# Patient Record
Sex: FEMALE | Race: WHITE | NOT HISPANIC OR LATINO | Employment: OTHER | URBAN - METROPOLITAN AREA
[De-identification: names, ages, dates, MRNs, and addresses within clinical notes are randomized per-mention and may not be internally consistent; named-entity substitution may affect disease eponyms.]

---

## 2017-01-10 PROBLEM — M21.611 BUNION OF GREAT TOE OF RIGHT FOOT: Status: ACTIVE | Noted: 2017-01-10

## 2017-02-02 ENCOUNTER — APPOINTMENT (OUTPATIENT)
Dept: RADIOLOGY | Facility: MEDICAL CENTER | Age: 72
DRG: 176 | End: 2017-02-02
Attending: EMERGENCY MEDICINE
Payer: MEDICARE

## 2017-02-02 ENCOUNTER — HOSPITAL ENCOUNTER (INPATIENT)
Facility: MEDICAL CENTER | Age: 72
LOS: 2 days | DRG: 167 | End: 2017-02-04
Attending: INTERNAL MEDICINE | Admitting: INTERNAL MEDICINE
Payer: MEDICARE

## 2017-02-02 ENCOUNTER — RESOLUTE PROFESSIONAL BILLING HOSPITAL PROF FEE (OUTPATIENT)
Dept: HOSPITALIST | Facility: MEDICAL CENTER | Age: 72
End: 2017-02-02
Payer: MEDICARE

## 2017-02-02 ENCOUNTER — APPOINTMENT (OUTPATIENT)
Dept: RADIOLOGY | Facility: MEDICAL CENTER | Age: 72
DRG: 167 | End: 2017-02-02
Attending: INTERNAL MEDICINE
Payer: MEDICARE

## 2017-02-02 ENCOUNTER — HOSPITAL ENCOUNTER (INPATIENT)
Facility: MEDICAL CENTER | Age: 72
LOS: 1 days | DRG: 176 | End: 2017-02-02
Attending: EMERGENCY MEDICINE | Admitting: INTERNAL MEDICINE
Payer: MEDICARE

## 2017-02-02 VITALS
WEIGHT: 182 LBS | HEART RATE: 77 BPM | BODY MASS INDEX: 28.56 KG/M2 | TEMPERATURE: 98.3 F | RESPIRATION RATE: 16 BRPM | HEIGHT: 67 IN | OXYGEN SATURATION: 95 % | SYSTOLIC BLOOD PRESSURE: 135 MMHG | DIASTOLIC BLOOD PRESSURE: 63 MMHG

## 2017-02-02 DIAGNOSIS — I26.99 OTHER ACUTE PULMONARY EMBOLISM WITHOUT ACUTE COR PULMONALE (HCC): ICD-10-CM

## 2017-02-02 LAB
ALBUMIN SERPL BCP-MCNC: 4 G/DL (ref 3.2–4.9)
ALBUMIN/GLOB SERPL: 1.4 G/DL
ALP SERPL-CCNC: 131 U/L (ref 30–99)
ALT SERPL-CCNC: 17 U/L (ref 2–50)
ANION GAP SERPL CALC-SCNC: 8 MMOL/L (ref 0–11.9)
APPEARANCE UR: CLEAR
AST SERPL-CCNC: 14 U/L (ref 12–45)
BACTERIA #/AREA URNS HPF: ABNORMAL /HPF
BASOPHILS # BLD AUTO: 0.5 % (ref 0–1.8)
BASOPHILS # BLD: 0.08 K/UL (ref 0–0.12)
BILIRUB SERPL-MCNC: 0.5 MG/DL (ref 0.1–1.5)
BILIRUB UR QL STRIP.AUTO: NEGATIVE
BNP SERPL-MCNC: 18 PG/ML (ref 0–100)
BUN SERPL-MCNC: 22 MG/DL (ref 8–22)
CALCIUM SERPL-MCNC: 10.1 MG/DL (ref 8.4–10.2)
CHLORIDE SERPL-SCNC: 103 MMOL/L (ref 96–112)
CO2 SERPL-SCNC: 26 MMOL/L (ref 20–33)
COLOR UR: YELLOW
CREAT SERPL-MCNC: 1.01 MG/DL (ref 0.5–1.4)
CULTURE IF INDICATED INDCX: NO UA CULTURE
EKG IMPRESSION: NORMAL
EOSINOPHIL # BLD AUTO: 0.05 K/UL (ref 0–0.51)
EOSINOPHIL NFR BLD: 0.3 % (ref 0–6.9)
EPI CELLS #/AREA URNS HPF: ABNORMAL /HPF
ERYTHROCYTE [DISTWIDTH] IN BLOOD BY AUTOMATED COUNT: 41.8 FL (ref 35.9–50)
GFR SERPL CREATININE-BSD FRML MDRD: 54 ML/MIN/1.73 M 2
GLOBULIN SER CALC-MCNC: 2.9 G/DL (ref 1.9–3.5)
GLUCOSE SERPL-MCNC: 137 MG/DL (ref 65–99)
GLUCOSE UR STRIP.AUTO-MCNC: NEGATIVE MG/DL
HCT VFR BLD AUTO: 42.8 % (ref 37–47)
HGB BLD-MCNC: 14.3 G/DL (ref 12–16)
IMM GRANULOCYTES # BLD AUTO: 0.1 K/UL (ref 0–0.11)
IMM GRANULOCYTES NFR BLD AUTO: 0.6 % (ref 0–0.9)
INR BLD: 1.3
KETONES UR STRIP.AUTO-MCNC: NEGATIVE MG/DL
LEUKOCYTE ESTERASE UR QL STRIP.AUTO: NEGATIVE
LIPASE SERPL-CCNC: 27 U/L (ref 7–58)
LV EJECT FRACT  99904: 70
LV EJECT FRACT MOD 2C 99903: 75.57
LV EJECT FRACT MOD 4C 99902: 68.34
LV EJECT FRACT MOD BP 99901: 69.78
LYMPHOCYTES # BLD AUTO: 1.13 K/UL (ref 1–4.8)
LYMPHOCYTES NFR BLD: 6.4 % (ref 22–41)
MCH RBC QN AUTO: 30.4 PG (ref 27–33)
MCHC RBC AUTO-ENTMCNC: 33.4 G/DL (ref 33.6–35)
MCV RBC AUTO: 91.1 FL (ref 81.4–97.8)
MICRO URNS: ABNORMAL
MONOCYTES # BLD AUTO: 1.08 K/UL (ref 0–0.85)
MONOCYTES NFR BLD AUTO: 6.2 % (ref 0–13.4)
MUCOUS THREADS #/AREA URNS HPF: ABNORMAL /HPF
NEUTROPHILS # BLD AUTO: 15.08 K/UL (ref 2–7.15)
NEUTROPHILS NFR BLD: 86 % (ref 44–72)
NITRITE UR QL STRIP.AUTO: NEGATIVE
NRBC # BLD AUTO: 0 K/UL
NRBC BLD AUTO-RTO: 0 /100 WBC
PH UR STRIP.AUTO: 5.5 [PH]
PLATELET # BLD AUTO: 228 K/UL (ref 164–446)
PMV BLD AUTO: 10.4 FL (ref 9–12.9)
POTASSIUM SERPL-SCNC: 4.4 MMOL/L (ref 3.6–5.5)
PROT SERPL-MCNC: 6.9 G/DL (ref 6–8.2)
PROT UR QL STRIP: NEGATIVE MG/DL
RBC # BLD AUTO: 4.7 M/UL (ref 4.2–5.4)
RBC # URNS HPF: ABNORMAL /HPF
RBC UR QL AUTO: ABNORMAL
SODIUM SERPL-SCNC: 137 MMOL/L (ref 135–145)
SP GR UR STRIP.AUTO: 1.02
TROPONIN I SERPL-MCNC: <0.02 NG/ML (ref 0–0.04)
WBC # BLD AUTO: 17.5 K/UL (ref 4.8–10.8)
WBC #/AREA URNS HPF: ABNORMAL /HPF

## 2017-02-02 PROCEDURE — 770022 HCHG ROOM/CARE - ICU (200)

## 2017-02-02 PROCEDURE — 700105 HCHG RX REV CODE 258: Performed by: EMERGENCY MEDICINE

## 2017-02-02 PROCEDURE — 304562 HCHG STAT O2 MASK/CANNULA

## 2017-02-02 PROCEDURE — 700111 HCHG RX REV CODE 636 W/ 250 OVERRIDE (IP): Performed by: EMERGENCY MEDICINE

## 2017-02-02 PROCEDURE — 94760 N-INVAS EAR/PLS OXIMETRY 1: CPT

## 2017-02-02 PROCEDURE — 700111 HCHG RX REV CODE 636 W/ 250 OVERRIDE (IP)

## 2017-02-02 PROCEDURE — 93970 EXTREMITY STUDY: CPT

## 2017-02-02 PROCEDURE — 93306 TTE W/DOPPLER COMPLETE: CPT | Mod: 26 | Performed by: INTERNAL MEDICINE

## 2017-02-02 PROCEDURE — 96372 THER/PROPH/DIAG INJ SC/IM: CPT

## 2017-02-02 PROCEDURE — 85025 COMPLETE CBC W/AUTO DIFF WBC: CPT

## 2017-02-02 PROCEDURE — 99285 EMERGENCY DEPT VISIT HI MDM: CPT

## 2017-02-02 PROCEDURE — 770020 HCHG ROOM/CARE - TELE (206)

## 2017-02-02 PROCEDURE — 84484 ASSAY OF TROPONIN QUANT: CPT

## 2017-02-02 PROCEDURE — 83880 ASSAY OF NATRIURETIC PEPTIDE: CPT

## 2017-02-02 PROCEDURE — 700102 HCHG RX REV CODE 250 W/ 637 OVERRIDE(OP): Performed by: INTERNAL MEDICINE

## 2017-02-02 PROCEDURE — 96374 THER/PROPH/DIAG INJ IV PUSH: CPT

## 2017-02-02 PROCEDURE — 700117 HCHG RX CONTRAST REV CODE 255: Performed by: RADIOLOGY

## 2017-02-02 PROCEDURE — 85610 PROTHROMBIN TIME: CPT

## 2017-02-02 PROCEDURE — 99223 1ST HOSP IP/OBS HIGH 75: CPT | Performed by: INTERNAL MEDICINE

## 2017-02-02 PROCEDURE — A9270 NON-COVERED ITEM OR SERVICE: HCPCS | Performed by: INTERNAL MEDICINE

## 2017-02-02 PROCEDURE — 06H03DZ INSERTION OF INTRALUMINAL DEVICE INTO INFERIOR VENA CAVA, PERCUTANEOUS APPROACH: ICD-10-PCS | Performed by: RADIOLOGY

## 2017-02-02 PROCEDURE — 96375 TX/PRO/DX INJ NEW DRUG ADDON: CPT

## 2017-02-02 PROCEDURE — 74177 CT ABD & PELVIS W/CONTRAST: CPT

## 2017-02-02 PROCEDURE — 81001 URINALYSIS AUTO W/SCOPE: CPT

## 2017-02-02 PROCEDURE — 700117 HCHG RX CONTRAST REV CODE 255: Performed by: EMERGENCY MEDICINE

## 2017-02-02 PROCEDURE — 83690 ASSAY OF LIPASE: CPT

## 2017-02-02 PROCEDURE — 700111 HCHG RX REV CODE 636 W/ 250 OVERRIDE (IP): Performed by: INTERNAL MEDICINE

## 2017-02-02 PROCEDURE — 71275 CT ANGIOGRAPHY CHEST: CPT

## 2017-02-02 PROCEDURE — 93005 ELECTROCARDIOGRAM TRACING: CPT | Performed by: EMERGENCY MEDICINE

## 2017-02-02 PROCEDURE — 37191 INS ENDOVAS VENA CAVA FILTR: CPT

## 2017-02-02 PROCEDURE — 99152 MOD SED SAME PHYS/QHP 5/>YRS: CPT

## 2017-02-02 PROCEDURE — 93306 TTE W/DOPPLER COMPLETE: CPT

## 2017-02-02 PROCEDURE — 93005 ELECTROCARDIOGRAM TRACING: CPT

## 2017-02-02 PROCEDURE — 80053 COMPREHEN METABOLIC PANEL: CPT

## 2017-02-02 RX ORDER — DOCUSATE SODIUM 100 MG/1
100 CAPSULE, LIQUID FILLED ORAL EVERY MORNING
Status: DISCONTINUED | OUTPATIENT
Start: 2017-02-03 | End: 2017-02-04 | Stop reason: HOSPADM

## 2017-02-02 RX ORDER — ACETAMINOPHEN 500 MG
1000 TABLET ORAL EVERY 6 HOURS PRN
COMMUNITY
End: 2017-04-26

## 2017-02-02 RX ORDER — LACTULOSE 20 G/30ML
30 SOLUTION ORAL
Status: DISCONTINUED | OUTPATIENT
Start: 2017-02-02 | End: 2017-02-02 | Stop reason: HOSPADM

## 2017-02-02 RX ORDER — ENEMA 19; 7 G/133ML; G/133ML
1 ENEMA RECTAL
Status: DISCONTINUED | OUTPATIENT
Start: 2017-02-02 | End: 2017-02-02 | Stop reason: HOSPADM

## 2017-02-02 RX ORDER — DOCUSATE SODIUM 100 MG/1
100 CAPSULE, LIQUID FILLED ORAL EVERY MORNING
Status: DISCONTINUED | OUTPATIENT
Start: 2017-02-02 | End: 2017-02-02 | Stop reason: HOSPADM

## 2017-02-02 RX ORDER — LISINOPRIL 5 MG/1
10 TABLET ORAL
Status: DISCONTINUED | OUTPATIENT
Start: 2017-02-02 | End: 2017-02-02 | Stop reason: HOSPADM

## 2017-02-02 RX ORDER — AMOXICILLIN 250 MG
1 CAPSULE ORAL
Status: CANCELLED | OUTPATIENT
Start: 2017-02-02

## 2017-02-02 RX ORDER — ACETAMINOPHEN 500 MG
1000 TABLET ORAL EVERY 6 HOURS PRN
Status: DISCONTINUED | OUTPATIENT
Start: 2017-02-02 | End: 2017-02-04 | Stop reason: HOSPADM

## 2017-02-02 RX ORDER — ONDANSETRON 2 MG/ML
4 INJECTION INTRAMUSCULAR; INTRAVENOUS ONCE
Status: COMPLETED | OUTPATIENT
Start: 2017-02-02 | End: 2017-02-02

## 2017-02-02 RX ORDER — ONDANSETRON 4 MG/1
4 TABLET, ORALLY DISINTEGRATING ORAL EVERY 4 HOURS PRN
Status: DISCONTINUED | OUTPATIENT
Start: 2017-02-02 | End: 2017-02-04 | Stop reason: HOSPADM

## 2017-02-02 RX ORDER — ENEMA 19; 7 G/133ML; G/133ML
1 ENEMA RECTAL
Status: DISCONTINUED | OUTPATIENT
Start: 2017-02-02 | End: 2017-02-04 | Stop reason: HOSPADM

## 2017-02-02 RX ORDER — KETOROLAC TROMETHAMINE 30 MG/ML
30 INJECTION, SOLUTION INTRAMUSCULAR; INTRAVENOUS EVERY 6 HOURS PRN
Status: DISCONTINUED | OUTPATIENT
Start: 2017-02-02 | End: 2017-02-04 | Stop reason: HOSPADM

## 2017-02-02 RX ORDER — ONDANSETRON 2 MG/ML
4 INJECTION INTRAMUSCULAR; INTRAVENOUS EVERY 4 HOURS PRN
Status: CANCELLED | OUTPATIENT
Start: 2017-02-02

## 2017-02-02 RX ORDER — AMOXICILLIN 250 MG
1 CAPSULE ORAL NIGHTLY
Status: DISCONTINUED | OUTPATIENT
Start: 2017-02-02 | End: 2017-02-02 | Stop reason: HOSPADM

## 2017-02-02 RX ORDER — LISINOPRIL 10 MG/1
10 TABLET ORAL
Status: DISCONTINUED | OUTPATIENT
Start: 2017-02-02 | End: 2017-02-04 | Stop reason: HOSPADM

## 2017-02-02 RX ORDER — ONDANSETRON 2 MG/ML
4 INJECTION INTRAMUSCULAR; INTRAVENOUS PRN
Status: ACTIVE | OUTPATIENT
Start: 2017-02-02 | End: 2017-02-02

## 2017-02-02 RX ORDER — AMOXICILLIN 250 MG
1 CAPSULE ORAL
Status: DISCONTINUED | OUTPATIENT
Start: 2017-02-02 | End: 2017-02-04 | Stop reason: HOSPADM

## 2017-02-02 RX ORDER — KETOROLAC TROMETHAMINE 30 MG/ML
30 INJECTION, SOLUTION INTRAMUSCULAR; INTRAVENOUS EVERY 6 HOURS PRN
Status: CANCELLED | OUTPATIENT
Start: 2017-02-02 | End: 2017-02-07

## 2017-02-02 RX ORDER — ENEMA 19; 7 G/133ML; G/133ML
1 ENEMA RECTAL
Status: CANCELLED | OUTPATIENT
Start: 2017-02-02

## 2017-02-02 RX ORDER — BISACODYL 10 MG
10 SUPPOSITORY, RECTAL RECTAL
Status: DISCONTINUED | OUTPATIENT
Start: 2017-02-02 | End: 2017-02-02 | Stop reason: HOSPADM

## 2017-02-02 RX ORDER — SODIUM CHLORIDE 9 MG/ML
500 INJECTION, SOLUTION INTRAVENOUS PRN
Status: DISCONTINUED | OUTPATIENT
Start: 2017-02-02 | End: 2017-02-04 | Stop reason: HOSPADM

## 2017-02-02 RX ORDER — LISINOPRIL 5 MG/1
10 TABLET ORAL
Status: CANCELLED | OUTPATIENT
Start: 2017-02-02

## 2017-02-02 RX ORDER — BISACODYL 10 MG
10 SUPPOSITORY, RECTAL RECTAL
Status: CANCELLED | OUTPATIENT
Start: 2017-02-02

## 2017-02-02 RX ORDER — ONDANSETRON 2 MG/ML
4 INJECTION INTRAMUSCULAR; INTRAVENOUS EVERY 4 HOURS PRN
Status: DISCONTINUED | OUTPATIENT
Start: 2017-02-02 | End: 2017-02-02 | Stop reason: HOSPADM

## 2017-02-02 RX ORDER — SODIUM CHLORIDE 9 MG/ML
1000 INJECTION, SOLUTION INTRAVENOUS ONCE
Status: COMPLETED | OUTPATIENT
Start: 2017-02-02 | End: 2017-02-02

## 2017-02-02 RX ORDER — CEPHALEXIN 500 MG/1
500 CAPSULE ORAL 4 TIMES DAILY
Status: ON HOLD | COMMUNITY
Start: 2017-01-20 | End: 2017-02-04

## 2017-02-02 RX ORDER — MIDAZOLAM HYDROCHLORIDE 1 MG/ML
INJECTION INTRAMUSCULAR; INTRAVENOUS
Status: COMPLETED
Start: 2017-02-02 | End: 2017-02-02

## 2017-02-02 RX ORDER — MIDAZOLAM HYDROCHLORIDE 1 MG/ML
.5-2 INJECTION INTRAMUSCULAR; INTRAVENOUS PRN
Status: ACTIVE | OUTPATIENT
Start: 2017-02-02 | End: 2017-02-02

## 2017-02-02 RX ORDER — AMOXICILLIN 250 MG
1 CAPSULE ORAL NIGHTLY
Status: CANCELLED | OUTPATIENT
Start: 2017-02-02

## 2017-02-02 RX ORDER — AMOXICILLIN 250 MG
1 CAPSULE ORAL
Status: DISCONTINUED | OUTPATIENT
Start: 2017-02-02 | End: 2017-02-02 | Stop reason: HOSPADM

## 2017-02-02 RX ORDER — DOCUSATE SODIUM 100 MG/1
100 CAPSULE, LIQUID FILLED ORAL EVERY MORNING
Status: CANCELLED | OUTPATIENT
Start: 2017-02-03

## 2017-02-02 RX ORDER — LACTULOSE 20 G/30ML
30 SOLUTION ORAL
Status: CANCELLED | OUTPATIENT
Start: 2017-02-02

## 2017-02-02 RX ORDER — ONDANSETRON 4 MG/1
4 TABLET, ORALLY DISINTEGRATING ORAL EVERY 4 HOURS PRN
Status: DISCONTINUED | OUTPATIENT
Start: 2017-02-02 | End: 2017-02-02 | Stop reason: HOSPADM

## 2017-02-02 RX ORDER — KETOROLAC TROMETHAMINE 30 MG/ML
30 INJECTION, SOLUTION INTRAMUSCULAR; INTRAVENOUS EVERY 6 HOURS PRN
Status: DISCONTINUED | OUTPATIENT
Start: 2017-02-02 | End: 2017-02-02 | Stop reason: HOSPADM

## 2017-02-02 RX ORDER — LISINOPRIL 5 MG/1
10 TABLET ORAL DAILY
Status: DISCONTINUED | OUTPATIENT
Start: 2017-02-02 | End: 2017-02-02

## 2017-02-02 RX ORDER — LACTULOSE 20 G/30ML
30 SOLUTION ORAL
Status: DISCONTINUED | OUTPATIENT
Start: 2017-02-02 | End: 2017-02-04 | Stop reason: HOSPADM

## 2017-02-02 RX ORDER — ONDANSETRON 2 MG/ML
4 INJECTION INTRAMUSCULAR; INTRAVENOUS EVERY 4 HOURS PRN
Status: DISCONTINUED | OUTPATIENT
Start: 2017-02-02 | End: 2017-02-04 | Stop reason: HOSPADM

## 2017-02-02 RX ORDER — BISACODYL 10 MG
10 SUPPOSITORY, RECTAL RECTAL
Status: DISCONTINUED | OUTPATIENT
Start: 2017-02-02 | End: 2017-02-04 | Stop reason: HOSPADM

## 2017-02-02 RX ORDER — ACETAMINOPHEN 500 MG
1000 TABLET ORAL EVERY 6 HOURS PRN
Status: CANCELLED | OUTPATIENT
Start: 2017-02-02

## 2017-02-02 RX ORDER — ACETAMINOPHEN 500 MG
1000 TABLET ORAL EVERY 6 HOURS PRN
Status: DISCONTINUED | OUTPATIENT
Start: 2017-02-02 | End: 2017-02-02 | Stop reason: HOSPADM

## 2017-02-02 RX ORDER — AMOXICILLIN 250 MG
1 CAPSULE ORAL NIGHTLY
Status: DISCONTINUED | OUTPATIENT
Start: 2017-02-02 | End: 2017-02-04 | Stop reason: HOSPADM

## 2017-02-02 RX ORDER — ONDANSETRON 4 MG/1
4 TABLET, ORALLY DISINTEGRATING ORAL EVERY 4 HOURS PRN
Status: CANCELLED | OUTPATIENT
Start: 2017-02-02

## 2017-02-02 RX ADMIN — KETOROLAC TROMETHAMINE 30 MG: 30 INJECTION, SOLUTION INTRAMUSCULAR; INTRAVENOUS at 20:02

## 2017-02-02 RX ADMIN — SODIUM CHLORIDE 1000 ML: 9 INJECTION, SOLUTION INTRAVENOUS at 06:28

## 2017-02-02 RX ADMIN — ENOXAPARIN SODIUM 80 MG: 100 INJECTION SUBCUTANEOUS at 08:41

## 2017-02-02 RX ADMIN — IOHEXOL 100 ML: 350 INJECTION, SOLUTION INTRAVENOUS at 07:58

## 2017-02-02 RX ADMIN — ONDANSETRON 4 MG: 2 INJECTION, SOLUTION INTRAMUSCULAR; INTRAVENOUS at 06:28

## 2017-02-02 RX ADMIN — HYDROMORPHONE HYDROCHLORIDE 1 MG: 1 INJECTION, SOLUTION INTRAMUSCULAR; INTRAVENOUS; SUBCUTANEOUS at 06:28

## 2017-02-02 RX ADMIN — IOHEXOL 38 ML: 300 INJECTION, SOLUTION INTRAVENOUS at 16:10

## 2017-02-02 RX ADMIN — MIDAZOLAM HYDROCHLORIDE 2 MG: 1 INJECTION INTRAMUSCULAR; INTRAVENOUS at 15:54

## 2017-02-02 RX ADMIN — ENOXAPARIN SODIUM 80 MG: 100 INJECTION SUBCUTANEOUS at 20:02

## 2017-02-02 RX ADMIN — KETOROLAC TROMETHAMINE 30 MG: 30 INJECTION, SOLUTION INTRAMUSCULAR; INTRAVENOUS at 10:16

## 2017-02-02 RX ADMIN — CHOLECALCIFEROL TAB 25 MCG (1000 UNIT) 1000 UNITS: 25 TAB at 20:02

## 2017-02-02 RX ADMIN — MIDAZOLAM 2 MG: 1 INJECTION INTRAMUSCULAR; INTRAVENOUS at 15:54

## 2017-02-02 RX ADMIN — LISINOPRIL 10 MG: 10 TABLET ORAL at 20:02

## 2017-02-02 ASSESSMENT — PAIN SCALES - GENERAL
PAINLEVEL_OUTOF10: 2
PAINLEVEL_OUTOF10: 4
PAINLEVEL_OUTOF10: 2
PAINLEVEL_OUTOF10: 1
PAINLEVEL_OUTOF10: 0
PAINLEVEL_OUTOF10: 2
PAINLEVEL_OUTOF10: 0
PAINLEVEL_OUTOF10: 2
PAINLEVEL_OUTOF10: 0

## 2017-02-02 ASSESSMENT — LIFESTYLE VARIABLES
TOTAL SCORE: 0
EVER HAD A DRINK FIRST THING IN THE MORNING TO STEADY YOUR NERVES TO GET RID OF A HANGOVER: NO
EVER_SMOKED: NEVER
AVERAGE NUMBER OF DAYS PER WEEK YOU HAVE A DRINK CONTAINING ALCOHOL: 7
TOTAL SCORE: 0
EVER HAD A DRINK FIRST THING IN THE MORNING TO STEADY YOUR NERVES TO GET RID OF A HANGOVER: NO
CONSUMPTION TOTAL: POSITIVE
ON A TYPICAL DAY WHEN YOU DRINK ALCOHOL HOW MANY DRINKS DO YOU HAVE: 2
TOTAL SCORE: 0
EVER FELT BAD OR GUILTY ABOUT YOUR DRINKING: NO
ALCOHOL_USE: YES
EVER_SMOKED: NEVER
TOTAL SCORE: 0
HAVE PEOPLE ANNOYED YOU BY CRITICIZING YOUR DRINKING: NO
HAVE PEOPLE ANNOYED YOU BY CRITICIZING YOUR DRINKING: NO
EVER_SMOKED: NEVER
DO YOU DRINK ALCOHOL: YES
HOW MANY TIMES IN THE PAST YEAR HAVE YOU HAD 5 OR MORE DRINKS IN A DAY: 0
HAVE YOU EVER FELT YOU SHOULD CUT DOWN ON YOUR DRINKING: NO
AVERAGE NUMBER OF DAYS PER WEEK YOU HAVE A DRINK CONTAINING ALCOHOL: 7
EVER FELT BAD OR GUILTY ABOUT YOUR DRINKING: NO
HOW MANY TIMES IN THE PAST YEAR HAVE YOU HAD 5 OR MORE DRINKS IN A DAY: 0
ON A TYPICAL DAY WHEN YOU DRINK ALCOHOL HOW MANY DRINKS DO YOU HAVE: 2
TOTAL SCORE: 0
TOTAL SCORE: 0
HAVE YOU EVER FELT YOU SHOULD CUT DOWN ON YOUR DRINKING: NO
EVER_SMOKED: NEVER
CONSUMPTION TOTAL: POSITIVE

## 2017-02-02 ASSESSMENT — COPD QUESTIONNAIRES
HAVE YOU SMOKED AT LEAST 100 CIGARETTES IN YOUR ENTIRE LIFE: NO/DON'T KNOW
DURING THE PAST 4 WEEKS HOW MUCH DID YOU FEEL SHORT OF BREATH: NONE/LITTLE OF THE TIME
COPD SCREENING SCORE: 2
DO YOU EVER COUGH UP ANY MUCUS OR PHLEGM?: NO/ONLY WITH OCCASIONAL COLDS OR INFECTIONS

## 2017-02-02 ASSESSMENT — PATIENT HEALTH QUESTIONNAIRE - PHQ9
2. FEELING DOWN, DEPRESSED, IRRITABLE, OR HOPELESS: NOT AT ALL
1. LITTLE INTEREST OR PLEASURE IN DOING THINGS: NOT AT ALL
SUM OF ALL RESPONSES TO PHQ QUESTIONS 1-9: 0
SUM OF ALL RESPONSES TO PHQ9 QUESTIONS 1 AND 2: 0

## 2017-02-02 NOTE — PROGRESS NOTES
Pt underwent an IVC filter placement performed by Dr Chamberlain. Pt remained hemodynamically stable throughout the procedure. No adverse reaction noted. Bandage to right groin access site cdi. Report to ZAHIDA Gomez. Pt taken back to room in stable condition.     Cook Celect Platinum Vena Cava Filter - ref # [id number], lot # [id number]

## 2017-02-02 NOTE — ED PROVIDER NOTES
"ED Provider Note    CHIEF COMPLAINT  Chief Complaint   Patient presents with   • Shortness of Breath     Started at 2200 last night, \"couldnt open rib cage because of the pain\"   • Muscle Spasms     Right side mid to low back, 9/10 pain at home, bunion surgery Oscar 10       HPI  Katy Perez is a 71 y.o. female who presents to the ED with back spasms. The patient states that yesterday afternoon she started noticing some pain around her right CVA area right lower back area, and progressively got worse, 10:00 last night she is having spasms where she can't take a breath. Pain is worse when she takes a big deep breath, does not change with movement, worse with lying down. The patient had some nausea but no vomiting, does radiate around some to the right upper side. She still has her gallbladder and her appendix. No aung abdominal pain, no hematuria, dysuria. No chest pains, cough, fevers. No history of DVT, PE, is not on hormones, did have surgery or proximal 0.3 weeks ago on her foot. No leg pain or swelling.    REVIEW OF SYSTEMS  See HPI for further details. All other systems are negative.     PAST MEDICAL HISTORY  Past Medical History   Diagnosis Date   • Hypertension    • MEDICAL HOME 11/8/12   • Arthritis 5/16/2012     bilateral knees   • Pain      chronic foot pain       FAMILY HISTORY  Family History   Problem Relation Age of Onset   • Hypertension Father    • Heart Disease Father 89   • Cancer Maternal Grandmother    • Heart Disease Mother 84     CHF       SOCIAL HISTORY  Social History     Social History   • Marital Status:      Spouse Name: N/A   • Number of Children: N/A   • Years of Education: N/A     Social History Main Topics   • Smoking status: Never Smoker    • Smokeless tobacco: Never Used   • Alcohol Use: 4.2 oz/week     7 Glasses of wine per week      Comment: \"a couple glasses of wine a day\"   • Drug Use: No   • Sexual Activity: No     Other Topics Concern   • None     Social " "History Narrative       SURGICAL HISTORY  Past Surgical History   Procedure Laterality Date   • Lumpectomy     • Pr enlarge breast with implant     • Gyn surgery  1977     tubal   • Knee arthroplasty total  11/5/2013     Performed by Luis Faulkner M.D. at SURGERY [facility]   • Bunionectomy drew Right 1/10/2017     Procedure: BUNIONECTOMY DREW 2ND;  Surgeon: Ten Burton D.P.M.;  Location: SURGERY SURGICAL [facility] ORS;  Service:    • Hammertoe correction Right 1/10/2017     Procedure: HAMMERTOE CORRECTION;  Surgeon: Ten Burton D.P.M.;  Location: SURGERY SURGICAL [facility] ORS;  Service:        CURRENT MEDICATIONS  Home Medications     Reviewed by Henny Morillo R.N. (Registered Nurse) on 02/02/17 at [facility]  Med List Status: Complete    Medication Last Dose Status    acetaminophen (TYLENOL) 500 MG Tab 2/1/2017 Active    cephALEXin (KEFLEX) 500 MG Cap completed Active    Cholecalciferol (VITAMIN D3) 5000 UNITS CAPS 2/1/2017 Active    lisinopril (PRINIVIL) 10 MG Tab 2/1/2017 Active                ALLERGIES  Allergies   Allergen Reactions   • Pcn [Penicillins] Rash       PHYSICAL EXAM  VITAL SIGNS: /63 mmHg  Pulse 77  Temp(Src) 36.8 °C (98.3 °F)  Resp 16  Ht 1.702 m (5' 7\")  Wt 82.555 kg (182 lb)  BMI 28.50 kg/m2  SpO2 95%  Breastfeeding? No  Constitutional: Well developed, Well nourished, moderate distress, Non-toxic appearance.   HENT:  Atraumatic, Normocephalic, Oral pharynx with moist mucous membranes.   Eyes:  EOMI, PERRL, no scleral icterus.  Neck: Normal range of motion, No tenderness, Supple, No stridor.   Cardiovascular: Normal heart rate, Normal rhythm,   Thorax & Lungs: Normal breath sounds, No respiratory distress, No wheezing, No chest tenderness.   Abdomen: Bowel sounds normal, Soft, No tenderness, No masses, No pulsatile masses. No right upper quadrant tenderness to palpation   Skin: Warm, Dry, No erythema, No rash.   Back: No rash, nontender, questionable slight right " CVA tenderness, no midline T or L-spine tenderness  Extremities: Intact distal pulses, No edema, No tenderness.   Neurologic: Alert & oriented x 3, Normal motor function, Normal sensory function, No focal deficits noted. Gait wide based but no ataxia    RADIOLOGY/PROCEDURES  ECHOCARDIOGRAM COMP W/O CONT   Final Result      LE VENOUS DUPLEX - DVT ([facility] Only)   Final Result      CT-ABDOMEN-PELVIS WITH   Final Result      No CT evidence of acute inflammatory abdominopelvic abnormality      Mild colonic diverticulosis without evidence of diverticulitis      Multiple indeterminate hepatic masses most likely are hemangiomas although less benign processes are not excluded. Multiphase enhanced MRI or CT may help better characterize      Left renal parapelvic cysts      CT-CTA CHEST PULMONARY ARTERY W/ RECONS   Final Result      Bilateral lobar and segmental pulmonary thromboemboli with right lower lobe segmental infarction      No ancillary findings to support right heart strain      Indeterminate right hepatic mass differential includes hemangioma, less benign solid mass or dense cyst. Multiphase enhanced CT or MRI could help clarify      Small right effusion      Findings were discussed with SHOLA VILLAGOMEZ on 2/2/2017 8:10 AM.          EKG Interpretation #1    Interpreted by emergency department physician    Rhythm: normal sinus   Rate: normal  Axis: normal  Ectopy: none  Conduction: normal  ST Segments: normal  T Waves: normal  Q Waves: none    Clinical Impression: no acute changes        EKG Interpretation #2    Interpreted by emergency department physician    Rhythm: normal sinus   Rate: normal  Axis: normal  Ectopy: none  Conduction: normal  ST Segments: normal  T Waves: flattening in  inferior leads  Q Waves: none    Clinical Impression: non-specific EKG        COURSE & MEDICAL DECISION MAKING  Pertinent Labs & Imaging studies reviewed. (See chart for details)  Patient is coming in with  right-sided flank pain differential diagnosis is large colon PE, constipation, gallbladder, kidney stones, muscle spasm. Due to this I will get a CT scan of the chest with IV contrast to rule out PE and followed through the abdomen to determine if there is any intra-abdominal pathology. We'll give the patient pain medicines, IV fluids    Patient got hypoxic after Dilaudid, CT scans confirmed bilateral pulmonary emboli, given the patient a Lovenox shot, CT scan of abdomen and pelvis was negative, discussed the case with Dr. Reid for admission to hospital.    FINAL IMPRESSION  1. Other acute pulmonary embolism without acute cor pulmonale (CMS-HCC)    . 2. Critical care time 35 minutes        This dictation was created using voice recognition software. The accuracy of the dictation is limited to the abilities of the software. I expect there may be some errors of grammar and possibly content. The nursing notes were reviewed and certain aspects of this information were incorporated into this note.    Electronically signed by: Juan F Donis, 2/2/2017 6:32 AM

## 2017-02-02 NOTE — IP AVS SNAPSHOT
" <p align=\"LEFT\"><IMG SRC=\"//[email or web address]/blob$/[email or web address]\" alt=\"Image\" WIDTH=\"50%\" HEIGHT=\"200\" BORDER=\"\"></p>                   Name:Katy Perez  Medical Record Number:[id number]  CSN: [id number]    YOB: 1945   Age: 71 y.o.  Sex: female  HT:1.702 m (5' 7.01\") WT: 83.2 kg (183 lb 6.8 oz)          Admit Date: 2/2/2017     Discharge Date:   Today's Date: 2/4/2017  Attending Doctor:  Harmony Reid M.D.                  Allergies:  Pcn          Follow-up Information     1. Call [facility].    Why:  [phone] for scheduling IVC filter removal in next few months.        2. Follow up with Nicholas Darnell M.D.. Schedule an appointment as soon as possible for a visit in 1 month.    Specialty:  Internal Medicine    Contact information    [address] [id number] [phone]           Medication List      Take these Medications        Instructions    acetaminophen 500 MG Tabs   Commonly known as:  TYLENOL    Take 1,000 mg by mouth every 6 hours as needed for Moderate Pain.   Dose:  1000 mg       lisinopril 10 MG Tabs   Commonly known as:  PRINIVIL    Take 1 Tab by mouth every day. TAKE ONE TABLET BY MOUTH DAILY   Dose:  10 mg       * rivaroxaban 15 MG Tabs tablet   Commonly known as:  XARELTO    Take 1 Tab by mouth 2 times a day.   Dose:  15 mg       * rivaroxaban 20 MG Tabs tablet   Commonly known as:  XARELTO    Doctor's comments:  To be taken after the 21st day of using Xarelto 15mg BID.   Take 1 Tab by mouth with dinner.   Dose:  20 mg       vitamin D3 5000 UNITS Caps    Take 1 Cap by mouth every day.   Dose:  1 Cap       * Notice:  This list has 2 medication(s) that are the same as other medications prescribed for you. Read the directions carefully, and ask your doctor or other care provider to review them with you.      "

## 2017-02-02 NOTE — ED NOTES
Assumed care of pt using AIDET.  Pt found to be 77% on RA, repositioned on gurney, placed on oxygen.  Pt states it is difficult to take a deep breath.    Pt states it feels better with the oxygen on.

## 2017-02-02 NOTE — DISCHARGE PLANNING
Medical Social Work    Referral: Pt discussed at IDT rounds this AM.    Intervention: New admit, pt will need an Echo.  No SS needs identified nor any requests for MARLY Goodwin during rounds.      Plan: SW available for any assistance with d.c planning.

## 2017-02-02 NOTE — DISCHARGE PLANNING
MARLY Goodwin received a call from transfer center with bed assignment - [facility] [address] for report [id number].  MARLY gave CO[facility]RA to [facility] who will be bringing it to bs RN Clara.  MARLY called [facility] requesting a [facility] transport as soon as possible

## 2017-02-02 NOTE — IP AVS SNAPSHOT
[employer] Access Code: Activation code not generated  Current [employer] Status: Active    [email or web address]  A secure, online tool to manage your health information     [facility]’s [employer]® is a secure, online tool that connects you to your personalized health information from the privacy of your home -- day or night - making it very easy for you to manage your healthcare. Once the activation process is completed, you can even access your medical information using the [employer] jose cruz, which is available for free in the [employer] store or Google Play store.     [employer] provides the following levels of access (as shown below):   My Chart Features   [facility] Primary Care Doctor [facility]  Specialists [facility]  Urgent  Care Non-[facility]  Primary Care  Doctor   Email your healthcare team securely and privately 24/7 X X X X   Manage appointments: schedule your next appointment; view details of past/upcoming appointments X      Request prescription refills. X      View recent personal medical records, including lab and immunizations X X X X   View health record, including health history, allergies, medications X X X X   Read reports about your outpatient visits, procedures, consult and ER notes X X X X   See your discharge summary, which is a recap of your hospital and/or ER visit that includes your diagnosis, lab results, and care plan. X X       How to register for [employer]:  1. Go to  [email or web address].  2. Click on the Sign Up Now box, which takes you to the New Member Sign Up page. You will need to provide the following information:  a. Enter your [employer] Access Code exactly as it appears at the top of this page. (You will not need to use this code after you’ve completed the sign-up process. If you do not sign up before the expiration date, you must request a new code.)   b. Enter your date of birth.   c. Enter your home email address.   d. Click Submit, and follow the next screen’s instructions.  3. Create a [employer] ID. This will  be your [facility] login ID and cannot be changed, so think of one that is secure and easy to remember.  4. Create a [facility] password. You can change your password at any time.  5. Enter your Password Reset Question and Answer. This can be used at a later time if you forget your password.   6. Enter your e-mail address. This allows you to receive e-mail notifications when new information is available in [facility].  7. Click Sign Up. You can now view your health information.    For assistance activating your [facility] account, call [phone]

## 2017-02-02 NOTE — PROGRESS NOTES
Direct admit from [facility]. Dr. Reid accepting patient for Pulmonary embolism. ADT signed and held by Dr. Reid, need to be released upon patient arrival to unit. Patient arriving via ground transport.

## 2017-02-02 NOTE — IP AVS SNAPSHOT
" After Visit Summary                                                                                                                  Name:Katy Perez  Medical Record Number:[id number]  CSN: [id number]    YOB: 1945   Age: 71 y.o.  Sex: female  HT:1.702 m (5' 7.01\") WT: 83.2 kg (183 lb 6.8 oz)          Admit Date: 2/2/2017     Discharge Date:   Today's Date: 2/4/2017  Attending Doctor:  Harmony Reid M.D.                  Allergies:  Pcn            Discharge Instructions       Foot Fracture  Your caregiver has diagnosed you as having a foot fracture (broken bone). Your foot has many bones. You have a fracture, or break, in one of these bones. In some cases, your doctor may put on a splint or removable fracture boot until the swelling in your foot has lessened. A cast may or may not be required.  HOME CARE INSTRUCTIONS   If you do not have a cast or splint:  You may bear weight on your injured foot as tolerated or advised.   Do not put any weight on your injured foot for as long as directed by your caregiver. Slowly increase the amount of time you walk on the foot as the pain and swelling allows or as advised.   Use crutches until you can bear weight without pain. A gradual increase in weight bearing may help.   Apply ice to the injury for 15-20 minutes each hour while awake for the first 2 days. Put the ice in a plastic bag and place a towel between the bag of ice and your skin.   If an ace bandage (stretchy, elastic wrapping bandage) was applied, you may re-wrap it if ankle is more painful or your toes become cold and swollen.   If you have a cast or splint:  Use your crutches for as long as directed by your caregiver.   To lessen the swelling, keep the injured foot elevated on pillows while lying down or sitting. Elevate your foot above your heart.   Apply ice to the injury for 15-20 minutes each hour while awake for the first 2 days. Put the ice in a plastic bag and place a thin towel between " the bag of ice and your cast.   Plaster or fiberglass cast:   Do not try to scratch the skin under the cast using a sharp or pointed object down the cast.   Check the skin around the cast every day. You may put lotion on any red or sore areas.   Keep your cast clean and dry.   Plaster splint:   Wear the splint until you are seen for a follow-up examination.   You may loosen the elastic around the splint if your toes become numb, tingle, or turn blue or cold. Do not rest it on anything harder than a pillow in the first 24 hours.   Do not put pressure on any part of your splint. Use your crutches as directed.   Keep your splint dry. It can be protected during bathing with a plastic bag. Do not lower the splint into water.   If you have a fracture boot you may remove it to shower. Bear weight only as instructed by your caregiver.   Only take over-the-counter or prescription medicines for pain, discomfort, or fever as directed by your caregiver.   SEEK IMMEDIATE MEDICAL CARE IF:   Your cast gets damaged or breaks.   You have continued severe pain or more swelling than you did before the cast was put on.   Your skin or nails of your casted foot turn blue, gray, feel cold or numb.   There is a bad smell from your cast.   There is severe pain with movement of your toes.   There are new stains and/or drainage coming from under the cast.   MAKE SURE YOU:   Understand these instructions.   Will watch your condition.   Will get help right away if you are not doing well or get worse.   Document Released: 12/15/2001 Document Revised: 03/11/2013 Document Reviewed: 01/21/2010  [employer]® Patient Information ©2013 [employer].If this Referral to the anticoagulation clinic is being ordered with a Referral to home health, then schedule the anticoagulation visit after the home health treatments are completed.  Inferior Vena Cava Filter Insertion  Insertion of an inferior vena cava (IVC) filter is a procedure in which a filter is  placed into the large vein in your abdomen that carries blood from the lower part of your body to your heart (inferior vena cava). Placement of the filter here helps prevent blood clots in the legs or pelvis from traveling to your lungs. A large blood clot in the lungs can cause death.   The filter is a small, metal device about an inch long. It is shaped like the spokes of an umbrella and is inserted through a pathway created in your neck or groin. The risks of this procedure are usually small and easily managed. Inferior vena cava filters are only used when blood thinners (anticoagulants) cannot be used to prevent blood clots from forming. This may occur because of:  1. You have severe platelet problems or shortage.  2. You have had recent or current major bleeding that cannot be treated.  3. You have bleeding associated with anticoagulants.  4. You have recurrence of blood clots while on anticoagulants.  5. You have a need for surgery in the near future.  6. You have bleeding in your head.  EXPECTATIONS OF A FILTER  1. An IVC filter will reduce the risk of a large blood clot making its way to your lungs (pulmonary embolus, or PE). It cannot eliminate the risk completely, or prevent small PEs from occurring.  2. It does not stop blood clots from growing, recurring, or developing into postphlebitic syndrome. This is a condition that can occur after there is inflammation in a vein (phlebitis).  LET YOUR HEALTH CARE PROVIDER KNOW ABOUT:  1. Any allergies you have. This includes an allergy to iodine or contrast dye.  2. All medicines you are taking, including vitamins, herbs, eye drops, creams and over-the-counter medicines.  3. Previous problems you or members of your family have had with the use of anesthetics.  4. Any blood disorders you have.  5. Previous surgeries you had had.  6. Medical conditions you have.  7. Possibility of pregnancy, if this applies.  RISKS AND COMPLICATIONS  Generally, this is a safe  procedure. However, as with any procedure, problems can occur. Possible problems include:  1. A small bruise around the needle insertion site. A larger pooling of blood called a hematoma may form. This is usually of no concern.  2. The filter can block the vena cava. This can cause some swelling of the legs.  3. The filter may eventually fail and not work properly.  4. Continued bleeding or infections (uncommon).  5. Damage to the vein by the catheter (rare).  BEFORE THE PROCEDURE  1. Your health care provider may want you to have blood tests. These tests can help tell how well your kidneys and liver are working. They can also show how well your blood clots.  2. If you take blood thinners, ask your health care provider if and when you should stop taking them.  3. Do not eat or drink for 4 hours before the procedure or as directed by your health care provider.  4. Make arrangements for someone to drive you home. Depending on the procedure, you may be  able to go home the same day.   PROCEDURE   1. Insertion of a Vena Cava filter is performed by a vascular surgeon or cardiologist in a cardiac catheterization lab.  2. The procedure usually takes about 30 minutes to 1 hour. This can vary.  3. An IV needle will be inserted into one of your veins. Medicine will be able to flow directly into your body through this needle.  4. Medicines may given to help you relax and relieve anxiety (sedative).  5. The procedure is done through a large vein either in your neck or groin. The skin around this area is cleaned and shaved, as necessary.  6. The skin and deeper tissues over the vein will be made numb with a local anesthetic. You are awake during the procedure and can let your health care providers know if you have discomfort.  7. A needle is then put into the vein. A guide wire is placed through the needle and into the vein. This is used to help insert a catheter and the IVC filter into your vein.  8. Contrast dye may be  injected into the inferior vena cava to help guide the catheter and verify precise placement of the IVC filter. X-ray equipment may also be used to verify that the catheter and the wire are in the correct position.  9. The wire is then withdrawn.  10. The IVC filter is then passed over the catheter into the vein, and inserted into the correct location in the vena cava.  11. The catheter is then removed. Pressure will be kept on the needle insertion point for several minutes or until it is unlikely to bleed.  AFTER THE PROCEDURE  · You will stay in a recovery area until any sedation medicine you were given has worn off. Your blood pressure and  pulse will be checked.  · If there are no problems, you should be able to go home after the procedure.  · You may feel sore at the area of the needle insertion for a few days.     This information is not intended to replace advice given to you by your health care provider. Make sure you discuss any questions you have with your health care provider.     Document Released: 02/07/2007 Document Revised: 01/08/2016 Document Reviewed: 08/25/2014  [employer] Patient Education ©2016 [employer]  Discharge Instructions    Discharged to home by car with relative. Discharged via wheelchair, hospital escort: Yes.  Special equipment needed: Not Applicable    Be sure to schedule a follow-up appointment with your primary care doctor or any specialists as instructed.     Discharge Plan:   Diet Plan: Discussed  Activity Level: Discussed  Confirmed Follow up Appointment: Patient to Call and Schedule Appointment  Confirmed Symptoms Management: Discussed  Medication Reconciliation Updated: Yes  Pneumococcal Vaccine Given - only chart on this line when given: Given (See MAR)  Influenza Vaccine Indication: Not indicated: Previously immunized this influenza season and > 8 years of age    I understand that a diet low in cholesterol, fat, and sodium is recommended for good health. Unless I  have been given specific instructions below for another diet, I accept this instruction as my diet prescription.   Other diet: Regular    Special Instructions:   Deep Vein Thrombosis Discharge Instructions    A deep vein thrombosis (DVT) is a blood clot (thrombus) that develops in a deep vein. A DVT is a clot in the deep, larger veins of the leg, arm, or pelvis. These are more dangerous than clots that might form in veins on the surface of the body. Deep vein thrombosis can lead to complications if the clot breaks off and travels in the bloodstream to the lungs.     CAUSES  Blood clots form in a vein for different reasons. Usually several things cause blood clots. They include:   · The flow of blood slows down.   · The inside of the vein is damaged in some way.   · The person has a condition that makes blood clot more easily. These conditions may include:  · Older age (especially over 75 years old).  · Having a history of blood clots.  · Having major or lengthy surgery. Hip surgery is particularly high-risk.   · Breaking a hip or leg.  · Sitting or lying still for a long time.  · Cancer or cancer treatment.  · Having a long, thin tube (catheter) placed inside a vein during a medical procedure.   · Being overweight (obese).  · Pregnancy and childbirth.  · Medicines with estrogen.  · Smoking.  · Other circulation or heart problems.     SYMPTOMS  When a clot forms, it can either partially or totally block the blood flow in that vein. Symptoms of a DVT can include:  · Swelling of the leg or arm, especially if one side is much worse.  · Warmth and redness of the leg or arm, especially if one side is much worse.   · Pain in an arm or leg. If the clot is in the leg, symptoms may be more noticeable or worse when standing or walking.  If the blood clot travels to the lung, it may cause:  · Shortness of breath.  · Chest pain. The pain may be worsened by deep breaths.   · Coughing up thick mucus (phlegm), possibly flecked with  blood.   Anyone with these symptoms should get emergency medical treatment right away. Call your local emergency  Services (911 in [place] if you have these symptoms.     DIAGNOSIS  If a DVT is suspected, your caregiver will take a full medical history. He or she will also perform a physical exam. Tests that also may be required include:   · Studies of the clotting properties of the blood.   · An ultrasound scan.   · X-rays to show the flow of blood when special dye is injected into the veins (venography).   · Studies of your lungs if you have any chest symptoms.     PREVENTION  · Exercise the legs regularly. Take a brisk 30 minute walk every day.   · Maintain a weight that is appropriate for your height.  · Avoid sitting or lying in bed for long periods of time without moving your legs.   · Women, particularly those over the age of 35, should consider the risks and benefits of taking estrogen medicine, including birth control pills.   · Do not smoke, especially if you take estrogen medicines.   · Long-distance travel can increase your risk. You should exercise your legs by walking or pumping the muscles every hour.   · In hospital prevention: Prevention may include medical and non medical measures.     TREATMENT  · The most common treatment for DVT is blood thinning (anticoagulant) medicine, which reduces the blood's tendency to clot. Anticoagulants can stop new blood clots from forming and old ones from growing. They cannot dissolve existing clots. Your body does this by itself over time. Anticoagulants can be given by mouth, by intravenous (IV) access, or by injection. Your caregiver will determine the best program for you.   · Less commonly, clot-dissolving drugs (thrombolytics) are used to dissolve a DVT. They carry a high risk of bleeding, so they are used mainly in severe cases.   · Very rarely, a blood clot in the leg needs to be removed surgically.   · If you are unable to take anticoagulants, your caregiver  may arrange for you to have a filter placed in a main vein in your belly (abdomen). This filter prevents clots from traveling to your lungs.     HOME CARE INSTRUCTIONS  Take all medicines prescribed by your caregiver. Follow the directions carefully.   · You will most likely continue taking anticoagulants after you leave the hospital. Your caregiver will advise you on the length of treatment (usually 3 to 5 months, sometimes for life).   · Taking too much or too little of an anticoagulant is dangerous. While taking this type of medicine, you will need to have regular blood tests to be sure the dose is correct. The dose can change for many reasons. It is critically important that you take this medicine exactly as prescribed, and that you have blood tests exactly as directed.   · Many foods can interfere with anticoagulants. These include foods high in vitamin K, such as spinach, kale, broccoli, cabbage, junaid and turnip greens, [place] sprouts, peas, cauliflower, seaweed, parsley, beef and pork liver, green tea, and soybean oil. Your caregiver should discuss limits on these foods with you or you should arrange a visit with a dietician to answer your questions.   · Many medicines can interfere with anticoagulants. You must tell your caregiver about any and all medicines you take. This includes all vitamins and supplements. Be especially cautious with aspirin and anti-inflammatory medicines. Ask your caregiver before taking these.   · Anticoagulants can have side effects, mostly excessive bruising or bleeding. You will need to hold pressure over cuts for longer than usual. Avoid alcoholic drinks or consume only very small amounts while taking this medicine.    If you are taking an anticoagulant:  · Wear a medical alert bracelet.  · Notify your dentist or other caregivers before procedures.  · Avoid contact sports.    · Ask your caregiver how soon you can go back to normal activities. Not being active can lead to new  clots. Ask for a list of what you should and should not do.   · Exercise your lower leg muscles. This is important while traveling.   · You may need to wear compression stockings. These are tight elastic stockings that apply pressure to the lower legs. This can help keep the blood in the legs from clotting.   · If you are a smoker, you should quit.   · Learn as much as you can about DVT.     SEEK MEDICAL CARE IF:  · You have unusual bruising or any bleeding problems.  · The swelling or pain in your affected arm or leg is not gradually improving.   · You anticipate surgery or long-distance travel. You should get specific advice on DVT prevention.   · You discover other family members with blood clots. This may require further testing for inherited diseases or conditions.     SEEK IMMEDIATE MEDICAL CARE IF:  · You develop chest pain.  · You develop severe shortness of breath.  · You begin to cough up bloody mucus or phlegm (sputum).  · You feel dizzy or faint.   · You develop swelling or pain in the leg.  · You have breathing problems after traveling.    MAKE SURE YOU:  · Understand these instructions.  · Will watch your condition.  Will get help right away if you are not doing well or get worse.     · Is patient discharged on Warfarin / Coumadin?   No     · Is patient Post Blood Transfusion?  No    Depression / Suicide Risk    As you are discharged from this [facility] facility, it is important to learn how to keep safe from harming yourself.    Recognize the warning signs:  · Abrupt changes in personality, positive or negative- including increase in energy   · Giving away possessions  · Change in eating patterns- significant weight changes-  positive or negative  · Change in sleeping patterns- unable to sleep or sleeping all the time   · Unwillingness or inability to communicate  · Depression  · Unusual sadness, discouragement and loneliness  · Talk of wanting to die  · Neglect of personal  appearance   · Rebelliousness- reckless behavior  · Withdrawal from people/activities they love  · Confusion- inability to concentrate     If you or a loved one observes any of these behaviors or has concerns about self-harm, here's what you can do:  · Talk about it- your feelings and reasons for harming yourself  · Remove any means that you might use to hurt yourself (examples: pills, rope, extension cords, firearm)  · Get professional help from the community (Mental Health, Substance Abuse, psychological counseling)  · Do not be alone:Call your Safe Contact- someone whom you trust who will be there for you.  · Call your local CRISIS HOTLINE 058-8684 or 988-756-1524  · Call your local Children's Mobile Crisis Response Team [place] (412) 969-0572 or www.[employer]  · Call the toll free National Suicide Prevention Hotlines   · National Suicide Prevention Lifeline 496-948-KELO (2728)  · National [employer] Line Network 800-SUICIDE (076-3361)      Deep Vein Thrombosis Discharge Instructions    A deep vein thrombosis (DVT) is a blood clot (thrombus) that develops in a deep vein. A DVT is a clot in the deep, larger veins of the leg, arm, or pelvis. These are more dangerous than clots that might form in veins on the surface of the body. Deep vein thrombosis can lead to complications if the clot breaks off and travels in the bloodstream to the lungs.     CAUSES  Blood clots form in a vein for different reasons. Usually several things cause blood clots. They include:   · The flow of blood slows down.   · The inside of the vein is damaged in some way.   · The person has a condition that makes blood clot more easily. These conditions may include:  · Older age (especially over 75 years old).  · Having a history of blood clots.  · Having major or lengthy surgery. Hip surgery is particularly high-risk.   · Breaking a hip or leg.  · Sitting or lying still for a long time.  · Cancer or cancer treatment.  · Having a long,  thin tube (catheter) placed inside a vein during a medical procedure.   · Being overweight (obese).  · Pregnancy and childbirth.  · Medicines with estrogen.  · Smoking.  · Other circulation or heart problems.     SYMPTOMS  When a clot forms, it can either partially or totally block the blood flow in that vein. Symptoms of a DVT can include:  · Swelling of the leg or arm, especially if one side is much worse.  · Warmth and redness of the leg or arm, especially if one side is much worse.   · Pain in an arm or leg. If the clot is in the leg, symptoms may be more noticeable or worse when standing or walking.  If the blood clot travels to the lung, it may cause:  · Shortness of breath.  · Chest pain. The pain may be worsened by deep breaths.   · Coughing up thick mucus (phlegm), possibly flecked with blood.   Anyone with these symptoms should get emergency medical treatment right away. Call your local emergency  Services (911 in [place] if you have these symptoms.     DIAGNOSIS  If a DVT is suspected, your caregiver will take a full medical history. He or she will also perform a physical exam. Tests that also may be required include:   · Studies of the clotting properties of the blood.   · An ultrasound scan.   · X-rays to show the flow of blood when special dye is injected into the veins (venography).   · Studies of your lungs if you have any chest symptoms.     PREVENTION  · Exercise the legs regularly. Take a brisk 30 minute walk every day.   · Maintain a weight that is appropriate for your height.  · Avoid sitting or lying in bed for long periods of time without moving your legs.   · Women, particularly those over the age of 35, should consider the risks and benefits of taking estrogen medicine, including birth control pills.   · Do not smoke, especially if you take estrogen medicines.   · Long-distance travel can increase your risk. You should exercise your legs by walking or pumping the muscles every hour.   · In  hospital prevention: Prevention may include medical and non medical measures.     TREATMENT  · The most common treatment for DVT is blood thinning (anticoagulant) medicine, which reduces the blood's tendency to clot. Anticoagulants can stop new blood clots from forming and old ones from growing. They cannot dissolve existing clots. Your body does this by itself over time. Anticoagulants can be given by mouth, by intravenous (IV) access, or by injection. Your caregiver will determine the best program for you.   · Less commonly, clot-dissolving drugs (thrombolytics) are used to dissolve a DVT. They carry a high risk of bleeding, so they are used mainly in severe cases.   · Very rarely, a blood clot in the leg needs to be removed surgically.   · If you are unable to take anticoagulants, your caregiver may arrange for you to have a filter placed in a main vein in your belly (abdomen). This filter prevents clots from traveling to your lungs.     HOME CARE INSTRUCTIONS  Take all medicines prescribed by your caregiver. Follow the directions carefully.   · You will most likely continue taking anticoagulants after you leave the hospital. Your caregiver will advise you on the length of treatment (usually 3 to 5 months, sometimes for life).   · Taking too much or too little of an anticoagulant is dangerous. While taking this type of medicine, you will need to have regular blood tests to be sure the dose is correct. The dose can change for many reasons. It is critically important that you take this medicine exactly as prescribed, and that you have blood tests exactly as directed.   · Many foods can interfere with anticoagulants. These include foods high in vitamin K, such as spinach, kale, broccoli, cabbage, junaid and turnip greens, [place] sprouts, peas, cauliflower, seaweed, parsley, beef and pork liver, green tea, and soybean oil. Your caregiver should discuss limits on these foods with you or you should arrange a visit  with a dietician to answer your questions.   · Many medicines can interfere with anticoagulants. You must tell your caregiver about any and all medicines you take. This includes all vitamins and supplements. Be especially cautious with aspirin and anti-inflammatory medicines. Ask your caregiver before taking these.   · Anticoagulants can have side effects, mostly excessive bruising or bleeding. You will need to hold pressure over cuts for longer than usual. Avoid alcoholic drinks or consume only very small amounts while taking this medicine.    If you are taking an anticoagulant:  · Wear a medical alert bracelet.  · Notify your dentist or other caregivers before procedures.  · Avoid contact sports.    · Ask your caregiver how soon you can go back to normal activities. Not being active can lead to new clots. Ask for a list of what you should and should not do.   · Exercise your lower leg muscles. This is important while traveling.   · You may need to wear compression stockings. These are tight elastic stockings that apply pressure to the lower legs. This can help keep the blood in the legs from clotting.   · If you are a smoker, you should quit.   · Learn as much as you can about DVT.     SEEK MEDICAL CARE IF:  · You have unusual bruising or any bleeding problems.  · The swelling or pain in your affected arm or leg is not gradually improving.   · You anticipate surgery or long-distance travel. You should get specific advice on DVT prevention.   · You discover other family members with blood clots. This may require further testing for inherited diseases or conditions.     SEEK IMMEDIATE MEDICAL CARE IF:  · You develop chest pain.  · You develop severe shortness of breath.  · You begin to cough up bloody mucus or phlegm (sputum).  · You feel dizzy or faint.   · You develop swelling or pain in the leg.  · You have breathing problems after traveling.    MAKE SURE YOU:  · Understand these instructions.  · Will watch your  condition.  · Will get help right away if you are not doing well or get worse.     Follow-up Information     1. Call [facility].    Why:  [phone] for scheduling IVC filter removal in next few months.        2. Follow up with Nicholas Darnell M.D.. Schedule an appointment as soon as possible for a visit in 1 month.    Specialty:  Internal Medicine    Contact information    [address] [id number] [phone]           Discharge Medication Instructions:    Below are the medications your physician expects you to take upon discharge:    Review all your home medications and newly ordered medications with your doctor and/or pharmacist. Follow medication instructions as directed by your doctor and/or pharmacist.    Please keep your medication list with you and share with your physician.               Medication List      START taking these medications        Instructions    * rivaroxaban 15 MG Tabs tablet   Last time this was given:  15 mg on 2/4/2017  8:15 AM   Commonly known as:  XARELTO    Take 1 Tab by mouth 2 times a day.   Dose:  15 mg       * rivaroxaban 20 MG Tabs tablet   Last time this was given:  15 mg on 2/4/2017  8:15 AM   Commonly known as:  XARELTO    Doctor's comments:  To be taken after the 21st day of using Xarelto 15mg BID.   Take 1 Tab by mouth with dinner.   Dose:  20 mg       * Notice:  This list has 2 medication(s) that are the same as other medications prescribed for you. Read the directions carefully, and ask your doctor or other care provider to review them with you.      CONTINUE taking these medications        Instructions    acetaminophen 500 MG Tabs   Commonly known as:  TYLENOL    Take 1,000 mg by mouth every 6 hours as needed for Moderate Pain.   Dose:  1000 mg       lisinopril 10 MG Tabs   Last time this was given:  10 mg on 2/3/2017  8:48 PM   Commonly known as:  PRINIVIL    Take 1 Tab by mouth every day. TAKE ONE TABLET BY MOUTH DAILY   Dose:  10  mg       vitamin D3 5000 UNITS Caps    Take 1 Cap by mouth every day.   Dose:  1 Cap         STOP taking these medications     cephALEXin 500 MG Caps   Commonly known as:  KEFLEX               Instructions           Diet / Nutrition:    Follow any diet instructions given to you by your doctor or the dietician, including how much salt (sodium) you are allowed each day.    If you are overweight, talk to your doctor about a weight reduction plan.    Activity:    Remain physically active following your doctor's instructions about exercise and activity.    Rest often.     Any time you become even a little tired or short of breath, SIT DOWN and rest.    Worsening Symptoms:    Report any of the following signs and symptoms to the doctor's office immediately:    *Pain of jaw, arm, or neck  *Chest pain not relieved by medication                               *Dizziness or loss of consciousness  *Difficulty breathing even when at rest   *More tired than usual                                       *Bleeding drainage or swelling of surgical site  *Swelling of feet, ankles, legs or stomach                 *Fever (>100ºF)  *Pink or blood tinged sputum  *Weight gain (3lbs/day or 5lbs /week)           *Shock from internal defibrillator (if applicable)  *Palpitations or irregular heartbeats                *Cool and/or numb extremities    Stroke Awareness    Common Risk Factors for Stroke include:    Age  Atrial Fibrillation  Carotid Artery Stenosis  Diabetes Mellitus  Excessive alcohol consumption  High blood pressure  Overweight   Physical inactivity  Smoking    Warning signs and symptoms of a stroke include:    *Sudden numbness or weakness of the face, arm or leg (especially on one side of the body).  *Sudden confusion, trouble speaking or understanding.  *Sudden trouble seeing in one or both eyes.  *Sudden trouble walking, dizziness, loss of balance or coordination.Sudden severe headache with no known cause.    It is very  important to get treatment quickly when a stroke occurs. If you experience any of the above warning signs, call 911 immediately.                   Disclaimer         Quit Smoking / Tobacco Use:    I understand the use of any tobacco products increases my chance of suffering from future heart disease or stroke and could cause other illnesses which may shorten my life. Quitting the use of tobacco products is the single most important thing I can do to improve my health. For further information on smoking / tobacco cessation call a Toll Free Quit Line at 1-206.118.3050 (*[employer] [place]) or 1-687.847.3401 (American [employer] Association) or you can access the web based program at www.lungusa.org.    [place] Tobacco Users Help Line:  (374) 219-8432       Toll Free: 1-820.273.4583  Quit Tobacco Program [facility] (907)751-0918    Crisis Hotline:    [place] Crisis Hotline:  3-208-CPUTLVR or 1-156.227.6618    [place] Crisis Hotline:    1-100.895.7353 or 205-045-6692    Discharge Survey:   Thank you for choosing [facility]. We hope we did everything we could to make your hospital stay a pleasant one. You may be receiving a phone survey and we would appreciate your time and participation in answering the questions. Your input is very valuable to us in our efforts to improve our service to our patients and their families.        My signature on this form indicates that:    1. I have reviewed and understand the above information.  2. My questions regarding this information have been answered to my satisfaction.  3. I have formulated a plan with my discharge nurse to obtain my prescribed medications for home.                  Disclaimer         __________________________________                     __________       ________                       Patient Signature                                                 Date                    Time

## 2017-02-02 NOTE — PROGRESS NOTES
1000 Report received, plan of care reviewed and discussed, assessment complete, oriented to room, bed alarm on, nonskid socks applied, advised to call for assistance.   1200 Discussed plan of care, encouraged and answered all questions.  1330 Report given to Patricia TEAGUE at [facility] for transfer, transfer to [facility].  Cardiac Summary.  Sinus rhythm with first degree heart block (0.22/0.08/0.40)

## 2017-02-02 NOTE — ED NOTES
"Chief Complaint   Patient presents with   • Shortness of Breath     Started at 2200 last night, \"couldnt open rib cage because of the pain\"   • Muscle Spasms     Right side mid to low back, 9/10 pain at home, bunion surgery Oscar 10       "

## 2017-02-02 NOTE — OR SURGEON
Immediate Post- Operative Note        PostOp Diagnosis: DVT and PE with hanging thrombus.      Procedure(s): IVC filter placement.       Estimated Blood Loss: Less than 5 ml        Complications: None            2/2/2017     1611 PM     Yonatan Chamberlain

## 2017-02-02 NOTE — ED NOTES
Med rec complete per patient.  Patient completed a 10 day course of cephalexin on 01/30.  Allergies reviewed.

## 2017-02-02 NOTE — H&P
IDENTIFICATION:  The patient is a 71-year-old female patient of podiatry, Dr. Burton as well as primary care provider, Dr. Nicholas Darnell.    CHIEF COMPLAINT:  Right-sided back pain for 1-day.    HISTORY OF PRESENT ILLNESS:  The patient is a 71-year-old female who had   surgery on her right foot with Dr. Burton on 1/10/2017.  She was actually   scheduled to have her stitches taken out today.  She states the foot seems to   be healing as expected; however, she has been mostly nonweightbearing on this   foot since 1/10/2017, which has been in the past 3 weeks.  She states that   yesterday she was not feeling well.  She had a mild headache throughout the   day and decreased appetite.  She did have some intermittent nausea yesterday   and today as well and then last night around 10 p.m., she had severe onset of   right-sided back pain, which was very sharp when she would inhale.  It became   very shooting and sharp in nature, so she was breathing very shallowly.  She   became concerned about this and came to the hospital to be evaluated.  Here CT   angiogram of chest does show evidence of pulmonary embolus.  Upon my   interpretation, the burden appears heavier on the right side.  She denies any   fevers or chills.  She has a minimal amount of swelling in the right leg where   she had surgery.  She denies any lightheadedness, dizziness, or chest   palpitations.  She denies any recent trauma other than the recent surgery.    REVIEW OF SYSTEMS:  A 10-point review of systems reviewed and otherwise   negative.    PAST MEDICAL HISTORY:  Essential hypertension and bunions.    OUTPATIENT MEDICATIONS:  Tylenol 1000 mg every 6 hours as needed for pain,   lisinopril 10 mg daily, and vitamin D 5000 units daily.    DRUG ALLERGIES:  PENICILLIN.    SOCIAL HISTORY:  Patient denies any tobacco use now or in the past.  She   drinks 2 glasses of wine per night and denies any drug use.  She lives with   her  in the [place]  area.    FAMILY HISTORY:  Mother had heart failure.  Father had coronary artery disease   and a myocardial infarction.    PHYSICAL EXAMINATION:  VITAL SIGNS:  Temperature 98.4, blood pressure 159/82, heart rate 83,   respiratory rate 20, and oxygen saturation 97% on 2 liters nasal cannula and   initially 77% on room air.  GENERAL:  Patient is a pleasant female.  She is sitting comfortably in no   apparent distress.  She is breathing easily with no accessory muscle use.  HEENT:  Head atraumatic.  Extraocular movements intact.  Pupils equal, round,   reactive to light.  Sclerae clear.  Conjunctivae pink.  Mucous membranes   moist.  NECK:  Supple.  No jugular venous distention.  Trachea midline.  No anterior,   posterior, cervical, or supraclavicular lymphadenopathy.  HEART:  Regular rate and rhythm with no murmur.  Point of maximal impulse   nondisplaced.  LUNGS:  Clear to auscultation bilaterally with diminished breath sounds in the   bases.  CHEST:  Symmetric with respiration.  ABDOMEN:  Soft, nontender, and nondistended with normoactive bowel sounds.  No   palpable hepatosplenomegaly.  EXTREMITIES:  No clubbing or cyanosis.  She does have 1+ edema of the right   lower extremity and postsurgical changes.  No evidence of rash or bruising.    Pedal pulses 2+ bilaterally.  NEUROLOGIC:  Patient is alert and oriented x3.  She is moving all 4   extremities equally.    LABORATORY DATA:  Urinalysis shows trace occult blood.  Beta natriuretic   peptide 18.  Sodium 137, potassium 4.4, and creatinine 1.01.  Troponin less   than 0.02.  Lipase 27.  WBC 17.5, hemoglobin 14.3, and platelets 228.    IMAGING:  CT angiogram of chest shows bilateral lobar and segmental pulmonary   thromboemboli with right lower lobe segmental infarction.  No evidence of   right heart strain.  Indeterminate right hepatic mass, possibly hemangioma and   small right pleural effusion.  Electrocardiogram reviewed by myself shows   some inverted T waves  concerning for right heart strain.    ASSESSMENT AND PLAN:  1.  Acute bilateral pulmonary emboli with right lower lobe infarction.    Patient will be admitted to the hospital.  I will monitor her on telemetry.  I   will start her on Lovenox weight-based therapy, and I did discuss different   options of anticoagulants with her, and she will discuss these with her    to determine which long-term anticoagulant she will be on.  I will   check an echocardiogram to look for evidence of right heart strain and a lower   extremity duplex scan to look for evidence of more clot burden.  2.  Hypertension.  I will monitor blood pressure and treat her with lisinopril   as needed.  3.  Pain, pleuritic in nature, due to pulmonary infarction and pulmonary   emboli.  I will treat her with Dilaudid and Toradol as needed.  4.  Recent foot surgery with sutures in place.  I had placed a call to Dr. Burton to call me back regarding care postoperatively.  5.  Patient is a full code.  She will need greater than 2 midnights stay for   treatment and workup of her acute pulmonary embolus.       ____________________________________     MD KATHERYN ALBERTO / [place]    DD:  02/02/2017 08:51:19  DT:  02/02/2017 10:21:44    D#:  [id number]  Job#:  [id number]

## 2017-02-02 NOTE — ED NOTES
Rounded on patient. Patient updated on plan of care. ACLS ER Tech to transport patient to intpatient. Per patient request today's appointment with Micheal cancelled and office notified of admission.

## 2017-02-03 PROBLEM — I82.402 LEFT LEG DVT (HCC): Status: ACTIVE | Noted: 2017-02-03

## 2017-02-03 LAB
ANION GAP SERPL CALC-SCNC: 6 MMOL/L (ref 0–11.9)
BASOPHILS # BLD AUTO: 0.3 % (ref 0–1.8)
BASOPHILS # BLD: 0.05 K/UL (ref 0–0.12)
BUN SERPL-MCNC: 16 MG/DL (ref 8–22)
CALCIUM SERPL-MCNC: 9.7 MG/DL (ref 8.5–10.5)
CHLORIDE SERPL-SCNC: 106 MMOL/L (ref 96–112)
CO2 SERPL-SCNC: 26 MMOL/L (ref 20–33)
CREAT SERPL-MCNC: 0.81 MG/DL (ref 0.5–1.4)
EOSINOPHIL # BLD AUTO: 0.06 K/UL (ref 0–0.51)
EOSINOPHIL NFR BLD: 0.4 % (ref 0–6.9)
ERYTHROCYTE [DISTWIDTH] IN BLOOD BY AUTOMATED COUNT: 43.1 FL (ref 35.9–50)
GFR SERPL CREATININE-BSD FRML MDRD: >60 ML/MIN/1.73 M 2
GLUCOSE SERPL-MCNC: 132 MG/DL (ref 65–99)
HCT VFR BLD AUTO: 38.3 % (ref 37–47)
HGB BLD-MCNC: 12.3 G/DL (ref 12–16)
IMM GRANULOCYTES # BLD AUTO: 0.07 K/UL (ref 0–0.11)
IMM GRANULOCYTES NFR BLD AUTO: 0.4 % (ref 0–0.9)
LYMPHOCYTES # BLD AUTO: 0.79 K/UL (ref 1–4.8)
LYMPHOCYTES NFR BLD: 4.8 % (ref 22–41)
MCH RBC QN AUTO: 29.9 PG (ref 27–33)
MCHC RBC AUTO-ENTMCNC: 32.1 G/DL (ref 33.6–35)
MCV RBC AUTO: 93 FL (ref 81.4–97.8)
MONOCYTES # BLD AUTO: 1.22 K/UL (ref 0–0.85)
MONOCYTES NFR BLD AUTO: 7.5 % (ref 0–13.4)
NEUTROPHILS # BLD AUTO: 14.12 K/UL (ref 2–7.15)
NEUTROPHILS NFR BLD: 86.6 % (ref 44–72)
NRBC # BLD AUTO: 0 K/UL
NRBC BLD AUTO-RTO: 0 /100 WBC
PLATELET # BLD AUTO: 202 K/UL (ref 164–446)
PMV BLD AUTO: 10.8 FL (ref 9–12.9)
POTASSIUM SERPL-SCNC: 4.4 MMOL/L (ref 3.6–5.5)
RBC # BLD AUTO: 4.12 M/UL (ref 4.2–5.4)
SODIUM SERPL-SCNC: 138 MMOL/L (ref 135–145)
WBC # BLD AUTO: 16.3 K/UL (ref 4.8–10.8)

## 2017-02-03 PROCEDURE — 770020 HCHG ROOM/CARE - TELE (206)

## 2017-02-03 PROCEDURE — 90670 PCV13 VACCINE IM: CPT | Performed by: HOSPITALIST

## 2017-02-03 PROCEDURE — 90471 IMMUNIZATION ADMIN: CPT

## 2017-02-03 PROCEDURE — 700111 HCHG RX REV CODE 636 W/ 250 OVERRIDE (IP): Performed by: HOSPITALIST

## 2017-02-03 PROCEDURE — 85025 COMPLETE CBC W/AUTO DIFF WBC: CPT

## 2017-02-03 PROCEDURE — A9270 NON-COVERED ITEM OR SERVICE: HCPCS | Performed by: INTERNAL MEDICINE

## 2017-02-03 PROCEDURE — 3E0234Z INTRODUCTION OF SERUM, TOXOID AND VACCINE INTO MUSCLE, PERCUTANEOUS APPROACH: ICD-10-PCS | Performed by: INTERNAL MEDICINE

## 2017-02-03 PROCEDURE — 99232 SBSQ HOSP IP/OBS MODERATE 35: CPT | Performed by: HOSPITALIST

## 2017-02-03 PROCEDURE — 700102 HCHG RX REV CODE 250 W/ 637 OVERRIDE(OP): Performed by: INTERNAL MEDICINE

## 2017-02-03 PROCEDURE — 700111 HCHG RX REV CODE 636 W/ 250 OVERRIDE (IP): Performed by: INTERNAL MEDICINE

## 2017-02-03 PROCEDURE — 700102 HCHG RX REV CODE 250 W/ 637 OVERRIDE(OP): Performed by: HOSPITALIST

## 2017-02-03 PROCEDURE — 80048 BASIC METABOLIC PNL TOTAL CA: CPT

## 2017-02-03 PROCEDURE — 700112 HCHG RX REV CODE 229: Performed by: INTERNAL MEDICINE

## 2017-02-03 PROCEDURE — A9270 NON-COVERED ITEM OR SERVICE: HCPCS | Performed by: HOSPITALIST

## 2017-02-03 RX ADMIN — RIVAROXABAN 15 MG: 15 TABLET, FILM COATED ORAL at 17:43

## 2017-02-03 RX ADMIN — STANDARDIZED SENNA CONCENTRATE AND DOCUSATE SODIUM 1 TABLET: 8.6; 5 TABLET, FILM COATED ORAL at 20:46

## 2017-02-03 RX ADMIN — ENOXAPARIN SODIUM 80 MG: 100 INJECTION SUBCUTANEOUS at 08:06

## 2017-02-03 RX ADMIN — KETOROLAC TROMETHAMINE 30 MG: 30 INJECTION, SOLUTION INTRAMUSCULAR; INTRAVENOUS at 17:47

## 2017-02-03 RX ADMIN — LISINOPRIL 10 MG: 10 TABLET ORAL at 20:48

## 2017-02-03 RX ADMIN — PNEUMOCOCCAL 13-VALENT CONJUGATE VACCINE 0.5 ML: 2.2; 2.2; 2.2; 2.2; 2.2; 4.4; 2.2; 2.2; 2.2; 2.2; 2.2; 2.2; 2.2 INJECTION, SUSPENSION INTRAMUSCULAR at 04:33

## 2017-02-03 RX ADMIN — CHOLECALCIFEROL TAB 25 MCG (1000 UNIT) 1000 UNITS: 25 TAB at 20:47

## 2017-02-03 RX ADMIN — DOCUSATE SODIUM 100 MG: 100 CAPSULE ORAL at 08:06

## 2017-02-03 RX ADMIN — KETOROLAC TROMETHAMINE 30 MG: 30 INJECTION, SOLUTION INTRAMUSCULAR; INTRAVENOUS at 04:30

## 2017-02-03 ASSESSMENT — PAIN SCALES - GENERAL
PAINLEVEL_OUTOF10: 0
PAINLEVEL_OUTOF10: 4
PAINLEVEL_OUTOF10: 0
PAINLEVEL_OUTOF10: 4
PAINLEVEL_OUTOF10: 0

## 2017-02-03 ASSESSMENT — ENCOUNTER SYMPTOMS
NERVOUS/ANXIOUS: 0
SENSORY CHANGE: 0
SHORTNESS OF BREATH: 0
EYE DISCHARGE: 0
CHILLS: 0
COUGH: 0
BACK PAIN: 0
DIZZINESS: 0
DEPRESSION: 0
FEVER: 0
VOMITING: 0
HEADACHES: 0
PALPITATIONS: 0
NAUSEA: 0
SPEECH CHANGE: 0
ABDOMINAL PAIN: 0
STRIDOR: 0

## 2017-02-03 NOTE — CARE PLAN
Problem: Communication  Goal: The ability to communicate needs accurately and effectively will improve  Intervention: Educate patient and significant other/support system about the plan of care, procedures, treatments, medications and allow for questions  POC discussed with patient and all questions answered. Patient's spouse called to give update on patient location and plan of care at [facility]       Problem: Safety  Goal: Will remain free from falls  Intervention: Implement fall precautions  Bed locked and in low position with brake set. Treaded slipper socks on patient and call light within reach. Patient in room near nurses station.

## 2017-02-03 NOTE — PROGRESS NOTES
RN called patient's spouse Will to give update on patient location and plan of care. All questions answered.

## 2017-02-03 NOTE — PROGRESS NOTES
Patient transported to IR at this time with ACLS RN and transport. Patient transported on the monitor with ambu bag present. Consent signed and on the chart. IR MD and RN made aware that patient last ate at 1200 today and received 80 mg of Lovenox this morning around 0830.

## 2017-02-03 NOTE — H&P
IDENTIFICATION:  The patient is a 71-year-old female patient of podiatry, Dr. Burton as well as primary care provider, Dr. Nicholas Darnell.     CHIEF COMPLAINT:  Right-sided back pain for 1-day.     HISTORY OF PRESENT ILLNESS:  The patient is a 71-year-old female who had    surgery on her right foot with Dr. Burton on 1/10/2017.  She was actually    scheduled to have her stitches taken out today.  She states the foot seems to    be healing as expected; however, she has been mostly nonweightbearing on this    foot since 1/10/2017, which has been in the past 3 weeks.  She states that    yesterday she was not feeling well.  She had a mild headache throughout the    day and decreased appetite.  She did have some intermittent nausea yesterday    and today as well and then last night around 10 p.m., she had severe onset of    right-sided back pain, which was very sharp when she would inhale.  It became    very shooting and sharp in nature, so she was breathing very shallowly.  She    became concerned about this and came to the hospital to be evaluated.  Here CT   angiogram of chest does show evidence of pulmonary embolus.  Upon my    interpretation, the burden appears heavier on the right side.  She denies any    fevers or chills.  She has a minimal amount of swelling in the right leg where   she had surgery.  She denies any lightheadedness, dizziness, or chest    palpitations.  She denies any recent trauma other than the recent surgery.     REVIEW OF SYSTEMS:  A 10-point review of systems reviewed and otherwise    negative.     PAST MEDICAL HISTORY:  Essential hypertension and bunions.     OUTPATIENT MEDICATIONS:  Tylenol 1000 mg every 6 hours as needed for pain,    lisinopril 10 mg daily, and vitamin D 5000 units daily.     DRUG ALLERGIES:  PENICILLIN.     SOCIAL HISTORY:  Patient denies any tobacco use now or in the past.  She    drinks 2 glasses of wine per night and denies any drug use.  She lives with    her   in the [place] area.     FAMILY HISTORY:  Mother had heart failure.  Father had coronary artery disease   and a myocardial infarction.     PHYSICAL EXAMINATION:  VITAL SIGNS:  Temperature 98.4, blood pressure 159/82, heart rate 83,    respiratory rate 20, and oxygen saturation 97% on 2 liters nasal cannula and    initially 77% on room air.  GENERAL:  Patient is a pleasant female.  She is sitting comfortably in no    apparent distress.  She is breathing easily with no accessory muscle use.  HEENT:  Head atraumatic.  Extraocular movements intact.  Pupils equal, round,    reactive to light.  Sclerae clear.  Conjunctivae pink.  Mucous membranes    moist.  NECK:  Supple.  No jugular venous distention.  Trachea midline.  No anterior,    posterior, cervical, or supraclavicular lymphadenopathy.  HEART:  Regular rate and rhythm with no murmur.  Point of maximal impulse    nondisplaced.  LUNGS:  Clear to auscultation bilaterally with diminished breath sounds in the   bases.  CHEST:  Symmetric with respiration.  ABDOMEN:  Soft, nontender, and nondistended with normoactive bowel sounds.  No   palpable hepatosplenomegaly.  EXTREMITIES:  No clubbing or cyanosis.  She does have 1+ edema of the right    lower extremity and postsurgical changes.  No evidence of rash or bruising.     Pedal pulses 2+ bilaterally.  NEUROLOGIC:  Patient is alert and oriented x3.  She is moving all 4    extremities equally.     LABORATORY DATA:  Urinalysis shows trace occult blood.  Beta natriuretic    peptide 18.  Sodium 137, potassium 4.4, and creatinine 1.01.  Troponin less    than 0.02.  Lipase 27.  WBC 17.5, hemoglobin 14.3, and platelets 228.     IMAGING:  CT angiogram of chest shows bilateral lobar and segmental pulmonary    thromboemboli with right lower lobe segmental infarction.  No evidence of    right heart strain.  Indeterminate right hepatic mass, possibly hemangioma and   small right pleural effusion.  Electrocardiogram reviewed by  myself shows    some inverted T waves concerning for right heart strain.     ASSESSMENT AND PLAN:  1.  Acute bilateral pulmonary emboli with right lower lobe infarction.     Patient will be admitted to the hospital.  I will monitor her on telemetry.  I   will start her on Lovenox weight-based therapy, and I did discuss different    options of anticoagulants with her, and she will discuss these with her     to determine which long-term anticoagulant she will be on.  I will    check an echocardiogram to look for evidence of right heart strain and a lower   extremity duplex scan to look for evidence of more clot burden.    I reviewed leg duplex scan results with the radiologist who states the clot   Within the right leg is free-floating and then I discussed IVC filter placement  With Dr. Chamberlain who will assist with this. I also discussed the option of alteplase  With Dr. Savage from pulmonary medicine and he felt it was not indicated  Acutely as the patient is able to maintain oxygen saturations and is eating and   Speaking with no shortness of breath on 2 liters nasal canula.    2.  Hypertension.  I will monitor blood pressure and treat her with lisinopril   as needed.  3.  Pain, pleuritic in nature, due to pulmonary infarction and pulmonary    emboli.  I will treat her with Dilaudid and Toradol as needed.  4.  Recent foot surgery with sutures in place.  I discussed the case with Dr. Burton her podiatrist who states she has one pin in place within the bone  But not the joint and this can be removed by himself at the hospital or  After discharge as it can remain in place for up to two weeks with no issues for  Removal.  5.  Patient is a full code.  She will need greater than 2 midnights stay for    treatment and workup of her acute pulmonary embolus.  I will transfer her to   [facility] for IVC filter placement.        ____________________________________     LC GUTHRIE MD     OPB /  [place]     DD:  02/02/2017 08:51:19  DT:  02/02/2017 10:21:44

## 2017-02-03 NOTE — DISCHARGE PLANNING
This 71 year old female lives in [place] with her , Kade ([phone]).  She admitted yesterday, 2/2, to the ICU with acute pulmonary emboli with a right lower lobe infarction.  She has been on SQ Lovenox since admit and is to start on Coumadin.  She may need to be on Xarelto after discharge.  Her health insurance is [employer] in case she needs to know what her copay would be.  Her discharge plan is likely to return home.  She is currently on O2 @ 2L/min by NC and could possibly need home oxygen after discharge.  [employer] contracts with [facility] and [facility].

## 2017-02-03 NOTE — PROGRESS NOTES
Report received from ZAHIDA Velarde. All questions answered and awaiting patient arrival via ambulance at this time.

## 2017-02-03 NOTE — DISCHARGE SUMMARY
DISCHARGE DIAGNOSES:  1.  Submassive pulmonary embolus.  2.  Free-floating deep venous thrombosis.  3.  Right heart strain.  4.  Acute hypoxia.  5.  Right lung lobe infarct.  6.  Recent right foot surgery for bunionectomy with pin in place and sutures.  7.  Essential hypertension.    HOSPITAL COURSE:  The patient is a 71-year-old female who presented to the   hospital with right side pain of acute onset.  She was evaluated in the   emergency department and had had recent bunionectomy on 01/10/2017 with Dr. Burton and was only partially weightbearing on this foot.  She, on CT scan,   was noted to have a large amount of clot burden, more on the right than the   left, and evidence of right pulmonary lobe infarct.  She, on lower extremity   duplex study, was noted to have deep venous thrombosis and after discussion   with the radiologist, this clot was found to be nonocclusive, but did appear   to be somewhat loose within the vein.  These results were discussed with   pulmonary critical care medicine, Dr. Ten Savage who agreed with no   thrombolytics with alteplase at this time given her recent surgery and that   she had been given a dose of weight-based Lovenox at 8:40 a.m., but rather   transferred to [facility] for inferior vena cava filter   placement.  Therefore, patient being transferred acutely for this filter   placement.       ____________________________________     MD KATHERYN ALBERTO / [place]    DD:  02/02/2017 13:04:50  DT:  02/03/2017 10:29:55    D#:  [id number]  Job#:  [id number]

## 2017-02-03 NOTE — PROGRESS NOTES
Sutures removed from right lower extremity by RN. Patient tolerated well. Skin is approximated with no drainage noted.

## 2017-02-03 NOTE — PROGRESS NOTES
Hospital Medicine Progress Note, Adult, Complex               Author: Kenneth Newman Date & Time created: 2/3/2017  7:48 AM     Interval History:  ID: [id number] with pulmonary embolism and DVT with a loosely adherent thrombus.  Sent here from [facility] and had IVC filter placed.  Speech clear.    Today: Awake.  Some chest discomfort right lower chest with deeper inspiration.  No leg pain.  Clear speech and oriented. Checking oxygen demands and discussed with RN.  Discussed risks of anticoagulation and activities to avoid as well as no ASA nor NSAIDS.    Review of Systems:  Review of Systems   Constitutional: Negative for fever and chills.   Eyes: Negative for discharge.   Respiratory: Negative for cough, shortness of breath and stridor.    Cardiovascular: Positive for chest pain (with deep inspiration). Negative for palpitations and leg swelling.   Gastrointestinal: Negative for nausea, vomiting and abdominal pain.   Genitourinary: Negative for dysuria and hematuria.   Musculoskeletal: Negative for back pain and joint pain.   Skin: Negative for rash.   Neurological: Negative for dizziness, sensory change, speech change and headaches.   Psychiatric/Behavioral: Negative for depression. The patient is not nervous/anxious.        Physical Exam:  Physical Exam   Constitutional: She is oriented to person, place, and time. No distress.   HENT:   Head: Normocephalic and atraumatic.   Nose: Nose normal.   Mouth/Throat: Oropharynx is clear and moist. No oropharyngeal exudate.   Eyes: Conjunctivae and EOM are normal. Right eye exhibits no discharge. Left eye exhibits no discharge. No scleral icterus.   Neck: Normal range of motion. No tracheal deviation present.   Cardiovascular: Normal rate, regular rhythm, normal heart sounds and intact distal pulses.    No murmur heard.  Pulmonary/Chest: Effort normal and breath sounds normal. No stridor. No respiratory distress. She has no wheezes.   Abdominal: Soft. Bowel sounds  are normal. She exhibits no distension. There is no tenderness. There is no rebound.   Musculoskeletal: She exhibits no edema.   Right foot with pin in 3rd toe.  Sutures removed.     Lymphadenopathy:     She has no cervical adenopathy.   Neurological: She is alert and oriented to person, place, and time. No cranial nerve deficit.   Skin: Skin is warm and dry. She is not diaphoretic.   Psychiatric: She has a normal mood and affect.   Vitals reviewed.      Labs:        Invalid input(s): [id number]  Recent Labs      17   TROPONINI  <0.02   BNPBTYPENAT  18     Recent Labs      17   SODIUM  137  138   POTASSIUM  4.4  4.4   CHLORIDE  103  106   CO2  26  26   BUN  22  16   CREATININE  1.01  0.81   CALCIUM  10.1  9.7     Recent Labs      17   ALTSGPT  17   --    ASTSGOT  14   --    ALKPHOSPHAT  131*   --    TBILIRUBIN  0.5   --    LIPASE  27   --    GLUCOSE  137*  132*     Recent Labs      17   RBC  4.70  4.12*   HEMOGLOBIN  14.3  12.3   HEMATOCRIT  42.8  38.3   PLATELETCT  228  202     Recent Labs      17   WBC  17.5*  16.3*   NEUTSPOLYS  86.00*  86.60*   LYMPHOCYTES  6.40*  4.80*   MONOCYTES  6.20  7.50   EOSINOPHILS  0.30  0.40   BASOPHILS  0.50  0.30   ASTSGOT  14   --    ALTSGPT  17   --    ALKPHOSPHAT  131*   --    TBILIRUBIN  0.5   --            Hemodynamics:  Temp (24hrs), Av.1 °C (98.7 °F), Min:36.8 °C (98.3 °F), Max:37.3 °C (99.2 °F)  Temperature: 36.9 °C (98.4 °F)  Pulse  Av.9  Min: 71  Max: 93Heart Rate (Monitored): 80  Blood Pressure : 118/60 mmHg, NIBP: 129/52 mmHg     Respiratory:    Respiration: (!) 26, Pulse Oximetry: 93 %, O2 Daily Delivery Respiratory : Silicone Nasal Cannula     Work Of Breathing / Effort: Mild  RUL Breath Sounds: Clear, RML Breath Sounds: Clear, RLL Breath Sounds: Diminished, CINDY Breath Sounds: Clear, LLL Breath Sounds:  Diminished  Fluids:    Intake/Output Summary (Last 24 hours) at 02/03/17 0748  Last data filed at 02/03/17 0600   Gross per 24 hour   Intake    350 ml   Output    500 ml   Net   -150 ml     Weight: 82.4 kg (181 lb 10.5 oz)  GI/Nutrition:  Orders Placed This Encounter   Procedures   • Diet Order     Standing Status: Standing      Number of Occurrences: 1      Standing Expiration Date:      Order Specific Question:  Diet:     Answer:  Regular [1]     Medical Decision Making, by Problem:  1.  Pulmonary Embolism:       - starting Xarelto.  Stop lovenox       - check home oxygen needs    2. Loosely adherent DVT      - Had IVC filter placed during admit.  Patient will need future removed.      - etiology likley due to recent right leg surgery and immobilization    3. Hypertension:      - lisinopril    4. Recent right bunionextomy and foot surgery      - orthopedist aware of patient and suture removed today    DISPO:  Possibly home in am.  Need to varify oxygen demands.      Medications reviewed, Labs reviewed and Radiology images reviewed  Shabazz catheter: No Shabazz      DVT: xarelto.

## 2017-02-03 NOTE — PROGRESS NOTES
Dr Elaine paged to clarify bed rest orders for IVC filter placement. Patient cleared from bed rest. Vascular still being monitored as ordered. Please see MAR.

## 2017-02-03 NOTE — PROGRESS NOTES
Patient transported back to [facility] at this time with transport and ACLS RN. Patient transported with monitor and ambu bag present. Femoral dressing assessed with RN and to be assessed per active orders. Dressing C/D/I with no pain present per patient.

## 2017-02-03 NOTE — CARE PLAN
Problem: Safety  Goal: Will remain free from falls  Intervention: Implement fall precautions  Educate patient to use call light for assistance to prevent falls.      Problem: Pain Management  Goal: Pain level will decrease to patient’s comfort goal  Intervention: Follow pain managment plan developed in collaboration with patient and Interdisciplinary Team  Patient complained of 6/10 pain. Medicated per MAR (see MAR). Pt reassessed and resting comfortably

## 2017-02-04 VITALS
SYSTOLIC BLOOD PRESSURE: 118 MMHG | BODY MASS INDEX: 28.79 KG/M2 | RESPIRATION RATE: 13 BRPM | HEART RATE: 90 BPM | DIASTOLIC BLOOD PRESSURE: 60 MMHG | HEIGHT: 67 IN | WEIGHT: 183.42 LBS | TEMPERATURE: 98.4 F | OXYGEN SATURATION: 94 %

## 2017-02-04 PROBLEM — Z95.828 S/P INSERTION OF IVC (INFERIOR VENA CAVAL) FILTER: Status: ACTIVE | Noted: 2017-02-04

## 2017-02-04 PROCEDURE — 700102 HCHG RX REV CODE 250 W/ 637 OVERRIDE(OP): Performed by: HOSPITALIST

## 2017-02-04 PROCEDURE — 99239 HOSP IP/OBS DSCHRG MGMT >30: CPT | Performed by: HOSPITALIST

## 2017-02-04 PROCEDURE — A9270 NON-COVERED ITEM OR SERVICE: HCPCS | Performed by: HOSPITALIST

## 2017-02-04 RX ADMIN — RIVAROXABAN 15 MG: 15 TABLET, FILM COATED ORAL at 08:15

## 2017-02-04 ASSESSMENT — PAIN SCALES - GENERAL
PAINLEVEL_OUTOF10: 0

## 2017-02-04 NOTE — DISCHARGE INSTRUCTIONS
Foot Fracture  Your caregiver has diagnosed you as having a foot fracture (broken bone). Your foot has many bones. You have a fracture, or break, in one of these bones. In some cases, your doctor may put on a splint or removable fracture boot until the swelling in your foot has lessened. A cast may or may not be required.  HOME CARE INSTRUCTIONS   If you do not have a cast or splint:  You may bear weight on your injured foot as tolerated or advised.   Do not put any weight on your injured foot for as long as directed by your caregiver. Slowly increase the amount of time you walk on the foot as the pain and swelling allows or as advised.   Use crutches until you can bear weight without pain. A gradual increase in weight bearing may help.   Apply ice to the injury for 15-20 minutes each hour while awake for the first 2 days. Put the ice in a plastic bag and place a towel between the bag of ice and your skin.   If an ace bandage (stretchy, elastic wrapping bandage) was applied, you may re-wrap it if ankle is more painful or your toes become cold and swollen.   If you have a cast or splint:  Use your crutches for as long as directed by your caregiver.   To lessen the swelling, keep the injured foot elevated on pillows while lying down or sitting. Elevate your foot above your heart.   Apply ice to the injury for 15-20 minutes each hour while awake for the first 2 days. Put the ice in a plastic bag and place a thin towel between the bag of ice and your cast.   Plaster or fiberglass cast:   Do not try to scratch the skin under the cast using a sharp or pointed object down the cast.   Check the skin around the cast every day. You may put lotion on any red or sore areas.   Keep your cast clean and dry.   Plaster splint:   Wear the splint until you are seen for a follow-up examination.   You may loosen the elastic around the splint if your toes become numb, tingle, or turn blue or cold. Do not rest it on anything harder than  a pillow in the first 24 hours.   Do not put pressure on any part of your splint. Use your crutches as directed.   Keep your splint dry. It can be protected during bathing with a plastic bag. Do not lower the splint into water.   If you have a fracture boot you may remove it to shower. Bear weight only as instructed by your caregiver.   Only take over-the-counter or prescription medicines for pain, discomfort, or fever as directed by your caregiver.   SEEK IMMEDIATE MEDICAL CARE IF:   Your cast gets damaged or breaks.   You have continued severe pain or more swelling than you did before the cast was put on.   Your skin or nails of your casted foot turn blue, gray, feel cold or numb.   There is a bad smell from your cast.   There is severe pain with movement of your toes.   There are new stains and/or drainage coming from under the cast.   MAKE SURE YOU:   Understand these instructions.   Will watch your condition.   Will get help right away if you are not doing well or get worse.   Document Released: 12/15/2001 Document Revised: 03/11/2013 Document Reviewed: 01/21/2010  ExitCare® Patient Information ©2013 [employer].If this Referral to the anticoagulation clinic is being ordered with a Referral to home health, then schedule the anticoagulation visit after the home health treatments are completed.  Inferior Vena Cava Filter Insertion  Insertion of an inferior vena cava (IVC) filter is a procedure in which a filter is placed into the large vein in your abdomen that carries blood from the lower part of your body to your heart (inferior vena cava). Placement of the filter here helps prevent blood clots in the legs or pelvis from traveling to your lungs. A large blood clot in the lungs can cause death.   The filter is a small, metal device about an inch long. It is shaped like the spokes of an umbrella and is inserted through a pathway created in your neck or groin. The risks of this procedure are usually small and  easily managed. Inferior vena cava filters are only used when blood thinners (anticoagulants) cannot be used to prevent blood clots from forming. This may occur because of:  1. You have severe platelet problems or shortage.  2. You have had recent or current major bleeding that cannot be treated.  3. You have bleeding associated with anticoagulants.  4. You have recurrence of blood clots while on anticoagulants.  5. You have a need for surgery in the near future.  6. You have bleeding in your head.  EXPECTATIONS OF A FILTER  1. An IVC filter will reduce the risk of a large blood clot making its way to your lungs (pulmonary embolus, or PE). It cannot eliminate the risk completely, or prevent small PEs from occurring.  2. It does not stop blood clots from growing, recurring, or developing into postphlebitic syndrome. This is a condition that can occur after there is inflammation in a vein (phlebitis).  LET YOUR HEALTH CARE PROVIDER KNOW ABOUT:  1. Any allergies you have. This includes an allergy to iodine or contrast dye.  2. All medicines you are taking, including vitamins, herbs, eye drops, creams and over-the-counter medicines.  3. Previous problems you or members of your family have had with the use of anesthetics.  4. Any blood disorders you have.  5. Previous surgeries you had had.  6. Medical conditions you have.  7. Possibility of pregnancy, if this applies.  RISKS AND COMPLICATIONS  Generally, this is a safe procedure. However, as with any procedure, problems can occur. Possible problems include:  1. A small bruise around the needle insertion site. A larger pooling of blood called a hematoma may form. This is usually of no concern.  2. The filter can block the vena cava. This can cause some swelling of the legs.  3. The filter may eventually fail and not work properly.  4. Continued bleeding or infections (uncommon).  5. Damage to the vein by the catheter (rare).  BEFORE THE PROCEDURE  1. Your health care  provider may want you to have blood tests. These tests can help tell how well your kidneys and liver are working. They can also show how well your blood clots.  2. If you take blood thinners, ask your health care provider if and when you should stop taking them.  3. Do not eat or drink for 4 hours before the procedure or as directed by your health care provider.  4. Make arrangements for someone to drive you home. Depending on the procedure, you may be  able to go home the same day.   PROCEDURE   1. Insertion of a Vena Cava filter is performed by a vascular surgeon or cardiologist in a cardiac catheterization lab.  2. The procedure usually takes about 30 minutes to 1 hour. This can vary.  3. An IV needle will be inserted into one of your veins. Medicine will be able to flow directly into your body through this needle.  4. Medicines may given to help you relax and relieve anxiety (sedative).  5. The procedure is done through a large vein either in your neck or groin. The skin around this area is cleaned and shaved, as necessary.  6. The skin and deeper tissues over the vein will be made numb with a local anesthetic. You are awake during the procedure and can let your health care providers know if you have discomfort.  7. A needle is then put into the vein. A guide wire is placed through the needle and into the vein. This is used to help insert a catheter and the IVC filter into your vein.  8. Contrast dye may be injected into the inferior vena cava to help guide the catheter and verify precise placement of the IVC filter. X-ray equipment may also be used to verify that the catheter and the wire are in the correct position.  9. The wire is then withdrawn.  10. The IVC filter is then passed over the catheter into the vein, and inserted into the correct location in the vena cava.  11. The catheter is then removed. Pressure will be kept on the needle insertion point for several minutes or until it is unlikely to  bleed.  AFTER THE PROCEDURE  · You will stay in a recovery area until any sedation medicine you were given has worn off. Your blood pressure and  pulse will be checked.  · If there are no problems, you should be able to go home after the procedure.  · You may feel sore at the area of the needle insertion for a few days.     This information is not intended to replace advice given to you by your health care provider. Make sure you discuss any questions you have with your health care provider.     Document Released: 02/07/2007 Document Revised: 01/08/2016 Document Reviewed: 08/25/2014  [employer] Patient Education ©2016 [employer]  Discharge Instructions    Discharged to home by car with relative. Discharged via wheelchair, hospital escort: Yes.  Special equipment needed: Not Applicable    Be sure to schedule a follow-up appointment with your primary care doctor or any specialists as instructed.     Discharge Plan:   Diet Plan: Discussed  Activity Level: Discussed  Confirmed Follow up Appointment: Patient to Call and Schedule Appointment  Confirmed Symptoms Management: Discussed  Medication Reconciliation Updated: Yes  Pneumococcal Vaccine Given - only chart on this line when given: Given (See MAR)  Influenza Vaccine Indication: Not indicated: Previously immunized this influenza season and > 8 years of age    I understand that a diet low in cholesterol, fat, and sodium is recommended for good health. Unless I have been given specific instructions below for another diet, I accept this instruction as my diet prescription.   Other diet: Regular    Special Instructions:   Deep Vein Thrombosis Discharge Instructions    A deep vein thrombosis (DVT) is a blood clot (thrombus) that develops in a deep vein. A DVT is a clot in the deep, larger veins of the leg, arm, or pelvis. These are more dangerous than clots that might form in veins on the surface of the body. Deep vein thrombosis can lead to complications if  the clot breaks off and travels in the bloodstream to the lungs.     CAUSES  Blood clots form in a vein for different reasons. Usually several things cause blood clots. They include:   · The flow of blood slows down.   · The inside of the vein is damaged in some way.   · The person has a condition that makes blood clot more easily. These conditions may include:  · Older age (especially over 75 years old).  · Having a history of blood clots.  · Having major or lengthy surgery. Hip surgery is particularly high-risk.   · Breaking a hip or leg.  · Sitting or lying still for a long time.  · Cancer or cancer treatment.  · Having a long, thin tube (catheter) placed inside a vein during a medical procedure.   · Being overweight (obese).  · Pregnancy and childbirth.  · Medicines with estrogen.  · Smoking.  · Other circulation or heart problems.     SYMPTOMS  When a clot forms, it can either partially or totally block the blood flow in that vein. Symptoms of a DVT can include:  · Swelling of the leg or arm, especially if one side is much worse.  · Warmth and redness of the leg or arm, especially if one side is much worse.   · Pain in an arm or leg. If the clot is in the leg, symptoms may be more noticeable or worse when standing or walking.  If the blood clot travels to the lung, it may cause:  · Shortness of breath.  · Chest pain. The pain may be worsened by deep breaths.   · Coughing up thick mucus (phlegm), possibly flecked with blood.   Anyone with these symptoms should get emergency medical treatment right away. Call your local emergency  Services (911 in [place] if you have these symptoms.     DIAGNOSIS  If a DVT is suspected, your caregiver will take a full medical history. He or she will also perform a physical exam. Tests that also may be required include:   · Studies of the clotting properties of the blood.   · An ultrasound scan.   · X-rays to show the flow of blood when special dye is injected into the veins  (venography).   · Studies of your lungs if you have any chest symptoms.     PREVENTION  · Exercise the legs regularly. Take a brisk 30 minute walk every day.   · Maintain a weight that is appropriate for your height.  · Avoid sitting or lying in bed for long periods of time without moving your legs.   · Women, particularly those over the age of 35, should consider the risks and benefits of taking estrogen medicine, including birth control pills.   · Do not smoke, especially if you take estrogen medicines.   · Long-distance travel can increase your risk. You should exercise your legs by walking or pumping the muscles every hour.   · In hospital prevention: Prevention may include medical and non medical measures.     TREATMENT  · The most common treatment for DVT is blood thinning (anticoagulant) medicine, which reduces the blood's tendency to clot. Anticoagulants can stop new blood clots from forming and old ones from growing. They cannot dissolve existing clots. Your body does this by itself over time. Anticoagulants can be given by mouth, by intravenous (IV) access, or by injection. Your caregiver will determine the best program for you.   · Less commonly, clot-dissolving drugs (thrombolytics) are used to dissolve a DVT. They carry a high risk of bleeding, so they are used mainly in severe cases.   · Very rarely, a blood clot in the leg needs to be removed surgically.   · If you are unable to take anticoagulants, your caregiver may arrange for you to have a filter placed in a main vein in your belly (abdomen). This filter prevents clots from traveling to your lungs.     HOME CARE INSTRUCTIONS  Take all medicines prescribed by your caregiver. Follow the directions carefully.   · You will most likely continue taking anticoagulants after you leave the hospital. Your caregiver will advise you on the length of treatment (usually 3 to 5 months, sometimes for life).   · Taking too much or too little of an anticoagulant is  dangerous. While taking this type of medicine, you will need to have regular blood tests to be sure the dose is correct. The dose can change for many reasons. It is critically important that you take this medicine exactly as prescribed, and that you have blood tests exactly as directed.   · Many foods can interfere with anticoagulants. These include foods high in vitamin K, such as spinach, kale, broccoli, cabbage, junaid and turnip greens, [place] sprouts, peas, cauliflower, seaweed, parsley, beef and pork liver, green tea, and soybean oil. Your caregiver should discuss limits on these foods with you or you should arrange a visit with a dietician to answer your questions.   · Many medicines can interfere with anticoagulants. You must tell your caregiver about any and all medicines you take. This includes all vitamins and supplements. Be especially cautious with aspirin and anti-inflammatory medicines. Ask your caregiver before taking these.   · Anticoagulants can have side effects, mostly excessive bruising or bleeding. You will need to hold pressure over cuts for longer than usual. Avoid alcoholic drinks or consume only very small amounts while taking this medicine.    If you are taking an anticoagulant:  · Wear a medical alert bracelet.  · Notify your dentist or other caregivers before procedures.  · Avoid contact sports.    · Ask your caregiver how soon you can go back to normal activities. Not being active can lead to new clots. Ask for a list of what you should and should not do.   · Exercise your lower leg muscles. This is important while traveling.   · You may need to wear compression stockings. These are tight elastic stockings that apply pressure to the lower legs. This can help keep the blood in the legs from clotting.   · If you are a smoker, you should quit.   · Learn as much as you can about DVT.     SEEK MEDICAL CARE IF:  · You have unusual bruising or any bleeding problems.  · The swelling or pain  in your affected arm or leg is not gradually improving.   · You anticipate surgery or long-distance travel. You should get specific advice on DVT prevention.   · You discover other family members with blood clots. This may require further testing for inherited diseases or conditions.     SEEK IMMEDIATE MEDICAL CARE IF:  · You develop chest pain.  · You develop severe shortness of breath.  · You begin to cough up bloody mucus or phlegm (sputum).  · You feel dizzy or faint.   · You develop swelling or pain in the leg.  · You have breathing problems after traveling.    MAKE SURE YOU:  · Understand these instructions.  · Will watch your condition.  Will get help right away if you are not doing well or get worse.     · Is patient discharged on Warfarin / Coumadin?   No     · Is patient Post Blood Transfusion?  No    Depression / Suicide Risk    As you are discharged from this [facility] facility, it is important to learn how to keep safe from harming yourself.    Recognize the warning signs:  · Abrupt changes in personality, positive or negative- including increase in energy   · Giving away possessions  · Change in eating patterns- significant weight changes-  positive or negative  · Change in sleeping patterns- unable to sleep or sleeping all the time   · Unwillingness or inability to communicate  · Depression  · Unusual sadness, discouragement and loneliness  · Talk of wanting to die  · Neglect of personal appearance   · Rebelliousness- reckless behavior  · Withdrawal from people/activities they love  · Confusion- inability to concentrate     If you or a loved one observes any of these behaviors or has concerns about self-harm, here's what you can do:  · Talk about it- your feelings and reasons for harming yourself  · Remove any means that you might use to hurt yourself (examples: pills, rope, extension cords, firearm)  · Get professional help from the community (Mental Health, Substance Abuse, psychological  counseling)  · Do not be alone:Call your Safe Contact- someone whom you trust who will be there for you.  · Call your local CRISIS HOTLINE 226-2269 or 330-539-3708  · Call your local Children's Mobile Crisis Response Team [place] (118) 044-0108 or www.[employer]  · Call the toll free National Suicide Prevention Hotlines   · National Suicide Prevention Lifeline 182-537-VIWG (3094)  · National [employer] Line Network 800-SUICIDE (042-6425)      Deep Vein Thrombosis Discharge Instructions    A deep vein thrombosis (DVT) is a blood clot (thrombus) that develops in a deep vein. A DVT is a clot in the deep, larger veins of the leg, arm, or pelvis. These are more dangerous than clots that might form in veins on the surface of the body. Deep vein thrombosis can lead to complications if the clot breaks off and travels in the bloodstream to the lungs.     CAUSES  Blood clots form in a vein for different reasons. Usually several things cause blood clots. They include:   · The flow of blood slows down.   · The inside of the vein is damaged in some way.   · The person has a condition that makes blood clot more easily. These conditions may include:  · Older age (especially over 75 years old).  · Having a history of blood clots.  · Having major or lengthy surgery. Hip surgery is particularly high-risk.   · Breaking a hip or leg.  · Sitting or lying still for a long time.  · Cancer or cancer treatment.  · Having a long, thin tube (catheter) placed inside a vein during a medical procedure.   · Being overweight (obese).  · Pregnancy and childbirth.  · Medicines with estrogen.  · Smoking.  · Other circulation or heart problems.     SYMPTOMS  When a clot forms, it can either partially or totally block the blood flow in that vein. Symptoms of a DVT can include:  · Swelling of the leg or arm, especially if one side is much worse.  · Warmth and redness of the leg or arm, especially if one side is much worse.   · Pain in an arm or  leg. If the clot is in the leg, symptoms may be more noticeable or worse when standing or walking.  If the blood clot travels to the lung, it may cause:  · Shortness of breath.  · Chest pain. The pain may be worsened by deep breaths.   · Coughing up thick mucus (phlegm), possibly flecked with blood.   Anyone with these symptoms should get emergency medical treatment right away. Call your local emergency  Services (911 in [place] if you have these symptoms.     DIAGNOSIS  If a DVT is suspected, your caregiver will take a full medical history. He or she will also perform a physical exam. Tests that also may be required include:   · Studies of the clotting properties of the blood.   · An ultrasound scan.   · X-rays to show the flow of blood when special dye is injected into the veins (venography).   · Studies of your lungs if you have any chest symptoms.     PREVENTION  · Exercise the legs regularly. Take a brisk 30 minute walk every day.   · Maintain a weight that is appropriate for your height.  · Avoid sitting or lying in bed for long periods of time without moving your legs.   · Women, particularly those over the age of 35, should consider the risks and benefits of taking estrogen medicine, including birth control pills.   · Do not smoke, especially if you take estrogen medicines.   · Long-distance travel can increase your risk. You should exercise your legs by walking or pumping the muscles every hour.   · In hospital prevention: Prevention may include medical and non medical measures.     TREATMENT  · The most common treatment for DVT is blood thinning (anticoagulant) medicine, which reduces the blood's tendency to clot. Anticoagulants can stop new blood clots from forming and old ones from growing. They cannot dissolve existing clots. Your body does this by itself over time. Anticoagulants can be given by mouth, by intravenous (IV) access, or by injection. Your caregiver will determine the best program for you.    · Less commonly, clot-dissolving drugs (thrombolytics) are used to dissolve a DVT. They carry a high risk of bleeding, so they are used mainly in severe cases.   · Very rarely, a blood clot in the leg needs to be removed surgically.   · If you are unable to take anticoagulants, your caregiver may arrange for you to have a filter placed in a main vein in your belly (abdomen). This filter prevents clots from traveling to your lungs.     HOME CARE INSTRUCTIONS  Take all medicines prescribed by your caregiver. Follow the directions carefully.   · You will most likely continue taking anticoagulants after you leave the hospital. Your caregiver will advise you on the length of treatment (usually 3 to 5 months, sometimes for life).   · Taking too much or too little of an anticoagulant is dangerous. While taking this type of medicine, you will need to have regular blood tests to be sure the dose is correct. The dose can change for many reasons. It is critically important that you take this medicine exactly as prescribed, and that you have blood tests exactly as directed.   · Many foods can interfere with anticoagulants. These include foods high in vitamin K, such as spinach, kale, broccoli, cabbage, junaid and turnip greens, [place] sprouts, peas, cauliflower, seaweed, parsley, beef and pork liver, green tea, and soybean oil. Your caregiver should discuss limits on these foods with you or you should arrange a visit with a dietician to answer your questions.   · Many medicines can interfere with anticoagulants. You must tell your caregiver about any and all medicines you take. This includes all vitamins and supplements. Be especially cautious with aspirin and anti-inflammatory medicines. Ask your caregiver before taking these.   · Anticoagulants can have side effects, mostly excessive bruising or bleeding. You will need to hold pressure over cuts for longer than usual. Avoid alcoholic drinks or consume only very small  amounts while taking this medicine.    If you are taking an anticoagulant:  · Wear a medical alert bracelet.  · Notify your dentist or other caregivers before procedures.  · Avoid contact sports.    · Ask your caregiver how soon you can go back to normal activities. Not being active can lead to new clots. Ask for a list of what you should and should not do.   · Exercise your lower leg muscles. This is important while traveling.   · You may need to wear compression stockings. These are tight elastic stockings that apply pressure to the lower legs. This can help keep the blood in the legs from clotting.   · If you are a smoker, you should quit.   · Learn as much as you can about DVT.     SEEK MEDICAL CARE IF:  · You have unusual bruising or any bleeding problems.  · The swelling or pain in your affected arm or leg is not gradually improving.   · You anticipate surgery or long-distance travel. You should get specific advice on DVT prevention.   · You discover other family members with blood clots. This may require further testing for inherited diseases or conditions.     SEEK IMMEDIATE MEDICAL CARE IF:  · You develop chest pain.  · You develop severe shortness of breath.  · You begin to cough up bloody mucus or phlegm (sputum).  · You feel dizzy or faint.   · You develop swelling or pain in the leg.  · You have breathing problems after traveling.    MAKE SURE YOU:  · Understand these instructions.  · Will watch your condition.  · Will get help right away if you are not doing well or get worse.

## 2017-02-04 NOTE — CARE PLAN
Problem: Safety  Goal: Will remain free from injury  Intervention: Provide assistance with mobility  Patient mobilized with assistance      Problem: Venous Thromboembolism (VTW)/Deep Vein Thrombosis (DVT) Prevention:  Goal: Patient will participate in Venous Thrombosis (VTE)/Deep Vein Thrombosis (DVT)Prevention Measures  Intervention: Assess and monitor for anticoagulation complications  q2 monitoring of pulses while in ICU

## 2017-02-04 NOTE — PROGRESS NOTES
Report called to nurse taking bed 814#2    Pt stable. No complications at this time. Patient on Room air.

## 2017-02-04 NOTE — DISCHARGE PLANNING
MARLY received pt's prescriptions for Xarelto - faxed to [facility] on [address] Copay is $31.50 for the 15 mg and $45 for the 20 mg. MARLY notified RN.

## 2017-02-04 NOTE — CARE PLAN
Problem: Safety  Goal: Will remain free from injury  Intervention: Provide assistance with mobility  Patient calls for assistance when required.       Problem: Infection  Goal: Will remain free from infection  Intervention: Assess signs and symptoms of infection  Daily s/s of infection assessed.

## 2017-02-04 NOTE — CARE PLAN
Problem: Infection  Goal: Will remain free from infection  Intervention: Assess signs and symptoms of infection  Monitor vital signs, use adequate hand hygiene to ensure      Problem: Respiratory:  Goal: Respiratory status will improve  Intervention: Collaborate with respiratory therapist and Interdisciplinary Team on treatment measures to improve respiratory function  Titrate O2 with silicone NC to maintain adequate oxygenation

## 2017-02-05 NOTE — DISCHARGE SUMMARY
PRIMARY CARE PHYSICIAN:  Nicholas Darnell MD.    OUTPATIENT PODIATRIST:  Ten Burton MD    DISCHARGE DIAGNOSES:  1.  Pulmonary embolism.  2.  Acute deep vein thrombosis of right leg with a free floating thrombus.  3.  Status post inferior vena cava retrievable filter.  4.  History of hypertension.  5.  Recent right bunionectomy/foot surgery.  6.  Hepatic masses, which need future outpatient followup.    COURSE OF HOSPITALIZATION:  This is a 71-year-old female.  She was transferred   from [facility] with concern of her free floating deep vein   thrombosis in the presence of pulmonary embolism.  Patient was admitted with   the need for getting an IVC filter prior to receiving further anticoagulation.    Patient had IVC filter placed without any issues.  She was given   instructions on need of having this removed in the future after repeat imaging   has shown no ongoing loosely adherent DVT.  The patient was given   instructions/education on medications and activities to avoid while on   anticoagulation.   on date of discharge was available to check if   insurance would be able to cover her Xarelto and help with her low copay,   which they were.  Patient was able to afford this.  Patient is a rancher, does   work with many animals, is instructed to watch her activities again   protecting herself while on anticoagulation.  The patient has absolute great   follow up with Dr. Nicholas Darnell.  I recommend she follow up with her primary   care.  Likely etiology of her deep vein thrombosis was recent foot surgery,   which is made her immobilized for the past week.  Recommend following up with   Dr. Burton for ongoing activities.  Recommend a normal healthy heart diet.    IMAGING AND PROCEDURES:  CT abdomen and pelvis February 2nd showed no CT   evidence of acute inflammatory abdominal pelvic abnormality, mild colonic   diverticulosis without evidence of diverticulitis.  Multiple indeterminant    hepatic masses most likely hemangiomas, although less likely benign process   are not excluded.  Multiphase MRI or CT could help characterize left renal   parapelvic cysts.  The patient had IVC filter placed 2/2/2017 with a Celect   retrievable inferior vena cava filter.  Please note patient was given number   of radiology scheduling for future followup removal of this.  CT of the chest   from 2/2/2017 showed segmental pulmonary thrombo-emboli of the right lower   lobe _____ findings to support right heart strain indeterminant of right   hepatic masses _____ less benign solid mass or dense cyst, small right pleural   effusion.  Echocardiogram was showing ejection fraction 70%, mild tricuspid   regurgitation, grade I diastolic dysfunction, normal left ventricular systolic   function.    DISCHARGE DIET:  Recommend healthy balanced diet.    DISCHARGE MEDICATIONS:  1.  Xarelto 15 mg 1 tablet twice a day for 21 days.  On day 22, she is to   start 20 mg daily.  2.  Tylenol 1000 mg every 6 hours as needed for moderate pain.  3.  Lisinopril 10 mg daily.  4.  Vitamin D 5000 units every day.    Please note that 38 minutes spent on discharge process throughout the day on   the patient.  I have recommended patient follow up with Dr. Nathaniel Hampton within   the next 2 weeks.  Recommend follow up with radiology for future removal of   IVC filter in the next 2 months.  After further evaluation of deep vein   thrombosis, follow up with her podiatrist for ongoing restrictions on her   right foot healing.       ____________________________________     DO NATALIA RAMON / [place]    DD:  02/05/2017 07:44:16  DT:  02/05/2017 08:08:38    D#:  [id number]  Job#:  [id number]    cc: NATHANIEL HAMPTON MD

## 2017-02-09 LAB — EKG IMPRESSION: NORMAL

## 2017-03-02 ENCOUNTER — OFFICE VISIT (OUTPATIENT)
Dept: MEDICAL GROUP | Facility: MEDICAL CENTER | Age: 72
End: 2017-03-02
Payer: MEDICARE

## 2017-03-02 VITALS
BODY MASS INDEX: 27.89 KG/M2 | OXYGEN SATURATION: 96 % | TEMPERATURE: 97.4 F | DIASTOLIC BLOOD PRESSURE: 76 MMHG | RESPIRATION RATE: 16 BRPM | HEART RATE: 83 BPM | SYSTOLIC BLOOD PRESSURE: 128 MMHG | HEIGHT: 68 IN | WEIGHT: 184 LBS

## 2017-03-02 DIAGNOSIS — I26.99 OTHER PULMONARY EMBOLISM WITHOUT ACUTE COR PULMONALE, UNSPECIFIED CHRONICITY (HCC): ICD-10-CM

## 2017-03-02 DIAGNOSIS — Z95.828 S/P INSERTION OF IVC (INFERIOR VENA CAVAL) FILTER: ICD-10-CM

## 2017-03-02 PROCEDURE — G8420 CALC BMI NORM PARAMETERS: HCPCS | Performed by: INTERNAL MEDICINE

## 2017-03-02 PROCEDURE — 3017F COLORECTAL CA SCREEN DOC REV: CPT | Performed by: INTERNAL MEDICINE

## 2017-03-02 PROCEDURE — 1036F TOBACCO NON-USER: CPT | Performed by: INTERNAL MEDICINE

## 2017-03-02 PROCEDURE — 99214 OFFICE O/P EST MOD 30 MIN: CPT | Performed by: INTERNAL MEDICINE

## 2017-03-02 PROCEDURE — 3288F FALL RISK ASSESSMENT DOCD: CPT | Performed by: INTERNAL MEDICINE

## 2017-03-02 PROCEDURE — 3014F SCREEN MAMMO DOC REV: CPT | Mod: 8P | Performed by: INTERNAL MEDICINE

## 2017-03-02 PROCEDURE — 4040F PNEUMOC VAC/ADMIN/RCVD: CPT | Performed by: INTERNAL MEDICINE

## 2017-03-02 PROCEDURE — G8432 DEP SCR NOT DOC, RNG: HCPCS | Performed by: INTERNAL MEDICINE

## 2017-03-02 PROCEDURE — 0518F FALL PLAN OF CARE DOCD: CPT | Mod: 8P | Performed by: INTERNAL MEDICINE

## 2017-03-02 PROCEDURE — 1100F PTFALLS ASSESS-DOCD GE2>/YR: CPT | Performed by: INTERNAL MEDICINE

## 2017-03-02 PROCEDURE — 1111F DSCHRG MED/CURRENT MED MERGE: CPT | Performed by: INTERNAL MEDICINE

## 2017-03-02 PROCEDURE — G8482 FLU IMMUNIZE ORDER/ADMIN: HCPCS | Performed by: INTERNAL MEDICINE

## 2017-03-02 NOTE — PROGRESS NOTES
CC: Follow-up hospitalization pulmonary embolism.    HPI:   Katy presents today with the following.    1. Other pulmonary embolism without acute cor pulmonale, unspecified chronicity (CMS-HCC)  Presents after foot surgery and subsequent shortness of breath leading her to the ER showing bilateral pulmonary embolism. Imaging showed unstable clot in the extremity and decision to put a filter in was made. Patient is now on full dose xarelto reporting no sources of bleeding. She denies any chest pain or increasing lower extremity edema. Her breathing is improving with time. Overall she feels fairly well and is now ambulatory on her foot. She has no previous clotting history and denies any family history of clotting disorders.    2. S/P insertion of IVC (inferior vena caval) filter  Again no firm discussion about plans moving forward about IVC removal.      Patient Active Problem List    Diagnosis Date Noted   • S/P insertion of IVC (inferior vena caval) filter 02/04/2017   • Left leg DVT (CMS-[facility]) 02/03/2017   • Pulmonary emboli (CMS-[facility]) 02/02/2017   • Bunion of great toe of right foot 01/10/2017   • Essential hypertension 05/02/2016   • Osteoarthrosis involving lower leg 11/05/2013   • Arthritis 05/16/2012       Current Outpatient Prescriptions   Medication Sig Dispense Refill   • rivaroxaban (XARELTO) 20 MG Tab tablet Take 1 Tab by mouth with dinner. 30 Tab 3   • acetaminophen (TYLENOL) 500 MG Tab Take 1,000 mg by mouth every 6 hours as needed for Moderate Pain.     • lisinopril (PRINIVIL) 10 MG Tab Take 1 Tab by mouth every day. TAKE ONE TABLET BY MOUTH DAILY 90 Tab 3   • Cholecalciferol (VITAMIN D3) 5000 UNITS CAPS Take 1 Cap by mouth every day.       No current facility-administered medications for this visit.         Allergies as of 03/02/2017 - Yonatan as Reviewed 03/02/2017   Allergen Reaction Noted   • Pcn [penicillins] Rash 05/16/2012        ROS: As per HPI.    /76 mmHg  Pulse 83  Temp(Src) 36.3 °C (97.4  "°F)  Resp 16  Ht 1.727 m (5' 8\")  Wt 83.462 kg (184 lb)  BMI 27.98 kg/m2  SpO2 96%    Physical Exam:  Gen:         Alert and oriented, No apparent distress.  Neck:        No Lymphadenopathy or Bruits.  Lungs:     Clear to auscultation bilaterally  CV:          Regular rate and rhythm. No murmurs, rubs or gallops.               Ext:          No clubbing, cyanosis, edema.      Assessment and Plan.   71 y.o. female with the following issues.    1. Other pulmonary embolism without acute cor pulmonale, unspecified chronicity (CMS-HCC)  This point continue Xarelto for at least the next 6 months with reevaluation likely at that time.  - REFERRAL TO ANTICOAGULATION MONITORING    2. S/P insertion of IVC (inferior vena caval) filter  Have placed a referral to the anticoagulation clinic who I still believe monitors IVC filters. Certainly will defer to their expertise regarding further imaging and possible vertebral of the filter.  - REFERRAL TO ANTICOAGULATION MONITORING        "

## 2017-03-02 NOTE — MR AVS SNAPSHOT
"        Katy Perez   3/2/2017 2:00 PM   Office Visit   MRN: [id number]    Department:  [address] [place] [address]   Dept Phone:  [phone]    Description:  Female : 1945   Provider:  Nicholas Darnell M.D.           Reason for Visit     Hospital Follow-up DVT Filter      Allergies as of 3/2/2017     Allergen Noted Reactions    Pcn [Penicillins] 2012   Rash      You were diagnosed with     Other pulmonary embolism without acute cor pulmonale, unspecified chronicity (CMS-HCC)   [2054126]       S/P insertion of IVC (inferior vena caval) filter   [[id number]]         Vital Signs     Blood Pressure Pulse Temperature Respirations Height Weight    128/76 mmHg 83 36.3 °C (97.4 °F) 16 1.727 m (5' 8\") 83.462 kg (184 lb)    Body Mass Index Oxygen Saturation Smoking Status             27.98 kg/m2 96% Never Smoker          Basic Information     Date Of Birth Sex Race Ethnicity Preferred Language    1945 Female White Non- English      Your appointments     May 18, 2017  1:40 PM   Established Patient with Nicholas Darnell M.D.   [facility] [address]    [address]  [facility] [address]  [facility] [id number] [phone]           You will be receiving a confirmation call a few days before your appointment from our automated call confirmation system.              Problem List              ICD-10-CM Priority Class Noted - Resolved    Arthritis (Chronic) M19.90   2012 - Present    Osteoarthrosis involving lower leg M17.9   2013 - Present    Essential hypertension I10   2016 - Present    Bunion of great toe of right foot M21.611   1/10/2017 - Present    Pulmonary emboli (CMS-HCC) I26.99   2017 - Present    Left leg DVT (CMS-HCC) I82.402   2/3/2017 - Present    S/P insertion of IVC (inferior vena caval) filter Z95.828   2017 - Present      Health Maintenance        Date Due Completion Dates    IMM ZOSTER VACCINE 2007 ---    MAMMOGRAM 2017 (Originally 2014) " 4/11/2013 (Done), 6/1/2012    Override on 4/11/2013: Done    BONE DENSITY 3/3/2020 3/3/2015, 6/1/2012    COLONOSCOPY 6/11/2022 6/11/2012, 6/5/2012 (Done)    Override on 6/5/2012: Done    IMM DTaP/Tdap/Td Vaccine (3 - Td) 8/19/2023 8/19/2013, 5/16/2007            Current Immunizations     13-VALENT PCV PREVNAR 2/3/2017  4:33 AM    Influenza TIV (IM) 10/16/2013  2:08 PM, 10/16/2011    Influenza Vaccine Adult HD 10/15/2016    Influenza Vaccine Quad Inj (Preserved) 10/30/2015, 10/17/2014    Pneumococcal polysaccharide vaccine (PPSV-23) 5/16/2012    Tdap Vaccine 8/19/2013  2:24 PM, 5/16/2007      Below and/or attached are the medications your provider expects you to take. Review all of your home medications and newly ordered medications with your provider and/or pharmacist. Follow medication instructions as directed by your provider and/or pharmacist. Please keep your medication list with you and share with your provider. Update the information when medications are discontinued, doses are changed, or new medications (including over-the-counter products) are added; and carry medication information at all times in the event of emergency situations     Allergies:  PCN - Rash               Medications  Valid as of: March 02, 2017 -  2:34 PM    Generic Name Brand Name Tablet Size Instructions for use    Acetaminophen (Tab) TYLENOL 500 MG Take 1,000 mg by mouth every 6 hours as needed for Moderate Pain.        Cholecalciferol (Cap) vitamin D3 5000 UNITS Take 1 Cap by mouth every day.        Lisinopril (Tab) PRINIVIL 10 MG Take 1 Tab by mouth every day. TAKE ONE TABLET BY MOUTH DAILY        Rivaroxaban (Tab) XARELTO 20 MG Take 1 Tab by mouth with dinner.        .                 Medicines prescribed today were sent to:     [facility] #[id number] - [address] - [place] [address]    Phone: [phone] Fax: [phone]    Open 24 Hours?: No      Medication refill instructions:       If your prescription  bottle indicates you have medication refills left, it is not necessary to call your provider’s office. Please contact your pharmacy and they will refill your medication.    If your prescription bottle indicates you do not have any refills left, you may request refills at any time through one of the following ways: The online [email or web address] system (except Urgent Care), by calling your provider’s office, or by asking your pharmacy to contact your provider’s office with a refill request. Medication refills are processed only during regular business hours and may not be available until the next business day. Your provider may request additional information or to have a follow-up visit with you prior to refilling your medication.   *Please Note: Medication refills are assigned a new Rx number when refilled electronically. Your pharmacy may indicate that no refills were authorized even though a new prescription for the same medication is available at the pharmacy. Please request the medicine by name with the pharmacy before contacting your provider for a refill.        Referral     A referral request has been sent to our patient care coordination department. Please allow 3-5 business days for us to process this request and contact you either by phone or mail. If you do not hear from us by the 5th business day, please call us at [phone].        Other Notes About Your Plan     Patient enrolled in [facility] with Dr. Nicholas Darnell.           [email or web address] Access Code: Activation code not generated  Current [email or web address] Status: Active

## 2017-03-15 ENCOUNTER — ANTICOAGULATION VISIT (OUTPATIENT)
Dept: VASCULAR LAB | Facility: MEDICAL CENTER | Age: 72
End: 2017-03-15
Attending: INTERNAL MEDICINE
Payer: MEDICARE

## 2017-03-15 DIAGNOSIS — I82.401 ACUTE DEEP VEIN THROMBOSIS (DVT) OF RIGHT LOWER EXTREMITY, UNSPECIFIED VEIN (HCC): ICD-10-CM

## 2017-03-15 DIAGNOSIS — Z95.828 S/P INSERTION OF IVC (INFERIOR VENA CAVAL) FILTER: ICD-10-CM

## 2017-03-15 DIAGNOSIS — I26.99 PULMONARY EMBOLISM, OTHER: ICD-10-CM

## 2017-03-15 PROBLEM — I82.409 DEEP VEIN THROMBOSIS (HCC): Status: ACTIVE | Noted: 2017-03-15

## 2017-03-15 LAB
INR BLD: 1 (ref 0.9–1.2)
INR PPP: 1 (ref 2–3.5)

## 2017-03-15 PROCEDURE — 99203 OFFICE O/P NEW LOW 30 MIN: CPT | Performed by: NURSE PRACTITIONER

## 2017-03-15 PROCEDURE — 85610 PROTHROMBIN TIME: CPT

## 2017-03-15 NOTE — PROGRESS NOTES
Health Status Since Last Assessment  Pt recently diagnosed with PE earlier this month. US showed acute deep venous thrombosis within the right popliteal vein extending distally into a posterior tibial vein, peroneal vein and  gastrocnemius vein. IVF placed due to concern of free floating clot. Recent bunion surgery which required about a month of bed rest due to extensive tendon injury. Denies ever having any blood clots in the past and no family hx.     She was started on Xarelto 15 mg by mouth twice a day and transitioned to 20 mg once daily after 3 weeks. She is taking this medication exactly as instructed.. Education given to patient regarding instructions for taking Xarelto, importance of strict compliance with this medication, drug/drug interactions and signs/symptoms of bleeding or thrombosis. She understands he is not to stop taking this medication without first talking with his doctor. Education given to patient regarding instructions for taking warfarin, food/drug interactions, drug/drug interactions and signs/symptoms of bleeding or thrombosis. Discussed smoking cessation, avoidance of OCPs. She has refills for Xarelto and is okay with her co-pay.      Adherence with DOAC Therapy  None  BLEEDING RISK ASSESSMENT NB:    Bleeding Risk Assessment  None    Latest hemoglobin:   Lab Results   Component Value Date/Time    WBC 16.3* 02/03/2017 04:40 AM    RBC 4.12* 02/03/2017 04:40 AM    HEMOGLOBIN 12.3 02/03/2017 04:40 AM    HEMATOCRIT 38.3 02/03/2017 04:40 AM    MCV 93.0 02/03/2017 04:40 AM    MCH 29.9 02/03/2017 04:40 AM    MCHC 32.1* 02/03/2017 04:40 AM    MPV 10.8 02/03/2017 04:40 AM    NEUTROPHILS-POLYS 86.60* 02/03/2017 04:40 AM    LYMPHOCYTES 4.80* 02/03/2017 04:40 AM    MONOCYTES 7.50 02/03/2017 04:40 AM    EOSINOPHILS 0.40 02/03/2017 04:40 AM    BASOPHILS 0.30 02/03/2017 04:40 AM      EtOH overuse - no  Falls, presyncope, syncope, or seizures - no  Uncontrolled hypertension - no  CREATININE CLEARANCE  /    Creatinine Clearance/Renal Function  Latest eGFR / creatinine:  Lab Results   Component Value Date/Time    SODIUM 138 02/03/2017 04:40 AM    POTASSIUM 4.4 02/03/2017 04:40 AM    CHLORIDE 106 02/03/2017 04:40 AM    CO2 26 02/03/2017 04:40 AM    GLUCOSE 132* 02/03/2017 04:40 AM    BUN 16 02/03/2017 04:40 AM    CREATININE 0.81 02/03/2017 04:40 AM      Cockcroft & Gault (Actual body weight): 85.9 (ml/min)  If YES, calculate CrCl (see back)  Any recent dehydrating illness or medications added/changed? i.e. diuretics    Drug Interactions  Avoiding ASA / other antiplatelets, NSAID    Other drug interactions (Review med list / OTCs;)  Current Outpatient Prescriptions on File Prior to Visit   Medication Sig Dispense Refill   • rivaroxaban (XARELTO) 20 MG Tab tablet Take 1 Tab by mouth with dinner. 30 Tab 3   • acetaminophen (TYLENOL) 500 MG Tab Take 1,000 mg by mouth every 6 hours as needed for Moderate Pain.     • lisinopril (PRINIVIL) 10 MG Tab Take 1 Tab by mouth every day. TAKE ONE TABLET BY MOUTH DAILY 90 Tab 3   • Cholecalciferol (VITAMIN D3) 5000 UNITS CAPS Take 1 Cap by mouth every day.       No current facility-administered medications on file prior to visit.       Examination  WNL    Final Assessment and Recommendations:  Patient appears stable from the anticoagulation standpoint  Benefits of continued DOAC therapy outweigh risks for this patient  Recommend continue current DOAC at same dose    Other Actions:   The rationale for continued DOAC therapy  The potential for minor, major or life-threatening bleeding  Dosing instructions, adherence, risks of non-adherence, handling missed doses  Avoiding OTC ASA & NSAIDs & minimizing EtOH to reduce bleeding risks  Dosing around upcoming procedure / surgery if applicable (see back)    Medication: CONTINUE TAKING XARELTO 20 MG ONCE DAILY WITH DINNER   - CONTINUE TO MONITOR FOR S/SX OF BLEEDING  -  REFILLS BEFORE YOU RUN OUT  - TRY NOT TO MISS DOSES  - DO NOT  STOP TAKING XARELTO UNTIL YOU GET PERMISSION FROM YOUR DOCTOR OR OUR CLINIC    Next Appointment: Wednesday, April 26, 2017 at 2:00 pm.     Serenity VILA

## 2017-03-15 NOTE — MR AVS SNAPSHOT
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   [157308]       Pulmonary embolism, other (CMS-[facility])   [3432205]         Vital Signs     Smoking Status                   Never Smoker            Basic Information     Date Of Birth Sex Race Ethnicity Preferred Language    1945 Female White Non- English      Your appointments     2017  2:00 PM   Established Patient with [facility] EXAM 4   [facility]  (--)    [address] [address]   [phone]            May 18, 2017  1:40 PM   Established Patient with Nicholas Darnell M.D.   [facility] [address]    [address]  [address]  [place] [address] [phone]           You will be receiving a confirmation call a few days before your appointment from our automated call confirmation system.              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 Z95.828   2017 - Present    Deep vein thrombosis (CMS-[facility]) [I82.409] I82.409   3/15/2017 - Present      Health Maintenance        Date Due  Completion Dates    IMM ZOSTER VACCINE 5/11/2007 ---    MAMMOGRAM 12/16/2017 (Originally 4/11/2014) 4/11/2013 (Done), 6/1/2012    Override on 4/11/2013: Done    BONE DENSITY 3/3/2020 3/3/2015, 6/1/2012    COLONOSCOPY 6/11/2022 6/11/2012, 6/5/2012 (Done)    Override on 6/5/2012: Done    IMM DTaP/Tdap/Td Vaccine (3 - Td) 8/19/2023 8/19/2013, 5/16/2007            Results     POCT Protime      Component    INR    1.0                        Current Immunizations     13-VALENT PCV PREVNAR 2/3/2017  4:33 AM    Influenza TIV (IM) 10/16/2013  2:08 PM, 10/16/2011    Influenza Vaccine Adult HD 10/15/2016    Influenza Vaccine Quad Inj (Preserved) 10/30/2015, 10/17/2014    Pneumococcal polysaccharide vaccine (PPSV-23) 5/16/2012    Tdap Vaccine 8/19/2013  2:24 PM, 5/16/2007      Below and/or attached are the medications your provider expects you to take. Review all of your home medications and newly ordered medications with your provider and/or pharmacist. Follow medication instructions as directed by your provider and/or pharmacist. Please keep your medication list with you and share with your provider. Update the information when medications are discontinued, doses are changed, or new medications (including over-the-counter products) are added; and carry medication information at all times in the event of emergency situations     Allergies:  PCN - Rash               Medications  Valid as of: March 15, 2017 -  2:28 PM    Generic Name Brand Name Tablet Size Instructions for use    Acetaminophen (Tab) TYLENOL 500 MG Take 1,000 mg by mouth every 6 hours as needed for Moderate Pain.        Cholecalciferol (Cap) vitamin D3 5000 UNITS Take 1 Cap by mouth every day.        Lisinopril (Tab) PRINIVIL 10 MG Take 1 Tab by mouth every day. TAKE ONE TABLET BY MOUTH DAILY        Rivaroxaban (Tab) XARELTO 20 MG Take 1 Tab by mouth with dinner.        .                 Medicines prescribed today were sent to:     [id number] - [place] [address] [place] [address]    Phone: [phone] Fax: [phone]    Open 24 Hours?: No      Medication refill instructions:       If your prescription bottle indicates you have medication refills left, it is not necessary to call your provider’s office. Please contact your pharmacy and they will refill your medication.    If your prescription bottle indicates you do not have any refills left, you may request refills at any time through one of the following ways: The online [employer] system (except Urgent Care), by calling your provider’s office, or by asking your pharmacy to contact your provider’s office with a refill request. Medication refills are processed only during regular business hours and may not be available until the next business day. Your provider may request additional information or to have a follow-up visit with you prior to refilling your medication.   *Please Note: Medication refills are assigned a new Rx number when refilled electronically. Your pharmacy may indicate that no refills were authorized even though a new prescription for the same medication is available at the pharmacy. Please request the medicine by name with the pharmacy before contacting your provider for a refill.        Other Notes About Your Plan     Patient enrolled in [facility] with Dr. Nicholas Darnell.        Warfarin Dosing Calendar   March 2017 Details    Sun Mon Tue Wed Thu Fri Sat        1               2               3               4                 5               6               7               8               9               10               11                 12               13               14               15   1.0      See details      16               17               18                 19               20               21               22               23               24               25                 26               27               28               29               30                31                 Date Details   03/15 This INR check   INR: 1.0      Date of next INR: No date specified              MyChart Access Code: Activation code not generated  Current MyChart Status: Active

## 2017-04-14 ENCOUNTER — HOSPITAL ENCOUNTER (OUTPATIENT)
Facility: MEDICAL CENTER | Age: 72
End: 2017-04-14
Attending: SURGERY | Admitting: SURGERY
Payer: MEDICARE

## 2017-04-18 ENCOUNTER — TELEPHONE (OUTPATIENT)
Dept: VASCULAR LAB | Facility: MEDICAL CENTER | Age: 72
End: 2017-04-18

## 2017-04-18 NOTE — TELEPHONE ENCOUNTER
Chart reviewed in accordance with IVC filter protocol. Pt is scheduled to have IVC filter removed by Dr. Pacheco on 5/3/17.    Manda VILA

## 2017-04-25 ENCOUNTER — APPOINTMENT (OUTPATIENT)
Dept: ADMISSIONS | Facility: MEDICAL CENTER | Age: 72
End: 2017-04-25
Payer: MEDICARE

## 2017-04-26 ENCOUNTER — TELEPHONE (OUTPATIENT)
Dept: VASCULAR LAB | Facility: MEDICAL CENTER | Age: 72
End: 2017-04-26

## 2017-04-26 ENCOUNTER — APPOINTMENT (OUTPATIENT)
Dept: ADMISSIONS | Facility: MEDICAL CENTER | Age: 72
End: 2017-04-26
Attending: SURGERY
Payer: MEDICARE

## 2017-04-26 ENCOUNTER — ANTICOAGULATION VISIT (OUTPATIENT)
Dept: VASCULAR LAB | Facility: MEDICAL CENTER | Age: 72
End: 2017-04-26
Attending: INTERNAL MEDICINE
Payer: MEDICARE

## 2017-04-26 VITALS — BODY MASS INDEX: 29.13 KG/M2 | HEIGHT: 67 IN | WEIGHT: 185.63 LBS

## 2017-04-26 DIAGNOSIS — Z95.828 S/P INSERTION OF IVC (INFERIOR VENA CAVAL) FILTER: ICD-10-CM

## 2017-04-26 LAB — INR PPP: 1.2 (ref 2–3.5)

## 2017-04-26 PROCEDURE — 85610 PROTHROMBIN TIME: CPT

## 2017-04-26 PROCEDURE — 99212 OFFICE O/P EST SF 10 MIN: CPT

## 2017-04-26 NOTE — Clinical Note
Dr Bloch, Patient is wondering about when her treatment with Xarelto can be discontinued. I am here on Wednesdays only so I might not be able to follow in adequate timing but I have Zeny also CC'd. Thank you.

## 2017-04-26 NOTE — PROGRESS NOTES
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 Referring Internal Medicine [phone]    [facility] Responsible  [phone]        Anticoagulation Patient Findings      Patient was recently diagnosed 02/03/2017 with an acute deep venous thrombosis within the right popliteal vein extending distally into a posterior tibial vein, peroneal vein and  gastrocnemius vein. And a Pulmonary embolism.    Patient had a bunion surgery which required about a month of bed rest due to extensive tendon injury. Per Dr Nicholas Darnell MD.    Patient had IVC filter with a Celect retrievable inferior vena cava filter placed due to concern of free floating clot.     Patient has a follow up appt with Dr Pacheco to have IVC filter removed.       Health Status Since Last Assessment  Patient denies any new relevant medical problems, ED visits or hospitalizations  Patient denies any embolic events    Adherence with DOAC Therapy  None- Patient has not missed any doses in the average week  BLEEDING RISK ASSESSMENT NB:    Bleeding Risk Assessment  None    EtOH overuse - no  Falls, presyncope, syncope, or seizures - no  Uncontrolled hypertension - no  CREATININE CLEARANCE /    Creatinine Clearance/Renal Function  Latest eGFR: 02/03/2017  Cockcroft & Gault (Actual body weight): 87.1 (ml/min)    Lab Results    Component  Value  Date/Time      SODIUM  138  02/03/2017 04:40 AM      POTASSIUM  4.4  02/03/2017 04:40 AM      CHLORIDE   106  02/03/2017 04:40 AM      CO2  26  02/03/2017 04:40 AM      GLUCOSE  132*  02/03/2017 04:40 AM      BUN  16  02/03/2017 04:40 AM      CREATININE  0.81  02/03/2017 04:40 AM          Drug Interactions   ASA / other antiplatelets, NSAID- Patient avoids    Current Outpatient Prescriptions on File Prior to Visit    Medication  Sig  Dispense  Refill    •  rivaroxaban (XARELTO) 20 MG Tab tablet  Take 1 Tab by mouth with dinner.  30 Tab  3    •  acetaminophen (TYLENOL) 500 MG Tab  Take 1,000 mg by mouth every 6 hours as needed for Moderate Pain.        •  lisinopril (PRINIVIL) 10 MG Tab  Take 1 Tab by mouth every day. TAKE ONE TABLET BY MOUTH DAILY  90 Tab  3    •  Cholecalciferol (VITAMIN D3) 5000 UNITS CAPS  Take 1 Cap by mouth every day.            No current facility-administered medications on file prior to visit.        Examination  WNL    Final Assessment and Recommendations:  Patient appears stable from the anticoagulation standpoint  Benefits of continued DOAC therapy outweigh risks for this patient  Recommend continue current DOAC at same dose  Patient states she has upcoming procedure to remove IVC filter in may.    Other Actions:   The rationale for continued DOAC therapy  The potential for minor, major or life-threatening bleeding  Dosing instructions, adherence, risks of non-adherence, handling missed doses    Medication:   CONTINUE TAKING XARELTO 20 MG ONCE DAILY WITH DINNER  CONTINUE TO MONITOR FOR S/SX OF BLEEDING   REFILLS BEFORE YOU RUN OUT  TRY NOT TO MISS DOSES  DO NOT STOP TAKING XARELTO UNTIL YOU GET PERMISSION FROM YOUR DOCTOR OR OUR CLINIC        Tereso BeverlyD

## 2017-04-27 LAB — INR BLD: 1.2 (ref 0.9–1.2)

## 2017-04-27 NOTE — TELEPHONE ENCOUNTER
Patient has completed nearly 3 months of anticoagulation for DVT and PE occuring after bunion surgery.    Patient due to have IVC filter removed.      Due to the relatively extensive nature of thrombus, unless PCP has other recommendations, I would suggest six months of oral anticoagulation followed by antiplatelet therapy and close surveillance.    Will ask patient to come into vascular medicine clinic to discuss.    Michael Bloch, MD  Anticoagulation Clinic    Cc:  Nicholas Darnell MD

## 2017-05-04 ENCOUNTER — HOSPITAL ENCOUNTER (OUTPATIENT)
Facility: MEDICAL CENTER | Age: 72
End: 2017-05-04
Attending: SURGERY | Admitting: SURGERY
Payer: MEDICARE

## 2017-05-04 ENCOUNTER — APPOINTMENT (OUTPATIENT)
Dept: RADIOLOGY | Facility: MEDICAL CENTER | Age: 72
End: 2017-05-04
Attending: SURGERY
Payer: MEDICARE

## 2017-05-04 VITALS
BODY MASS INDEX: 28.65 KG/M2 | DIASTOLIC BLOOD PRESSURE: 64 MMHG | OXYGEN SATURATION: 98 % | TEMPERATURE: 98 F | SYSTOLIC BLOOD PRESSURE: 133 MMHG | HEIGHT: 67 IN | HEART RATE: 58 BPM | RESPIRATION RATE: 16 BRPM | WEIGHT: 182.54 LBS

## 2017-05-04 DIAGNOSIS — I82.401 ACUTE DEEP VEIN THROMBOSIS (DVT) OF RIGHT LOWER EXTREMITY, UNSPECIFIED VEIN (HCC): ICD-10-CM

## 2017-05-04 LAB
ANION GAP SERPL CALC-SCNC: 8 MMOL/L (ref 0–11.9)
BASOPHILS # BLD AUTO: 1.1 % (ref 0–1.8)
BASOPHILS # BLD: 0.07 K/UL (ref 0–0.12)
BUN SERPL-MCNC: 20 MG/DL (ref 8–22)
CALCIUM SERPL-MCNC: 10.1 MG/DL (ref 8.5–10.5)
CHLORIDE SERPL-SCNC: 107 MMOL/L (ref 96–112)
CO2 SERPL-SCNC: 22 MMOL/L (ref 20–33)
CREAT SERPL-MCNC: 0.95 MG/DL (ref 0.5–1.4)
EOSINOPHIL # BLD AUTO: 0.13 K/UL (ref 0–0.51)
EOSINOPHIL NFR BLD: 2 % (ref 0–6.9)
ERYTHROCYTE [DISTWIDTH] IN BLOOD BY AUTOMATED COUNT: 45.1 FL (ref 35.9–50)
GFR SERPL CREATININE-BSD FRML MDRD: 58 ML/MIN/1.73 M 2
GLUCOSE SERPL-MCNC: 105 MG/DL (ref 65–99)
HCT VFR BLD AUTO: 43.2 % (ref 37–47)
HGB BLD-MCNC: 14.5 G/DL (ref 12–16)
IMM GRANULOCYTES # BLD AUTO: 0.02 K/UL (ref 0–0.11)
IMM GRANULOCYTES NFR BLD AUTO: 0.3 % (ref 0–0.9)
LYMPHOCYTES # BLD AUTO: 1.22 K/UL (ref 1–4.8)
LYMPHOCYTES NFR BLD: 18.9 % (ref 22–41)
MCH RBC QN AUTO: 30.3 PG (ref 27–33)
MCHC RBC AUTO-ENTMCNC: 33.6 G/DL (ref 33.6–35)
MCV RBC AUTO: 90.4 FL (ref 81.4–97.8)
MONOCYTES # BLD AUTO: 0.46 K/UL (ref 0–0.85)
MONOCYTES NFR BLD AUTO: 7.1 % (ref 0–13.4)
NEUTROPHILS # BLD AUTO: 4.56 K/UL (ref 2–7.15)
NEUTROPHILS NFR BLD: 70.6 % (ref 44–72)
NRBC # BLD AUTO: 0 K/UL
NRBC BLD AUTO-RTO: 0 /100 WBC
PLATELET # BLD AUTO: 278 K/UL (ref 164–446)
PMV BLD AUTO: 10.4 FL (ref 9–12.9)
POTASSIUM SERPL-SCNC: 4.2 MMOL/L (ref 3.6–5.5)
RBC # BLD AUTO: 4.78 M/UL (ref 4.2–5.4)
SODIUM SERPL-SCNC: 137 MMOL/L (ref 135–145)
WBC # BLD AUTO: 6.5 K/UL (ref 4.8–10.8)

## 2017-05-04 PROCEDURE — 85025 COMPLETE CBC W/AUTO DIFF WBC: CPT

## 2017-05-04 PROCEDURE — 99153 MOD SED SAME PHYS/QHP EA: CPT

## 2017-05-04 PROCEDURE — 80048 BASIC METABOLIC PNL TOTAL CA: CPT

## 2017-05-04 PROCEDURE — 37193 REM ENDOVAS VENA CAVA FILTER: CPT

## 2017-05-04 PROCEDURE — 700117 HCHG RX CONTRAST REV CODE 255: Performed by: SURGERY

## 2017-05-04 PROCEDURE — 700111 HCHG RX REV CODE 636 W/ 250 OVERRIDE (IP): Performed by: SURGERY

## 2017-05-04 PROCEDURE — 700111 HCHG RX REV CODE 636 W/ 250 OVERRIDE (IP)

## 2017-05-04 RX ORDER — SODIUM CHLORIDE 9 MG/ML
500 INJECTION, SOLUTION INTRAVENOUS
Status: DISPENSED | OUTPATIENT
Start: 2017-05-04 | End: 2017-05-04

## 2017-05-04 RX ORDER — IODIXANOL 270 MG/ML
10 INJECTION, SOLUTION INTRAVASCULAR ONCE
Status: COMPLETED | OUTPATIENT
Start: 2017-05-04 | End: 2017-05-04

## 2017-05-04 RX ORDER — MIDAZOLAM HYDROCHLORIDE 1 MG/ML
INJECTION INTRAMUSCULAR; INTRAVENOUS
Status: COMPLETED
Start: 2017-05-04 | End: 2017-05-04

## 2017-05-04 RX ORDER — MIDAZOLAM HYDROCHLORIDE 1 MG/ML
.5-2 INJECTION INTRAMUSCULAR; INTRAVENOUS PRN
Status: ACTIVE | OUTPATIENT
Start: 2017-05-04 | End: 2017-05-04

## 2017-05-04 RX ADMIN — MIDAZOLAM 1 MG: 1 INJECTION INTRAMUSCULAR; INTRAVENOUS at 11:33

## 2017-05-04 RX ADMIN — MIDAZOLAM 1 MG: 1 INJECTION INTRAMUSCULAR; INTRAVENOUS at 11:38

## 2017-05-04 RX ADMIN — IODIXANOL 10 ML: 270 INJECTION, SOLUTION INTRAVASCULAR at 12:03

## 2017-05-04 RX ADMIN — MIDAZOLAM 1 MG: 1 INJECTION INTRAMUSCULAR; INTRAVENOUS at 11:48

## 2017-05-04 RX ADMIN — FENTANYL CITRATE 50 MCG: 50 INJECTION, SOLUTION INTRAMUSCULAR; INTRAVENOUS at 11:33

## 2017-05-04 ASSESSMENT — PAIN SCALES - GENERAL
PAINLEVEL_OUTOF10: 0

## 2017-05-04 NOTE — IP AVS SNAPSHOT
5/4/2017    Katy Meghan Chris  [address]  [place] [address]    Dear Katy:    [facility] wants to ensure your discharge home is safe and you or your loved ones have had all of your questions answered regarding your care after you leave the hospital.    Below is a list of resources and contact information should you have any questions regarding your hospital stay, follow-up instructions, or active medical symptoms.    Questions or Concerns Regarding… Contact   Medical Questions Related to Your Discharge  (7 days a week, 8am-5pm) Contact a Nurse Care Coordinator   [phone]   Medical Questions Not Related to Your Discharge  (24 hours a day / 7 days a week)  Contact the Nurse Health Line   [phone]    Medications or Discharge Instructions Refer to your discharge packet   or contact your [facility] Primary Care Provider   [phone]   Follow-up Appointment(s) Schedule your appointment via [employer]   or contact Scheduling [phone]   Billing Review your statement via [employer]  or contact Billing [phone]   Medical Records Review your records via [employer]   or contact Medical Records [phone]     You may receive a telephone call within two days of discharge. This call is to make certain you understand your discharge instructions and have the opportunity to have any questions answered. You can also easily access your medical information, test results and upcoming appointments via the [employer] free online health management tool. You can learn more and sign up at [employer]. For assistance setting up your [employer] account, please call [phone].    Once again, we want to ensure your discharge home is safe and that you have a clear understanding of any next steps in your care. If you have any questions or concerns, please do not hesitate to contact us, we are here for you. Thank you for choosing [facility] for your healthcare needs.    Sincerely,    Your [facility] Healthcare Team

## 2017-05-04 NOTE — DISCHARGE INSTRUCTIONS
ACTIVITY: Rest and take it easy for the first 24 hours.  A responsible adult is recommended to remain with you during that time.  It is normal to feel sleepy.  We encourage you to not do anything that requires balance, judgment or coordination.    MILD FLU-LIKE SYMPTOMS ARE NORMAL. YOU MAY EXPERIENCE GENERALIZED MUSCLE ACHES, THROAT IRRITATION, HEADACHE AND/OR SOME NAUSEA.    FOR 24 HOURS DO NOT:  Drive, operate machinery or run household appliances.  Drink beer or alcoholic beverages.   Make important decisions or sign legal documents.    SPECIAL INSTRUCTIONS: May remove dressing in 24 hours. May shower in 24 hours. Do not submerge in water for 7 days.     DIET: To avoid nausea, slowly advance diet as tolerated, avoiding spicy or greasy foods for the first day.  Add more substantial food to your diet according to your physician's instructions.  Babies can be fed formula or breast milk as soon as they are hungry.  INCREASE FLUIDS AND FIBER TO AVOID CONSTIPATION.    SURGICAL DRESSING/BATHING:     FOLLOW-UP APPOINTMENT:  A follow-up appointment should be arranged with your doctor; call to schedule.    You should CALL YOUR PHYSICIAN if you develop:  Fever greater than 101 degrees F.  Pain not relieved by medication, or persistent nausea or vomiting.  Excessive bleeding (blood soaking through dressing) or unexpected drainage from the wound.  Extreme redness or swelling around the incision site, drainage of pus or foul smelling drainage.  Inability to urinate or empty your bladder within 8 hours.  Problems with breathing or chest pain.    You should call 911 if you develop problems with breathing or chest pain.  If you are unable to contact your doctor or surgical center, you should go to the nearest emergency room or urgent care center.  Physician's telephone #: Dr. Pacheco [phone]      If any questions arise, call your doctor.  If your doctor is not available, please feel free to call the Surgical Center at  [phone]  The Center is open Monday through Friday from 7AM to 7PM.  You can also call the HEALTH HOTLINE open 24 hours/day, 7 days/week and speak to a nurse at [phone], or toll free at [phone].    A registered nurse may call you a few days after your surgery to see how you are doing after your procedure.    MEDICATIONS: Resume taking daily medication.  Take prescribed pain medication with food.  If no medication is prescribed, you may take non-aspirin pain medication if needed.  PAIN MEDICATION CAN BE VERY CONSTIPATING.  Take a stool softener or laxative such as senokot, pericolace, or milk of magnesia if needed.      If your physician has prescribed pain medication that includes Acetaminophen (Tylenol), do not take additional Acetaminophen (Tylenol) while taking the prescribed medication.    Depression / Suicide Risk    As you are discharged from this [facility] facility, it is important to learn how to keep safe from harming yourself.    Recognize the warning signs:  · Abrupt changes in personality, positive or negative- including increase in energy   · Giving away possessions  · Change in eating patterns- significant weight changes-  positive or negative  · Change in sleeping patterns- unable to sleep or sleeping all the time   · Unwillingness or inability to communicate  · Depression  · Unusual sadness, discouragement and loneliness  · Talk of wanting to die  · Neglect of personal appearance   · Rebelliousness- reckless behavior  · Withdrawal from people/activities they love  · Confusion- inability to concentrate     If you or a loved one observes any of these behaviors or has concerns about self-harm, here's what you can do:  · Talk about it- your feelings and reasons for harming yourself  · Remove any means that you might use to hurt yourself (examples: pills, rope, extension cords, firearm)  · Get professional help from the community (Mental Health, Substance Abuse, psychological  counseling)  · Do not be alone:Call your Safe Contact- someone whom you trust who will be there for you.  · Call your local CRISIS HOTLINE 871-1897 or 483-570-3864  · Call your local Children's Mobile Crisis Response Team [place] (902) 496-1267 or www.[employer]  · Call the toll free National Suicide Prevention Hotlines   · National Suicide Prevention Lifeline 408-000-VFNN (4327)  · National Hope Line Network 800-SUICIDE (602-8996)

## 2017-05-04 NOTE — PROGRESS NOTES
Pt underwent am IVC filter remova performed by Dr Pacheco. Pt remained hemodynamically stable throughout the procedure. No adverse reaction noted. Bandage to right neck access site cdi. Report to ZAHIDA Arce. Pt taken back to [facility] in stable condition.

## 2017-05-04 NOTE — IP AVS SNAPSHOT
" Home Care Instructions                                                                                                                Name:Katy Perez  Medical Record Number:[id number]  CSN: [id number]    YOB: 1945   Age: 71 y.o.  Sex: female  HT:1.702 m (5' 7.01\") WT: 82.8 kg (182 lb 8.7 oz)          Admit Date: 5/4/2017     Discharge Date:   Today's Date: 5/4/2017  Attending Doctor:  Nicholas Pacheco M.D.                  Allergies:  Pcn              Follow-up Information     1. Follow up with Nicholas Pacheco M.D. In 1 week.    Specialties:  Surgery, Radiology    Contact information    [address]  [address]  [place] [address] [phone]          Discharge Instructions         ACTIVITY: Rest and take it easy for the first 24 hours.  A responsible adult is recommended to remain with you during that time.  It is normal to feel sleepy.  We encourage you to not do anything that requires balance, judgment or coordination.    MILD FLU-LIKE SYMPTOMS ARE NORMAL. YOU MAY EXPERIENCE GENERALIZED MUSCLE ACHES, THROAT IRRITATION, HEADACHE AND/OR SOME NAUSEA.    FOR 24 HOURS DO NOT:  Drive, operate machinery or run household appliances.  Drink beer or alcoholic beverages.   Make important decisions or sign legal documents.    SPECIAL INSTRUCTIONS: May remove dressing in 24 hours. May shower in 24 hours. Do not submerge in water for 7 days.     DIET: To avoid nausea, slowly advance diet as tolerated, avoiding spicy or greasy foods for the first day.  Add more substantial food to your diet according to your physician's instructions.  Babies can be fed formula or breast milk as soon as they are hungry.  INCREASE FLUIDS AND FIBER TO AVOID CONSTIPATION.    SURGICAL DRESSING/BATHING:     FOLLOW-UP APPOINTMENT:  A follow-up appointment should be arranged with your doctor; call to schedule.    You should CALL YOUR PHYSICIAN if you develop:  Fever greater than 101 degrees F.  Pain not relieved by medication, or " persistent nausea or vomiting.  Excessive bleeding (blood soaking through dressing) or unexpected drainage from the wound.  Extreme redness or swelling around the incision site, drainage of pus or foul smelling drainage.  Inability to urinate or empty your bladder within 8 hours.  Problems with breathing or chest pain.    You should call 911 if you develop problems with breathing or chest pain.  If you are unable to contact your doctor or surgical center, you should go to the nearest emergency room or urgent care center.  Physician's telephone #: Dr. Pacheco [phone]      If any questions arise, call your doctor.  If your doctor is not available, please feel free to call the Surgical Center at [phone]  The Center is open Monday through Friday from 7AM to 7PM.  You can also call the [employer] HOTLINE open 24 hours/day, 7 days/week and speak to a nurse at [phone], or toll free at [phone].    A registered nurse may call you a few days after your surgery to see how you are doing after your procedure.    MEDICATIONS: Resume taking daily medication.  Take prescribed pain medication with food.  If no medication is prescribed, you may take non-aspirin pain medication if needed.  PAIN MEDICATION CAN BE VERY CONSTIPATING.  Take a stool softener or laxative such as senokot, pericolace, or milk of magnesia if needed.      If your physician has prescribed pain medication that includes Acetaminophen (Tylenol), do not take additional Acetaminophen (Tylenol) while taking the prescribed medication.    Depression / Suicide Risk    As you are discharged from this [facility] facility, it is important to learn how to keep safe from harming yourself.    Recognize the warning signs:  · Abrupt changes in personality, positive or negative- including increase in energy   · Giving away possessions  · Change in eating patterns- significant weight changes-  positive or negative  · Change in sleeping patterns- unable to  sleep or sleeping all the time   · Unwillingness or inability to communicate  · Depression  · Unusual sadness, discouragement and loneliness  · Talk of wanting to die  · Neglect of personal appearance   · Rebelliousness- reckless behavior  · Withdrawal from people/activities they love  · Confusion- inability to concentrate     If you or a loved one observes any of these behaviors or has concerns about self-harm, here's what you can do:  · Talk about it- your feelings and reasons for harming yourself  · Remove any means that you might use to hurt yourself (examples: pills, rope, extension cords, firearm)  · Get professional help from the community (Mental Health, Substance Abuse, psychological counseling)  · Do not be alone:Call your Safe Contact- someone whom you trust who will be there for you.  · Call your local CRISIS HOTLINE 286-2349 or 639-002-8858  · Call your local Children's Mobile Crisis Response Team [place] (609) 897-3266 or www.[employer]  · Call the toll free National Suicide Prevention Hotlines   · National Suicide Prevention Lifeline 048-224-AOQX (3398)  · National Hope Line Network 800-SUICIDE (344-4393)       Medication List      ASK your doctor about these medications        Instructions    Morning Afternoon Evening Bedtime    lisinopril 10 MG Tabs   Commonly known as:  PRINIVIL        Take 1 Tab by mouth every day. TAKE ONE TABLET BY MOUTH DAILY   Dose:  10 mg                        rivaroxaban 20 MG Tabs tablet   Commonly known as:  XARELTO        Doctor's comments:  To be taken after the 21st day of using Xarelto 15mg BID.   Take 1 Tab by mouth with dinner.   Dose:  20 mg                        vitamin D3 5000 UNITS Caps        Take 1 Cap by mouth every day.   Dose:  1 Cap                                Medication Information     Above and/or attached are the medications your physician expects you to take upon discharge. Review all of your home medications and newly ordered  medications with your doctor and/or pharmacist. Follow medication instructions as directed by your doctor and/or pharmacist. Please keep your medication list with you and share with your physician. Update the information when medications are discontinued, doses are changed, or new medications (including over-the-counter products) are added; and carry medication information at all times in the event of emergency situations.        Resources     Quit Smoking / Tobacco Use:    I understand the use of any tobacco products increases my chance of suffering from future heart disease or stroke and could cause other illnesses which may shorten my life. Quitting the use of tobacco products is the single most important thing I can do to improve my health. For further information on smoking / tobacco cessation call a Toll Free Quit Line at 1-903.524.8906 (*National [employer] [place]) or 1-438.139.1264 (American Lung Association) or you can access the web based program at www.lungusa.org.    [place] Tobacco Users Help Line:  (778) 124-1722       Toll Free: 1-299.308.1733  Quit Tobacco Program [facility] (044)910-5805    Crisis Hotline:    [place] Crisis Hotline:  9-927-PIFUGAS or 1-593.880.1935    [place] Crisis Hotline:    1-154.327.4035 or 888-842-1477    Discharge Survey:   Thank you for choosing [facility]. We hope we did everything we could to make your hospital stay a pleasant one. You may be receiving a survey and we would appreciate your time and participation in answering the questions. Your input is very valuable to us in our efforts to improve our service to our patients and their families.            Signatures     My signature on this form indicates that:    1. I acknowledge receipt and understanding of these Home Care Instruction.  2. My questions regarding this information have been answered to my satisfaction.  3. I have formulated a plan with my discharge nurse to obtain my prescribed  medications for home.    __________________________________      __________________________________                   Patient Signature                                 Guardian/Responsible Adult Signature      __________________________________                 __________       ________                       Nurse Signature                                               Date                 Time

## 2017-05-04 NOTE — OP REPORT
DATE OF SERVICE:  05/04/2017    DATE OF PROCEDURE:  05/04/2017    PREPROCEDURE DIAGNOSES:  1.  History of deep vein thrombosis.  2.  Inferior vena cava filter in place.    POSTPROCEDURE DIAGNOSES:   1.  History of deep vein thrombosis.  2.  Inferior vena cava filter in place.    PROCEDURES PERFORMED:  1.  Ultrasound-guided access, right internal jugular vein.  2.  Catheter placement inferior vena cava.  3.  Venacavogram.  4.  Retrieval of inferior vena cava temporary filter.    SURGEON:  Nicholas Pacheco MD    FIRST ASSISTANT:  None.    ANESTHESIA:  Sedation.    PROCEDURE IN DETAIL:  Patient was taken to the interventional suite, placed in   the supine position.  After adequate sedation, the patient's right neck was   prepped and draped in sterile fashion.  Using ultrasound guidance, a thin-wall   access needle was inserted into the right internal jugular vein and a   6-Niuean sheath was placed.  A Glidewire was then advanced into the infrarenal   vena cava and a wire exchange took place over catheter to a stiff wire.  A   10-Niuean sheath was then placed after dilating the vein and a recovery cone   catheter was advanced down over the proximal portion of the IVC filter.  Prior   to doing that, a venacavogram was performed, which demonstrated no thrombus   within the filter.  A recovery cone was then used to capture the filter, it   was then re-sheathed without difficulty and extracted.  Pressure was held to   site.  Patient tolerated the procedure well.       ____________________________________     MD PRIYA LEIGH / LOUIS    DD:  05/04/2017 11:53:56  DT:  05/04/2017 13:00:10    D#:  [id number]  Job#:  [id number]

## 2017-05-10 ENCOUNTER — TELEPHONE (OUTPATIENT)
Dept: MEDICAL GROUP | Facility: MEDICAL CENTER | Age: 72
End: 2017-05-10

## 2017-05-10 NOTE — TELEPHONE ENCOUNTER
Future Appointments       Provider Department Center    5/18/2017 1:40 PM Nicholas Darnell M.D. [facility] [address]        ESTABLISHED PATIENT PRE-VISIT PLANNING     Note: Patient will not be contacted if there is no indication to call.     1.  Reviewed note from last office visit with PCP and/or other med group provider: Yes    2.  If any orders were placed at last visit, do we have Results/Consult Notes?        •  Labs - Labs were not ordered at last office visit.       •  Imaging - Imaging was not ordered at last office visit.       •  Referrals - Referral ordered, patient was seen and consult notes are in chart. Care Teams updated  YES.    3.  Immunizations were updated in [facility] using [employer]?: Yes       •  [employer] Recommendations: HEPATITIS A  HEPATITIS B MMR  TD ZOSTAVAX (Shingles)    4.  Patient is due for the following Health Maintenance Topics:   Health Maintenance Due   Topic Date Due   • IMM ZOSTER VACCINE  05/11/2007   • Annual Wellness Visit  05/03/2017           5.  Patient was not informed to arrive 15 min prior to their scheduled appointment and bring in their medication bottles.

## 2017-05-15 ENCOUNTER — TELEPHONE (OUTPATIENT)
Dept: VASCULAR LAB | Facility: MEDICAL CENTER | Age: 72
End: 2017-05-15

## 2017-05-15 RX ORDER — LISINOPRIL 20 MG/1
20 TABLET ORAL DAILY
Qty: 90 TAB | Refills: 3 | Status: SHIPPED | OUTPATIENT
Start: 2017-05-15 | End: 2018-05-14 | Stop reason: SDUPTHER

## 2017-05-18 ENCOUNTER — OFFICE VISIT (OUTPATIENT)
Dept: MEDICAL GROUP | Facility: MEDICAL CENTER | Age: 72
End: 2017-05-18
Payer: MEDICARE

## 2017-05-18 VITALS
HEART RATE: 63 BPM | TEMPERATURE: 97.4 F | DIASTOLIC BLOOD PRESSURE: 80 MMHG | BODY MASS INDEX: 29.57 KG/M2 | WEIGHT: 184 LBS | RESPIRATION RATE: 16 BRPM | SYSTOLIC BLOOD PRESSURE: 122 MMHG | HEIGHT: 66 IN | OXYGEN SATURATION: 97 %

## 2017-05-18 DIAGNOSIS — I26.99 OTHER PULMONARY EMBOLISM WITHOUT ACUTE COR PULMONALE, UNSPECIFIED CHRONICITY (HCC): ICD-10-CM

## 2017-05-18 DIAGNOSIS — I10 ESSENTIAL HYPERTENSION: ICD-10-CM

## 2017-05-18 DIAGNOSIS — Z00.00 MEDICARE ANNUAL WELLNESS VISIT, SUBSEQUENT: ICD-10-CM

## 2017-05-18 PROBLEM — Z95.828 S/P INSERTION OF IVC (INFERIOR VENA CAVAL) FILTER: Status: RESOLVED | Noted: 2017-02-04 | Resolved: 2017-05-18

## 2017-05-18 PROBLEM — I82.409 DEEP VEIN THROMBOSIS (HCC): Status: RESOLVED | Noted: 2017-03-15 | Resolved: 2017-05-18

## 2017-05-18 PROCEDURE — G8419 CALC BMI OUT NRM PARAM NOF/U: HCPCS | Performed by: INTERNAL MEDICINE

## 2017-05-18 PROCEDURE — 3014F SCREEN MAMMO DOC REV: CPT | Mod: 8P | Performed by: INTERNAL MEDICINE

## 2017-05-18 PROCEDURE — G0439 PPPS, SUBSEQ VISIT: HCPCS | Mod: 25 | Performed by: INTERNAL MEDICINE

## 2017-05-18 PROCEDURE — 99213 OFFICE O/P EST LOW 20 MIN: CPT | Mod: 25 | Performed by: INTERNAL MEDICINE

## 2017-05-18 PROCEDURE — 1101F PT FALLS ASSESS-DOCD LE1/YR: CPT | Performed by: INTERNAL MEDICINE

## 2017-05-18 PROCEDURE — 4040F PNEUMOC VAC/ADMIN/RCVD: CPT | Performed by: INTERNAL MEDICINE

## 2017-05-18 PROCEDURE — 1036F TOBACCO NON-USER: CPT | Performed by: INTERNAL MEDICINE

## 2017-05-18 PROCEDURE — G8510 SCR DEP NEG, NO PLAN REQD: HCPCS | Performed by: INTERNAL MEDICINE

## 2017-05-18 ASSESSMENT — PATIENT HEALTH QUESTIONNAIRE - PHQ9: CLINICAL INTERPRETATION OF PHQ2 SCORE: 0

## 2017-05-18 NOTE — MR AVS SNAPSHOT
"        Katy Perez   2017 1:40 PM   Office Visit   MRN: [id number]    Department:  [address] [place] [address]   Dept Phone:  [phone]    Description:  Female : 1945   Provider:  Nicholsa Darnell M.D.           Reason for Visit     Annual Exam Labs      Allergies as of 2017     Allergen Noted Reactions    Pcn [Penicillins] 2012   Rash      You were diagnosed with     Medicare annual wellness visit, subsequent   [320099]       Essential hypertension   [2851084]       Other pulmonary embolism without acute cor pulmonale, unspecified chronicity (CMS-HCC)   [7139321]         Vital Signs     Blood Pressure Pulse Temperature Respirations Height Weight    122/80 mmHg 63 36.3 °C (97.4 °F) 16 1.676 m (5' 6\") 83.462 kg (184 lb)    Body Mass Index Oxygen Saturation Smoking Status             29.71 kg/m2 97% Never Smoker          Basic Information     Date Of Birth Sex Race Ethnicity Preferred Language    1945 Female White Non- English      Your appointments     Aug 18, 2017  1:20 PM   Established Patient with Nicholas Darnell M.D.   [facility] [address]    [address]  [address]  [facility] [id number] [phone]           You will be receiving a confirmation call a few days before your appointment from our automated call confirmation system.              Problem List              ICD-10-CM Priority Class Noted - Resolved    Arthritis (Chronic) M19.90   2012 - Present    Osteoarthrosis involving lower leg M17.10   2013 - Present    Essential hypertension I10   2016 - Present    Bunion of great toe of right foot M21.611   1/10/2017 - Present    Pulmonary emboli (CMS-HCC) I26.99   2017 - Present    Left leg DVT (CMS-HCC) I82.402   2/3/2017 - Present      Health Maintenance        Date Due Completion Dates    IMM ZOSTER VACCINE 2007 ---    MAMMOGRAM 2017 (Originally 2014) 2013 (Done), 2012    Override on 2013: Done   " BONE DENSITY 3/3/2020 3/3/2015, 6/1/2012    COLONOSCOPY 6/11/2022 6/11/2012, 6/5/2012 (Done)    Override on 6/5/2012: Done    IMM DTaP/Tdap/Td Vaccine (3 - Td) 8/19/2023 8/19/2013, 5/16/2007            Current Immunizations     13-VALENT PCV PREVNAR 2/3/2017  4:33 AM    Influenza TIV (IM) 10/16/2013  2:08 PM, 10/16/2011    Influenza Vaccine Adult HD 10/15/2016    Influenza Vaccine Quad Inj (Preserved) 10/30/2015, 10/17/2014    Pneumococcal polysaccharide vaccine (PPSV-23) 5/16/2012    Tdap Vaccine 8/19/2013  2:24 PM, 5/16/2007      Below and/or attached are the medications your provider expects you to take. Review all of your home medications and newly ordered medications with your provider and/or pharmacist. Follow medication instructions as directed by your provider and/or pharmacist. Please keep your medication list with you and share with your provider. Update the information when medications are discontinued, doses are changed, or new medications (including over-the-counter products) are added; and carry medication information at all times in the event of emergency situations     Allergies:  PCN - Rash               Medications  Valid as of: May 18, 2017 -  2:19 PM    Generic Name Brand Name Tablet Size Instructions for use    Cholecalciferol (Cap) vitamin D3 5000 UNITS Take 1 Cap by mouth every day.        Lisinopril (Tab) PRINIVIL 20 MG Take 1 Tab by mouth every day.        Rivaroxaban (Tab) XARELTO 20 MG Take 1 Tab by mouth with dinner.        .                 Medicines prescribed today were sent to:     [facility] #[id number] - [place] - [address]    [address] [place] [address]    Phone: [phone] Fax: [phone]    Open 24 Hours?: No    [facility] #[id number] - [place] - [address]    [address] [place] [address]    Phone: [phone] Fax: [phone]    Open 24 Hours?: No      Medication refill instructions:       If your prescription bottle indicates you have medication refills left, it is  not necessary to call your provider’s office. Please contact your pharmacy and they will refill your medication.    If your prescription bottle indicates you do not have any refills left, you may request refills at any time through one of the following ways: The online [employer] system (except Urgent Care), by calling your provider’s office, or by asking your pharmacy to contact your provider’s office with a refill request. Medication refills are processed only during regular business hours and may not be available until the next business day. Your provider may request additional information or to have a follow-up visit with you prior to refilling your medication.   *Please Note: Medication refills are assigned a new Rx number when refilled electronically. Your pharmacy may indicate that no refills were authorized even though a new prescription for the same medication is available at the pharmacy. Please request the medicine by name with the pharmacy before contacting your provider for a refill.        Other Notes About Your Plan     Patient enrolled in [facility] with Dr. Nicholas Darnell.           Over 40 Femalest Access Code: Activation code not generated  Current [employer] Status: Active

## 2017-05-18 NOTE — PROGRESS NOTES
CC: Follow-up pulmonary embolism due for wellness examination.    HPI:   Katy presents today with the following.    1. Medicare annual wellness visit, subsequent  Screens performed below advanced records on file.    2. Essential hypertension  Blood pressure well controlled denying any chest pain or shortness breath no edema.    3. Other pulmonary embolism without acute cor pulmonale, unspecified chronicity (CMS-HCC)  Patient status post DVT with subsequent pulmonary embolism post bunion surgery. Because of a unstable clot she did have an IVC filter placed. Filter was removed last week and she is maintained on Xarelto. She denies any blood in her stool or dark tarry stool. Overall she's doing well with no complaints.      Depression Screening    Little interest or pleasure in doing things?  0 - not at all  Feeling down, depressed , or hopeless? 0 - not at all  Trouble falling or staying asleep, or sleeping too much?     Feeling tired or having little energy?     Poor appetite or overeating?     Feeling bad about yourself - or that you are a failure or have let yourself or your family down?    Trouble concentrating on things, such as reading the newspaper or watching television?    Moving or speaking so slowly that other people could have noticed.  Or the opposite - being so fidgety or restless that you have been moving around a lot more than usual?     Thoughts that you would be better off dead, or of hurting yourself?     Patient Health Questionnaire Score:    If depressive symptoms identified deferred to follow up visit unless specifically addressed in assessment and plan.    Interpretation of PHQ-9 Total Score   Score Severity   1-4 Minimal Depression   5-9 Mild Depression   10-14 Moderate Depression   15-19 Moderately Severe Depression   20-27 Severe Depression    Screening for Cognitive Impairment    Three Minute Recall (banana, sunrise, fence)  3/3    Draw clock face with all 12 numbers set to the hand to show  10 minures past 11 o'clock  1 5/5  If cognitive concerns identified deferred to follow up visit unless specifically addressed in assessment and plan.    Fall Risk Assessment    Has the patient had two or more falls in the last year or any fall with injury in the last year?  No  If Fall Risk identified deferred to follow up visit unless specifically addressed in assessment and plan.    Safety Assessment    Throw rugs on floor.  No  Handrails on all stairs.  Yes  Good lighting in all hallways.  Yes  Difficulty hearing.  No  Patient counseled about all safety risks that were identified.    Functional Assessment ADLs    Are there any barriers preventing you from cooking for yourself or meeting nutritional needs?  No.    Are there any barriers preventing you from driving safely or obtaining transportation?  No.    Are there any barriers preventing you from using a telephone or calling for help?  No.    Are there any barriers preventing you from shopping?  No.    Are there any barriers preventing you from taking care of your own finances?  No.    Are there any barriers preventing you from managing your medications?  No.    Are currently engaing any exercise or physical activity?  Yes.       Health Maintenance Summary                IMM ZOSTER VACCINE Overdue 5/11/2007     Annual Wellness Visit Overdue 5/3/2017      Done 5/2/2016 Visit Dx: Medicare annual wellness visit, subsequent     Patient has more history with this topic...    MAMMOGRAM Postponed 12/16/2017 Originally 4/11/2014. Patient Refused     Done 4/11/2013      Patient has more history with this topic...    BONE DENSITY Next Due 3/3/2020      Done 3/3/2015 DS-BONE DENSITY STUDY (DEXA)     Patient has more history with this topic...    COLONOSCOPY Next Due 6/11/2022      Done 6/11/2012 REFERRAL TO GI FOR COLONOSCOPY     Patient has more history with this topic...    IMM DTaP/Tdap/Td Vaccine Next Due 8/19/2023      Done 8/19/2013 Imm Admin: Tdap Vaccine      "Patient has more history with this topic...          Patient Care Team:  Nicholas Darnell M.D. as PCP - General (Internal Medicine)  Jose Alexandra O.D as Consulting Physician (Optometry)  Ten Burton D.P.M. as Consulting Physician (Podiatry)  RAFI Pittman as Consulting Physician (Cardiology)      Patient Active Problem List    Diagnosis Date Noted   • Left leg DVT (CMS-HCC) 02/03/2017   • Pulmonary emboli (CMS-HCC) 02/02/2017   • Bunion of great toe of right foot 01/10/2017   • Essential hypertension 05/02/2016   • Osteoarthrosis involving lower leg 11/05/2013   • Arthritis 05/16/2012       Current Outpatient Prescriptions   Medication Sig Dispense Refill   • rivaroxaban (XARELTO) 20 MG Tab tablet Take 1 Tab by mouth with dinner. 30 Tab 3   • lisinopril (PRINIVIL) 20 MG Tab Take 1 Tab by mouth every day. 90 Tab 3   • Cholecalciferol (VITAMIN D3) 5000 UNITS CAPS Take 1 Cap by mouth every day.       No current facility-administered medications for this visit.         Allergies as of 05/18/2017 - Yonatan as Reviewed 05/18/2017   Allergen Reaction Noted   • Pcn [penicillins] Rash 05/16/2012        ROS: As per HPI.    /80 mmHg  Pulse 63  Temp(Src) 36.3 °C (97.4 °F)  Resp 16  Ht 1.676 m (5' 6\")  Wt 83.462 kg (184 lb)  BMI 29.71 kg/m2  SpO2 97%    Physical Exam:  Gen:         Alert and oriented, No apparent distress.  Neck:        No Lymphadenopathy or Bruits.  Lungs:     Clear to auscultation bilaterally  CV:          Regular rate and rhythm. No murmurs, rubs or gallops.  Abd:         Soft non tender, non distended. Normal active bowel sounds.  No  Hepatosplenomegaly, No pulsatile masses.                   Ext:          No clubbing, cyanosis, edema.      Assessment and Plan.   72 y.o. female with the following issues.    1. Medicare annual wellness visit, subsequent  Discussed healthy lifestyle habits as well as screening regimens.  - Annual Wellness Visit - Includes PPPS Subsequent " ()    2. Essential hypertension  Currently well controlled, Discuss diet, exercise and salt restriction.  - Annual Wellness Visit - Includes PPPS Subsequent ()    3. Other pulmonary embolism without acute cor pulmonale, unspecified chronicity (CMS-HCC)  Initial plan will be for at least 6 months of therapy however have contacted anticoagulation clinic for recommendations for duration if different in 6 months. We'll plan on seeing the patient back in August.

## 2017-07-21 ENCOUNTER — TELEPHONE (OUTPATIENT)
Dept: VASCULAR LAB | Facility: MEDICAL CENTER | Age: 72
End: 2017-07-21

## 2017-07-21 NOTE — TELEPHONE ENCOUNTER
Left message for patient to call back and schedule appt with [facility] for evaluation of IVC Filter

## 2017-08-15 ENCOUNTER — TELEPHONE (OUTPATIENT)
Dept: MEDICAL GROUP | Facility: MEDICAL CENTER | Age: 72
End: 2017-08-15

## 2017-08-15 NOTE — TELEPHONE ENCOUNTER
Future Appointments       Provider Department Center    8/18/2017 1:20 PM Nicholas Darnell M.D. [facility] [address]    10/3/2017 1:40 PM [facility] EXAM 1; VASCULAR NURSE PRACTITIONER [facility]          ESTABLISHED PATIENT PRE-VISIT PLANNING     Note: Patient will not be contacted if there is no indication to call.     1.  Reviewed note from last office visit with PCP and/or other med group provider: Yes    2.  If any orders were placed at last visit, do we have Results/Consult Notes?        •  Labs - Labs were not ordered at last office visit.       •  Imaging - Imaging was not ordered at last office visit.       •  Referrals - No referrals were ordered at last office visit.    3.  Immunizations were updated in Epic using WebIZ?: Yes       •  Web Iz Recommendations: ZOSTAVAX (Shingles)    4.  Patient is due for the following Health Maintenance Topics:   Health Maintenance Due   Topic Date Due   • IMM ZOSTER VACCINE  05/11/2007           5.  Patient was not informed to arrive 15 min prior to their scheduled appointment and bring in their medication bottles.

## 2017-08-18 ENCOUNTER — OFFICE VISIT (OUTPATIENT)
Dept: MEDICAL GROUP | Facility: MEDICAL CENTER | Age: 72
End: 2017-08-18
Payer: MEDICARE

## 2017-08-18 VITALS
OXYGEN SATURATION: 94 % | WEIGHT: 183.2 LBS | SYSTOLIC BLOOD PRESSURE: 106 MMHG | HEART RATE: 57 BPM | DIASTOLIC BLOOD PRESSURE: 58 MMHG | TEMPERATURE: 96.7 F | BODY MASS INDEX: 29.44 KG/M2 | HEIGHT: 66 IN | RESPIRATION RATE: 16 BRPM

## 2017-08-18 DIAGNOSIS — I26.99 OTHER PULMONARY EMBOLISM WITHOUT ACUTE COR PULMONALE, UNSPECIFIED CHRONICITY (HCC): ICD-10-CM

## 2017-08-18 PROCEDURE — 99213 OFFICE O/P EST LOW 20 MIN: CPT | Performed by: INTERNAL MEDICINE

## 2017-08-18 NOTE — MR AVS SNAPSHOT
"Katy Perez   2017 1:20 PM   Office Visit   MRN: [id number]    Department:  [address] [place] [address]   Dept Phone:  [phone]    Description:  Female : 1945   Provider:  Nicholas Darnell M.D.           Reason for Visit     Follow-Up discuss stopping xarelto      Allergies as of 2017     Allergen Noted Reactions    Pcn [Penicillins] 2012   Rash      You were diagnosed with     Other pulmonary embolism without acute cor pulmonale, unspecified chronicity (CMS-HCC)   [[id number]]         Vital Signs     Blood Pressure Pulse Temperature Respirations Height Weight    106/58 mmHg 57 35.9 °C (96.7 °F) 16 1.676 m (5' 5.98\") 83.099 kg (183 lb 3.2 oz)    Body Mass Index Oxygen Saturation Smoking Status             29.58 kg/m2 94% Never Smoker          Basic Information     Date Of Birth Sex Race Ethnicity Preferred Language    1945 Female White Non- English      Your appointments     Oct 03, 2017  1:40 PM   Initial Visit with VASCULAR NURSE PRACTITIONER, [facility] EXAM 1   [facility]  (--)    [address] [place] [id number]   [phone]              Problem List              ICD-10-CM Priority Class Noted - Resolved    Arthritis (Chronic) M19.90   2012 - Present    Osteoarthrosis involving lower leg M17.10   2013 - Present    Essential hypertension I10   2016 - Present    Bunion of great toe of right foot M21.611   1/10/2017 - Present    Pulmonary emboli (CMS-HCC) I26.99   2017 - Present    Left leg DVT (CMS-HCC) I82.402   2/3/2017 - Present      Health Maintenance        Date Due Completion Dates    IMM ZOSTER VACCINE 2007 ---    MAMMOGRAM 2017 (Originally 2014) 2013 (Done), 2012    Override on 2013: Done    IMM INFLUENZA (1) 2017 10/15/2016, 10/30/2015, 10/17/2014, 10/16/2013, 10/16/2011    BONE DENSITY 3/3/2020 3/3/2015, 2012    COLONOSCOPY 2022, " 6/5/2012 (Done)    Override on 6/5/2012: Done    IMM DTaP/Tdap/Td Vaccine (3 - Td) 8/19/2023 8/19/2013, 5/16/2007            Current Immunizations     13-VALENT PCV PREVNAR 2/3/2017  4:33 AM    Influenza TIV (IM) 10/16/2013  2:08 PM, 10/16/2011    Influenza Vaccine Adult HD 10/15/2016    Influenza Vaccine Quad Inj (Preserved) 10/30/2015, 10/17/2014    Pneumococcal polysaccharide vaccine (PPSV-23) 5/16/2012    Tdap Vaccine 8/19/2013  2:24 PM, 5/16/2007      Below and/or attached are the medications your provider expects you to take. Review all of your home medications and newly ordered medications with your provider and/or pharmacist. Follow medication instructions as directed by your provider and/or pharmacist. Please keep your medication list with you and share with your provider. Update the information when medications are discontinued, doses are changed, or new medications (including over-the-counter products) are added; and carry medication information at all times in the event of emergency situations     Allergies:  PCN - Rash               Medications  Valid as of: August 18, 2017 -  1:37 PM    Generic Name Brand Name Tablet Size Instructions for use    Cholecalciferol (Cap) vitamin D3 5000 units Take 1 Cap by mouth every day.        Lisinopril (Tab) PRINIVIL 20 MG Take 1 Tab by mouth every day.        Rivaroxaban (Tab) XARELTO 20 MG Take 1 Tab by mouth with dinner.        .                 Medicines prescribed today were sent to:     [id number] - [place] - [address]    [address] [place] [address]    Phone: [phone] Fax: [phone]    Open 24 Hours?: No    [id number] - [place] - [address]    [address] [place] [address]    Phone: [phone] Fax: [phone]    Open 24 Hours?: No      Medication refill instructions:       If your prescription bottle indicates you have medication refills left, it is not necessary to call your provider’s office. Please contact your pharmacy  and they will refill your medication.    If your prescription bottle indicates you do not have any refills left, you may request refills at any time through one of the following ways: The online [employer] system (except Urgent Care), by calling your provider’s office, or by asking your pharmacy to contact your provider’s office with a refill request. Medication refills are processed only during regular business hours and may not be available until the next business day. Your provider may request additional information or to have a follow-up visit with you prior to refilling your medication.   *Please Note: Medication refills are assigned a new Rx number when refilled electronically. Your pharmacy may indicate that no refills were authorized even though a new prescription for the same medication is available at the pharmacy. Please request the medicine by name with the pharmacy before contacting your provider for a refill.        Other Notes About Your Plan     Patient enrolled in [facility] with Dr. Nicholas Darnell.           [employer] Access Code: Activation code not generated  Current [employer] Status: Active

## 2017-08-18 NOTE — PROGRESS NOTES
"CC: Follow-up pulmonary embolism.    HPI:   Katy presents today with the following.    1. Other pulmonary embolism without acute cor pulmonale, unspecified chronicity (CMS-[facility])  Patient maintain on blood thinners denying any sources of bleeding. She reports her respiratory capacity and ability exercises back to what was baseline before the event. Her foot is doing excellent in terms of surgery and she denies any other complaints today.      Patient Active Problem List    Diagnosis Date Noted   • Left leg DVT (CMS-[facility]) 02/03/2017   • Pulmonary emboli (CMS-[facility]) 02/02/2017   • Bunion of great toe of right foot 01/10/2017   • Essential hypertension 05/02/2016   • Osteoarthrosis involving lower leg 11/05/2013   • Arthritis 05/16/2012       Current Outpatient Prescriptions   Medication Sig Dispense Refill   • rivaroxaban (XARELTO) 20 MG Tab tablet Take 1 Tab by mouth with dinner. 30 Tab 3   • lisinopril (PRINIVIL) 20 MG Tab Take 1 Tab by mouth every day. 90 Tab 3   • Cholecalciferol (VITAMIN D3) 5000 UNITS CAPS Take 1 Cap by mouth every day.       No current facility-administered medications for this visit.         Allergies as of 08/18/2017 - Yonatan as Reviewed 08/18/2017   Allergen Reaction Noted   • Pcn [penicillins] Rash 05/16/2012        ROS: As per HPI.    /58 mmHg  Pulse 57  Temp(Src) 35.9 °C (96.7 °F)  Resp 16  Ht 1.676 m (5' 5.98\")  Wt 83.099 kg (183 lb 3.2 oz)  BMI 29.58 kg/m2  SpO2 94%    Physical Exam:  Gen:         Alert and oriented, No apparent distress.  Neck:        No Lymphadenopathy or Bruits.  Lungs:     Clear to auscultation bilaterally  CV:          Regular rate and rhythm. No murmurs, rubs or gallops.               Ext:          No clubbing, cyanosis, edema.      Assessment and Plan.   72 y.o. female with the following issues.    1. Other pulmonary embolism without acute cor pulmonale, unspecified chronicity (CMS-[facility])  She will follow-up with the vascular clinic in October with likely " discontinuation of Coumadin. She will follow-up if needed.

## 2017-10-03 ENCOUNTER — OFFICE VISIT (OUTPATIENT)
Dept: VASCULAR LAB | Facility: MEDICAL CENTER | Age: 72
End: 2017-10-03
Attending: NURSE PRACTITIONER
Payer: MEDICARE

## 2017-10-03 VITALS
BODY MASS INDEX: 29.99 KG/M2 | HEIGHT: 65 IN | WEIGHT: 180 LBS | HEART RATE: 60 BPM | DIASTOLIC BLOOD PRESSURE: 74 MMHG | SYSTOLIC BLOOD PRESSURE: 141 MMHG

## 2017-10-03 DIAGNOSIS — I82.4Y2 DEEP VEIN THROMBOSIS (DVT) OF PROXIMAL VEIN OF LEFT LOWER EXTREMITY, UNSPECIFIED CHRONICITY (HCC): ICD-10-CM

## 2017-10-03 DIAGNOSIS — I26.99 OTHER PULMONARY EMBOLISM WITHOUT ACUTE COR PULMONALE, UNSPECIFIED CHRONICITY (HCC): ICD-10-CM

## 2017-10-03 PROCEDURE — 99214 OFFICE O/P EST MOD 30 MIN: CPT | Performed by: NURSE PRACTITIONER

## 2017-10-03 PROCEDURE — 99212 OFFICE O/P EST SF 10 MIN: CPT | Performed by: NURSE PRACTITIONER

## 2017-10-03 ASSESSMENT — ENCOUNTER SYMPTOMS
FEVER: 0
SHORTNESS OF BREATH: 0
WHEEZING: 0
CLAUDICATION: 0
BRUISES/BLEEDS EASILY: 0
PALPITATIONS: 0
WEIGHT LOSS: 0
DOUBLE VISION: 0
BLURRED VISION: 0
DIZZINESS: 0
BLOOD IN STOOL: 0
MYALGIAS: 0
FALLS: 0
HEARTBURN: 0
SPUTUM PRODUCTION: 0
COUGH: 0
HEADACHES: 0
NERVOUS/ANXIOUS: 0

## 2017-10-03 NOTE — PROGRESS NOTES
"VASCULAR ANTI-COAGULATION VISIT  Subjective:   Katy Perez is a 72 y.o. female who presents today 10/3/2017 for   Chief Complaint   Patient presents with   • Follow-Up       HPI: Pt here today for evaluation of length of therapy on anticoagulation.  Pt states in February 2017 she had a bunionectomy surgery on her right foot and was non-weight bearing for 3 weeks.  During that period of time she was fairly immobile.  She states she noticed pain, redness, and swelling, but did not present to the ER until she had SOB x 6 hrs.  She was found to be positive for a DVT with a \"free floating tail\" and a PE.  An IVC filter was placed due to the risk of the thrombosis detaching.  Her IVC filter was removed in May by Dr. Pacheco without any complications.  She has no personal or family hx of clotting.  Denies any CA; she is up to date on all CA screenings. She is a non-smoker and does not use any type of HRT.  She requests a follow up ultrasound of her RLE today.     PMH: HTN, arthritis  PSH:  Tubal ligation, hammertoe correction, bunionectomy, total knee arthroplasty    Social History   Substance Use Topics   • Smoking status: Never Smoker   • Smokeless tobacco: Never Used   • Alcohol use 4.2 oz/week     7 Glasses of wine per week      Comment: \"a couple glasses of wine a day\"     DIET AND EXERCISE:  Weight Change: down 3 lbs  Diet: common adult  Exercise: strenuous regular exercise program     Review of Systems   Constitutional: Negative for fever, malaise/fatigue and weight loss.   HENT: Negative for hearing loss.    Eyes: Negative for blurred vision and double vision.   Respiratory: Negative for cough, sputum production, shortness of breath and wheezing.    Cardiovascular: Negative for chest pain, palpitations, claudication and leg swelling.   Gastrointestinal: Negative for blood in stool and heartburn.   Genitourinary: Negative for hematuria.   Musculoskeletal: Negative for falls and myalgias.   Skin: Negative " "for rash.   Neurological: Negative for dizziness and headaches.   Endo/Heme/Allergies: Does not bruise/bleed easily.   Psychiatric/Behavioral: The patient is not nervous/anxious.       Objective:     Vitals:    10/03/17 1358   BP: 141/74   Pulse: 60   Weight: 81.6 kg (180 lb)   Height: 1.651 m (5' 5\")     Body mass index is 29.95 kg/m².  Physical Exam   Constitutional: She is oriented to person, place, and time. She appears well-developed and well-nourished. No distress.   HENT:   Head: Normocephalic.   Eyes: Pupils are equal, round, and reactive to light.   Cardiovascular: Normal rate, regular rhythm, normal heart sounds and intact distal pulses.  Exam reveals no gallop and no friction rub.    No murmur heard.  Pulmonary/Chest: Effort normal and breath sounds normal. No respiratory distress. She has no wheezes. She has no rales. She exhibits no tenderness.   Musculoskeletal: Normal range of motion. She exhibits no edema, tenderness or deformity.   Neurological: She is alert and oriented to person, place, and time. Coordination normal.   Skin: Skin is warm and dry. She is not diaphoretic.   Psychiatric: She has a normal mood and affect.         Lab Results   Component Value Date    INR 1.2 04/26/2017         Lab Results   Component Value Date    SODIUM 137 05/04/2017    POTASSIUM 4.2 05/04/2017    CHLORIDE 107 05/04/2017    CO2 22 05/04/2017    GLUCOSE 105 (H) 05/04/2017    BUN 20 05/04/2017    CREATININE 0.95 05/04/2017        Lab Results   Component Value Date    WBC 6.5 05/04/2017    RBC 4.78 05/04/2017    HEMOGLOBIN 14.5 05/04/2017    HEMATOCRIT 43.2 05/04/2017    MCV 90.4 05/04/2017    MCH 30.3 05/04/2017    MCHC 33.6 05/04/2017    MPV 10.4 05/04/2017      Medical Decision Making:  Today's Assessment / Status / Plan:     1. Deep vein thrombosis (DVT) of proximal vein of left lower extremity, unspecified chronicity (CMS-HCC)  LE Venous Duplex   2. Other pulmonary embolism without acute cor pulmonale, unspecified " chronicity (CMS-HCC)       Indication for anticoagulation: Provoked DVT/PE following bunionectomy    Anti-Platelet/Anti-Coagulant Tx: yes    Anti-Coagulation Plan:  Pt has completed greater than 6 months of anticoagulation on Xarelto without any complications.  Discussed the risks and benefits of extended anticoagulation in this setting.  Her initial thrombus was relatively extensive with a free floating tail.  Based on these results she would like to re-scan her RLE before stopping anticoagulation.  I am in agreement with this decision.  Discussed if the scan shows resolved or chronic clot, I recommend stopping anticoagulation, starting ASA with close monitoring of recurrent thrombosis.  Pt states understanding.  - Continue anticoagulation with Xarelto pending results of repeat duplex.  - Will call with results of scan.    Smoking: Continue complete cessation      Physical Activity: frequency : goal is  3-4 times a week    Weight Management and Nutrition:exercise counseling and nutrition counseling    Instructed to follow-up with PCP for remainder of adult medical needs: yes  We will partner with other provider in the management of established vascular disease and cardiometabolic risk factors    Studies to Be Obtained: LE duplex  Labs to Be Obtained: None    Follow up in: As needed    FREDA Pittman.    CC:   Nicholas Darnell M.D.

## 2017-10-09 ENCOUNTER — HOSPITAL ENCOUNTER (OUTPATIENT)
Dept: RADIOLOGY | Facility: MEDICAL CENTER | Age: 72
End: 2017-10-09
Attending: NURSE PRACTITIONER
Payer: MEDICARE

## 2017-10-09 DIAGNOSIS — I82.4Y2 DEEP VEIN THROMBOSIS (DVT) OF PROXIMAL VEIN OF LEFT LOWER EXTREMITY, UNSPECIFIED CHRONICITY (HCC): ICD-10-CM

## 2017-10-09 PROCEDURE — 93971 EXTREMITY STUDY: CPT | Performed by: SURGERY

## 2017-10-09 PROCEDURE — 93971 EXTREMITY STUDY: CPT

## 2017-12-08 ENCOUNTER — PATIENT OUTREACH (OUTPATIENT)
Dept: HEALTH INFORMATION MANAGEMENT | Facility: OTHER | Age: 72
End: 2017-12-08

## 2017-12-08 NOTE — PROGRESS NOTES
Outcome: Left Message    Please transfer to Patient Outreach Team at [phone] when patient returns call.    WebIZ Checked & Epic Updated:  yes    HealthConnect Verified: yes    Attempt # 1

## 2018-01-31 ENCOUNTER — HOSPITAL ENCOUNTER (OUTPATIENT)
Facility: MEDICAL CENTER | Age: 73
End: 2018-01-31
Attending: INTERNAL MEDICINE
Payer: MEDICARE

## 2018-01-31 DIAGNOSIS — R30.0 DYSURIA: ICD-10-CM

## 2018-01-31 LAB
APPEARANCE UR: ABNORMAL
BACTERIA #/AREA URNS HPF: NEGATIVE /HPF
BILIRUB UR QL STRIP.AUTO: NEGATIVE
CAOX CRY #/AREA URNS HPF: ABNORMAL /HPF
COLOR UR: YELLOW
CULTURE IF INDICATED INDCX: YES UA CULTURE
EPI CELLS #/AREA URNS HPF: ABNORMAL /HPF
GLUCOSE UR STRIP.AUTO-MCNC: NEGATIVE MG/DL
HYALINE CASTS #/AREA URNS LPF: ABNORMAL /LPF
KETONES UR STRIP.AUTO-MCNC: NEGATIVE MG/DL
LEUKOCYTE ESTERASE UR QL STRIP.AUTO: ABNORMAL
MICRO URNS: ABNORMAL
NITRITE UR QL STRIP.AUTO: NEGATIVE
PH UR STRIP.AUTO: 5 [PH]
PROT UR QL STRIP: NEGATIVE MG/DL
RBC # URNS HPF: ABNORMAL /HPF
RBC UR QL AUTO: NEGATIVE
SP GR UR STRIP.AUTO: 1.02
UROBILINOGEN UR STRIP.AUTO-MCNC: 0.2 MG/DL
WBC #/AREA URNS HPF: ABNORMAL /HPF

## 2018-01-31 PROCEDURE — 87086 URINE CULTURE/COLONY COUNT: CPT

## 2018-01-31 PROCEDURE — 81001 URINALYSIS AUTO W/SCOPE: CPT

## 2018-01-31 PROCEDURE — 87186 SC STD MICRODIL/AGAR DIL: CPT

## 2018-01-31 PROCEDURE — 87077 CULTURE AEROBIC IDENTIFY: CPT

## 2018-02-01 RX ORDER — NITROFURANTOIN 25; 75 MG/1; MG/1
100 CAPSULE ORAL 2 TIMES DAILY
Qty: 10 CAP | Refills: 0 | Status: SHIPPED | OUTPATIENT
Start: 2018-02-01 | End: 2018-02-06

## 2018-02-02 LAB
BACTERIA UR CULT: ABNORMAL
BACTERIA UR CULT: ABNORMAL
SIGNIFICANT IND 70042: ABNORMAL
SITE SITE: ABNORMAL
SOURCE SOURCE: ABNORMAL

## 2018-02-28 DIAGNOSIS — Z86.718 HISTORY OF DVT (DEEP VEIN THROMBOSIS): ICD-10-CM

## 2018-04-18 ENCOUNTER — PATIENT OUTREACH (OUTPATIENT)
Dept: HEALTH INFORMATION MANAGEMENT | Facility: OTHER | Age: 73
End: 2018-04-18

## 2018-04-18 NOTE — PROGRESS NOTES
1. Attempt #: 1    2. HealthConnect Verified: yes    3. Verify PCP: yes    4. Care Team Updated:       •   DME Company (gait device, O2, CPAP, etc.): YES       •   Other Specialists (eye doctor, derm, GYN, cardiology, endo, etc): YES    5.  Reviewed/Updated the following with patient:       •   Communication Preference Obtained? YES       •   Preferred Pharmacy? YES       •   Preferred Lab? YES       •   Family History (document living status of immediate family members and if + hx of cancer, diabetes, hypertension, hyperlipidemia, heart attack, stroke) YES. Was Abstract Encounter opened and chart updated? YES    6. [employer] Activation: already active    7. [employer] Jose Cruz: no    8. Annual Wellness Visit Scheduling  Scheduling Status:Scheduled      9. Care Gap Scheduling (Attempt to Schedule EACH Overdue Care Gap!)     Health Maintenance Due   Topic Date Due   • MAMMOGRAM  04/11/2014        SCHEDULED AWV-PT STATES SHE ONLY DOES SONO MAMMO/TRANSFERRED TO IMAGING    10. Patient was advised: “This is a free wellness visit. The provider will screen for medical conditions to help you stay healthy. If you have other concerns to address you may be asked to discuss these at a separate visit or there may be an additional fee.”     11. Patient was informed to arrive 15 min prior to their scheduled appointment and bring in their medication bottles.

## 2018-05-14 RX ORDER — LISINOPRIL 20 MG/1
20 TABLET ORAL DAILY
Qty: 90 TAB | Refills: 3 | Status: SHIPPED | OUTPATIENT
Start: 2018-05-14 | End: 2018-05-22

## 2018-05-22 ENCOUNTER — OFFICE VISIT (OUTPATIENT)
Dept: MEDICAL GROUP | Facility: MEDICAL CENTER | Age: 73
End: 2018-05-22
Payer: MEDICARE

## 2018-05-22 VITALS
HEART RATE: 70 BPM | TEMPERATURE: 97.5 F | BODY MASS INDEX: 30.22 KG/M2 | RESPIRATION RATE: 16 BRPM | HEIGHT: 66 IN | DIASTOLIC BLOOD PRESSURE: 66 MMHG | OXYGEN SATURATION: 96 % | WEIGHT: 188.05 LBS | SYSTOLIC BLOOD PRESSURE: 130 MMHG

## 2018-05-22 DIAGNOSIS — Z00.00 MEDICARE ANNUAL WELLNESS VISIT, SUBSEQUENT: ICD-10-CM

## 2018-05-22 DIAGNOSIS — I10 ESSENTIAL HYPERTENSION: ICD-10-CM

## 2018-05-22 DIAGNOSIS — Z13.220 SCREENING, LIPID: ICD-10-CM

## 2018-05-22 DIAGNOSIS — R05.9 COUGH: ICD-10-CM

## 2018-05-22 DIAGNOSIS — Z86.711 HISTORY OF PULMONARY EMBOLISM: ICD-10-CM

## 2018-05-22 DIAGNOSIS — Z12.31 VISIT FOR SCREENING MAMMOGRAM: ICD-10-CM

## 2018-05-22 DIAGNOSIS — M76.61 ACHILLES TENDINITIS OF RIGHT LOWER EXTREMITY: ICD-10-CM

## 2018-05-22 PROBLEM — I82.402 LEFT LEG DVT (HCC): Status: RESOLVED | Noted: 2017-02-03 | Resolved: 2018-05-22

## 2018-05-22 PROBLEM — I26.99 PULMONARY EMBOLI (HCC): Status: RESOLVED | Noted: 2017-02-02 | Resolved: 2018-05-22

## 2018-05-22 PROCEDURE — G0439 PPPS, SUBSEQ VISIT: HCPCS | Performed by: INTERNAL MEDICINE

## 2018-05-22 PROCEDURE — 99214 OFFICE O/P EST MOD 30 MIN: CPT | Mod: 25 | Performed by: INTERNAL MEDICINE

## 2018-05-22 RX ORDER — ASPIRIN 81 MG/1
81 TABLET, CHEWABLE ORAL DAILY
COMMUNITY
End: 2020-12-18

## 2018-05-22 RX ORDER — LOSARTAN POTASSIUM 50 MG/1
50 TABLET ORAL DAILY
Qty: 90 TAB | Refills: 3 | Status: SHIPPED | OUTPATIENT
Start: 2018-05-22 | End: 2019-05-23 | Stop reason: SDUPTHER

## 2018-05-22 ASSESSMENT — PATIENT HEALTH QUESTIONNAIRE - PHQ9: CLINICAL INTERPRETATION OF PHQ2 SCORE: 0

## 2018-05-22 ASSESSMENT — ACTIVITIES OF DAILY LIVING (ADL): BATHING_REQUIRES_ASSISTANCE: 0

## 2018-05-22 ASSESSMENT — ENCOUNTER SYMPTOMS: GENERAL WELL-BEING: GOOD

## 2018-05-22 NOTE — PROGRESS NOTES
CC: Wellness examination complaining of cough and heel pain.    HPI:   Katy presents today with the following.      1. Medicare annual wellness visit, subsequent  Screenings performed below    2. Cough  Reports a cough that is fairly nonproductive for the last several months.  She has been on lisinopril for some time.  She denies any fevers or chills no heartburn no history of asthma.    3. Essential hypertension  Again tolerating lisinopril well reports fair control blood pressure at home when checking.    4. Achilles tendinitis of right lower extremity  Complaining of right heel pain painful when first getting out of bed in the morning with stiffness.  Denies any specific injury no swelling to the leg.  Pain is 2 out of 10 intensity without radiation or associated symptoms.      Information for advance directives given to patient or instructed to bring in advance directives into to office to put in chart.      Depression Screening    Little interest or pleasure in doing things?  0 - not at all  Feeling down, depressed , or hopeless? 0 - not at all  Patient Health Questionnaire Score: 0     If depressive symptoms identified deferred to follow up visit unless specifically addressed in assessment and plan.    Interpretation of PHQ-9 Total Score   Score Severity   1-4 No Depression   5-9 Mild Depression   10-14 Moderate Depression   15-19 Moderately Severe Depression   20-27 Severe Depression    Screening for Cognitive Impairment    Three Minute Recall (leader, season, table) 3/3    Hari clock face with all 12 numbers and set the hands to show 10 past 11.  Yes 4/5  Cognitive concerns identified deferred for follow up unless specifically addressed in assessment and plan.    Fall Risk Assessment    Has the patient had two or more falls in the last year or any fall with injury in the last year?  Yes    Safety Assessment    Throw rugs on floor.  Yes  Handrails on all stairs.  Yes  Good lighting in all hallways.   Yes  Difficulty hearing.  No  Patient counseled about all safety risks that were identified.    Functional Assessment ADLs    Are there any barriers preventing you from cooking for yourself or meeting nutritional needs?  No.    Are there any barriers preventing you from driving safely or obtaining transportation?  No.    Are there any barriers preventing you from using a telephone or calling for help?  No.    Are there any barriers preventing you from shopping?  No.    Are there any barriers preventing you from taking care of your own finances?  No.    Are there any barriers preventing you from managing your medications?  No.    Are there any barriers preventing you from showering, bathing or dressing yourself?  No.    Are you currently engaging in any exercise or physical activity?  Yes.  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   What is your perception of your health?  Good.      Health Maintenance Summary                MAMMOGRAM Overdue 4/11/2014      Done 4/11/2013      Patient has more history with this topic...    Annual Wellness Visit Overdue 5/19/2018      Done 5/18/2017 Visit Dx: Medicare annual wellness visit, subsequent     Patient has more history with this topic...    BONE DENSITY Next Due 3/3/2020      Done 3/3/2015 DS-BONE DENSITY STUDY (DEXA)     Patient has more history with this topic...    COLONOSCOPY Next Due 6/11/2022      Done 6/11/2012 REFERRAL TO GI FOR COLONOSCOPY     Patient has more history with this topic...    IMM DTaP/Tdap/Td Vaccine Next Due 8/19/2023      Done 8/19/2013 Imm Admin: Tdap Vaccine     Patient has more history with this topic...          Patient Care Team:  Nicholas Darnell M.D. as PCP - General (Internal Medicine)  Jose Alexandra O.D as Consulting Physician (Optometry)  Ten Burton D.P.M. as Consulting Physician (Podiatry)            Health Care Screening: Age-appropriate preventive services that Medicare covers were discussed today and  ordered as indicated and agreed upon by the patient, and as recommended by USPTF and ACIP.     Patient Active Problem List    Diagnosis Date Noted   • History of pulmonary embolism 05/22/2018   • Bunion of great toe of right foot 01/10/2017   • Essential hypertension 05/02/2016   • Osteoarthrosis involving lower leg 11/05/2013   • Arthritis 05/16/2012       Current Outpatient Prescriptions   Medication Sig Dispense Refill   • aspirin (ASA) 81 MG Chew Tab chewable tablet Take 81 mg by mouth every day.     • losartan (COZAAR) 50 MG Tab Take 1 Tab by mouth every day. 90 Tab 3   • Diclofenac Sodium (VOLTAREN) 1 % Gel Apply 2-4 g to skin as directed 4 times a day. 5 Tube 6   • Cholecalciferol (VITAMIN D3) 5000 UNITS CAPS Take 1 Cap by mouth every day.       No current facility-administered medications for this visit.        Family History   Problem Relation Age of Onset   • Hypertension Father    • Heart Attack Father    • Heart Failure Mother    • Breast Cancer Maternal Grandmother    • No Known Problems Sister    • Heart Attack Maternal Grandfather    • No Known Problems Paternal Grandmother    • No Known Problems Paternal Grandfather        Social History     Social History   • Marital status:      Spouse name: N/A   • Number of children: N/A   • Years of education: N/A     Occupational History   • Not on file.     Social History Main Topics   • Smoking status: Never Smoker   • Smokeless tobacco: Never Used   • Alcohol use 4.2 - 6.0 oz/week     7 - 10 Glasses of wine per week   • Drug use: Yes     Frequency: 1.0 time per week     Types: Marijuana   • Sexual activity: No     Other Topics Concern   • Not on file     Social History Narrative   • No narrative on file       Past Surgical History:   Procedure Laterality Date   • BUNIONECTOMY DANK Right 1/10/2017    Procedure: BUNIONECTOMY DANK 2ND;  Surgeon: Ten Burton D.P.M.;  Location: SURGERY SURGICAL [facility] ORS;  Service:    • HAMMERTOE CORRECTION Right  "1/10/2017    Procedure: HAMMERTOE CORRECTION;  Surgeon: Ten Burton D.P.M.;  Location: SURGERY SURGICAL [facility] ORS;  Service:    • OTHER ORTHOPEDIC SURGERY  2017    bunion   • KNEE ARTHROPLASTY TOTAL  11/5/2013    Performed by Luis Faulkner M.D. at SURGERY [facility] ORS   • GYN SURGERY  1977    tubal   • OTHER  1970    breast implants fred   • LUMPECTOMY     • PB ENLARGE BREAST WITH IMPLANT         Allergies as of 05/22/2018 - Reviewed 05/22/2018   Allergen Reaction Noted   • Pcn [penicillins] Rash 05/16/2012        ROS: Denies Chest pain, SOB, LE edema.    /66   Pulse 70   Temp 36.4 °C (97.5 °F)   Resp 16   Ht 1.68 m (5' 6.14\")   Wt 85.3 kg (188 lb 0.8 oz)   SpO2 96%   BMI 30.22 kg/m²     Physical Exam:  Gen:         Alert and oriented, No apparent distress.  Neck:        No Lymphadenopathy or Bruits.  Lungs:     Clear to auscultation bilaterally  CV:          Regular rate and rhythm. No murmurs, rubs or gallops.  Abd:         Soft non tender, non distended. Normal active bowel sounds.  No  Hepatosplenomegaly, No pulsatile masses.                   Ext:          No clubbing, cyanosis, edema.      Assessment and Plan.   73 y.o. female with the following issues.    1. Medicare annual wellness visit, subsequent  Discussed healthy lifestyle habits as well as screening regimens.  - Annual Wellness Visit - Includes PPPS Subsequent ()    2. Cough  Switching blood pressure medications as it may be association likely related to allergies have recommended over-the-counter nasal steroid.    3. Essential hypertension  Switching from lisinopril to losartan at 50 mg caution about side effects medication sent to pharmacy    4. Achilles tendinitis of right lower extremity  Trial of topical creams if not improving referral to physical therapy  - Diclofenac Sodium (VOLTAREN) 1 % Gel; Apply 2-4 g to skin as directed 4 times a day.  Dispense: 5 Tube; Refill: 6    5. History of pulmonary embolism  Status post " treatment no signs of recurrence  - Annual Wellness Visit - Includes PPPS Subsequent ()    6. BMI 30.0-30.9,adult  Has gone up slightly discussion about weight loss  - Patient identified as having weight management issue.  Appropriate orders and counseling given.  - Annual Wellness Visit - Includes PPPS Subsequent ()    7. Screening, lipid    - COMP METABOLIC PANEL; Future  - LIPID PROFILE; Future    8. Visit for screening mammogram    - US-SCREENING WHOLE BREAST BILATERAL (3D SCREENING); Future        Referrals offered: Community-based lifestyle interventions to reduce health risks and promote self-management and wellness, fall prevention, nutrition, physical activity, tobacco-use cessation, weight loss, and mental health services as per orders if indicated.    Discussion today about general wellness and lifestyle habits:    · Prevent falls and reduce trip hazards; Cautioned about securing or removing rugs.  · Have a working fire alarm and carbon monoxide detector;   · Engage in regular physical activity and social activities

## 2018-05-22 NOTE — TELEPHONE ENCOUNTER
Pharmacy called and they need the directions in grams not inches. She stated 1g is about 1 inch. The normal directions are 2-4g. Please re-send rx.

## 2018-06-13 ENCOUNTER — HOSPITAL ENCOUNTER (OUTPATIENT)
Dept: LAB | Facility: MEDICAL CENTER | Age: 73
End: 2018-06-13
Attending: INTERNAL MEDICINE
Payer: MEDICARE

## 2018-06-13 DIAGNOSIS — Z13.220 SCREENING, LIPID: ICD-10-CM

## 2018-06-13 LAB
ALBUMIN SERPL BCP-MCNC: 4.2 G/DL (ref 3.2–4.9)
ALBUMIN/GLOB SERPL: 1.6 G/DL
ALP SERPL-CCNC: 111 U/L (ref 30–99)
ALT SERPL-CCNC: 17 U/L (ref 2–50)
ANION GAP SERPL CALC-SCNC: 6 MMOL/L (ref 0–11.9)
AST SERPL-CCNC: 14 U/L (ref 12–45)
BILIRUB SERPL-MCNC: 0.7 MG/DL (ref 0.1–1.5)
BUN SERPL-MCNC: 19 MG/DL (ref 8–22)
CALCIUM SERPL-MCNC: 10 MG/DL (ref 8.5–10.5)
CHLORIDE SERPL-SCNC: 114 MMOL/L (ref 96–112)
CHOLEST SERPL-MCNC: 198 MG/DL (ref 100–199)
CO2 SERPL-SCNC: 23 MMOL/L (ref 20–33)
CREAT SERPL-MCNC: 1.01 MG/DL (ref 0.5–1.4)
GLOBULIN SER CALC-MCNC: 2.7 G/DL (ref 1.9–3.5)
GLUCOSE SERPL-MCNC: 105 MG/DL (ref 65–99)
HDLC SERPL-MCNC: 51 MG/DL
LDLC SERPL CALC-MCNC: 121 MG/DL
POTASSIUM SERPL-SCNC: 3.9 MMOL/L (ref 3.6–5.5)
PROT SERPL-MCNC: 6.9 G/DL (ref 6–8.2)
SODIUM SERPL-SCNC: 143 MMOL/L (ref 135–145)
TRIGL SERPL-MCNC: 129 MG/DL (ref 0–149)

## 2018-06-13 PROCEDURE — 80061 LIPID PANEL: CPT

## 2018-06-13 PROCEDURE — 36415 COLL VENOUS BLD VENIPUNCTURE: CPT

## 2018-06-13 PROCEDURE — 80053 COMPREHEN METABOLIC PANEL: CPT

## 2018-07-24 NOTE — IP AVS SNAPSHOT
2/4/2017          Katy Christianson Chris  [address]  [place] [address]    Dear Katy:    [facility] wants to ensure your discharge home is safe and you or your loved ones have had all your questions answered regarding your care after you leave the hospital.    You may receive a telephone call within two days of your discharge.  This call is to make certain you understand your discharge instructions as well as ensure we provided you with the best care possible during your stay with us.     The call will only last approximately 3-5 minutes and will be done by a nurse.    Once again, we want to ensure your discharge home is safe and that you have a clear understanding of any next steps in your care.  If you have any questions or concerns, please do not hesitate to contact us, we are here for you.  Thank you for choosing [facility] for your healthcare needs.    Sincerely,    Robert Peck    [facility]         Crease it is 100 mg but need to know how much to send to her pharmacy for 1 month to see if this dose works

## 2018-11-19 ENCOUNTER — PATIENT OUTREACH (OUTPATIENT)
Dept: HEALTH INFORMATION MANAGEMENT | Facility: OTHER | Age: 73
End: 2018-11-19

## 2018-11-19 NOTE — PROGRESS NOTES
Outcome: no answer    Please transfer to Patient Outreach Team at [phone] when patient returns call.        Attempt # 1

## 2019-01-07 NOTE — PROGRESS NOTES
Outcome: no answer    Please transfer to Patient Outreach Team at [phone] when patient returns call.        Attempt # 2

## 2019-01-22 NOTE — PROGRESS NOTES
Outcome: Left Message    Please transfer to Patient Outreach Team at [phone] when patient returns call.    Attempt # 3

## 2019-02-20 ENCOUNTER — PATIENT OUTREACH (OUTPATIENT)
Dept: HEALTH INFORMATION MANAGEMENT | Facility: OTHER | Age: 74
End: 2019-02-20

## 2019-02-20 NOTE — PROGRESS NOTES
Outcome: Someone answered the phone and hung up without saying anything     Please transfer to Patient Outreach Team at [phone] when patient returns call.    WebIZ Checked & Epic Updated:  yes    HealthConnect Verified: yes    Attempt # 1

## 2019-02-26 NOTE — PROGRESS NOTES
Outcome: Left Message    Please transfer to Patient Outreach Team at [phone] when patient returns call.    Attempt # 2

## 2019-03-30 ENCOUNTER — HOSPITAL ENCOUNTER (OUTPATIENT)
Dept: RADIOLOGY | Facility: MEDICAL CENTER | Age: 74
End: 2019-03-30
Attending: INTERNAL MEDICINE
Payer: MEDICARE

## 2019-03-30 DIAGNOSIS — R92.30 DENSE BREAST TISSUE: ICD-10-CM

## 2019-03-30 PROCEDURE — 76641 ULTRASOUND BREAST COMPLETE: CPT

## 2019-05-28 RX ORDER — LOSARTAN POTASSIUM 50 MG/1
50 TABLET ORAL DAILY
Qty: 100 TAB | Refills: 0 | Status: SHIPPED | OUTPATIENT
Start: 2019-05-28 | End: 2019-05-30 | Stop reason: SDUPTHER

## 2019-05-30 ENCOUNTER — OFFICE VISIT (OUTPATIENT)
Dept: MEDICAL GROUP | Facility: MEDICAL CENTER | Age: 74
End: 2019-05-30
Payer: MEDICARE

## 2019-05-30 VITALS
HEART RATE: 52 BPM | HEIGHT: 66 IN | DIASTOLIC BLOOD PRESSURE: 56 MMHG | TEMPERATURE: 97 F | SYSTOLIC BLOOD PRESSURE: 114 MMHG | WEIGHT: 163 LBS | OXYGEN SATURATION: 97 % | BODY MASS INDEX: 26.2 KG/M2

## 2019-05-30 DIAGNOSIS — N18.30 STAGE 3 CHRONIC KIDNEY DISEASE (HCC): ICD-10-CM

## 2019-05-30 DIAGNOSIS — Z00.00 MEDICARE ANNUAL WELLNESS VISIT, SUBSEQUENT: ICD-10-CM

## 2019-05-30 DIAGNOSIS — Z86.711 HISTORY OF PULMONARY EMBOLISM: ICD-10-CM

## 2019-05-30 DIAGNOSIS — Z71.89 CARDIAC RISK COUNSELING: ICD-10-CM

## 2019-05-30 DIAGNOSIS — Z13.220 SCREENING, LIPID: ICD-10-CM

## 2019-05-30 DIAGNOSIS — Z11.59 NEED FOR HEPATITIS C SCREENING TEST: ICD-10-CM

## 2019-05-30 DIAGNOSIS — I10 ESSENTIAL HYPERTENSION: ICD-10-CM

## 2019-05-30 DIAGNOSIS — M19.90 ARTHRITIS: Chronic | ICD-10-CM

## 2019-05-30 PROCEDURE — G0439 PPPS, SUBSEQ VISIT: HCPCS | Performed by: INTERNAL MEDICINE

## 2019-05-30 PROCEDURE — 8041 PR SCP AHA: Performed by: INTERNAL MEDICINE

## 2019-05-30 RX ORDER — LOSARTAN POTASSIUM 50 MG/1
50 TABLET ORAL DAILY
Qty: 100 TAB | Refills: 3 | Status: SHIPPED | OUTPATIENT
Start: 2019-05-30 | End: 2020-06-27

## 2019-05-30 RX ORDER — ROSUVASTATIN CALCIUM 5 MG/1
5 TABLET, COATED ORAL EVERY EVENING
Qty: 100 TAB | Refills: 3 | Status: SHIPPED | OUTPATIENT
Start: 2019-05-30 | End: 2020-06-29

## 2019-05-30 ASSESSMENT — PATIENT HEALTH QUESTIONNAIRE - PHQ9: CLINICAL INTERPRETATION OF PHQ2 SCORE: 0

## 2019-05-30 ASSESSMENT — ACTIVITIES OF DAILY LIVING (ADL): BATHING_REQUIRES_ASSISTANCE: 0

## 2019-05-30 ASSESSMENT — ENCOUNTER SYMPTOMS: GENERAL WELL-BEING: GOOD

## 2019-05-30 NOTE — PROGRESS NOTES
Annual Health Assessment Questions:    1.  Are you currently engaging in any exercise or physical activity? Yes    2.  How would you describe your mood or emotional well-being today? good    3.  Have you had any falls in the last year? Yes    4.  Have you noticed any problems with your balance or had difficulty walking? No    5.  In the last six months have you experienced any leakage of urine? No    6. DPA/Advanced Directive: Patient has Advanced Directive on file.         CC: Wellness examination and cardiac counseling.                                                                                                                                      HPI:   Katy presents today with the following.    1. Medicare annual wellness visit, subsequent  Screenings performed below advance directives already on file.    2. Cardiac risk counseling  Presents today with a cardiac risk calculation of over 12% thus warranting a discussion about statins and she has no personal history of heart disease no chest pains or shortness of breath.  She is interested in reducing her risk given the fact that she has hypertension.    Depression Screening    Little interest or pleasure in doing things?  0 - not at all  Feeling down, depressed , or hopeless? 0 - not at all  Patient Health Questionnaire Score: 0     If depressive symptoms identified deferred to follow up visit unless specifically addressed in assessment and plan.    Interpretation of PHQ-9 Total Score   Score Severity   1-4 No Depression   5-9 Mild Depression   10-14 Moderate Depression   15-19 Moderately Severe Depression   20-27 Severe Depression    Screening for Cognitive Impairment    Three Minute Recall (village, kitchen, baby) 2/3    Hari clock face with all 12 numbers and set the hands to show 10 past 10.       Cognitive concerns identified deferred for follow up unless specifically addressed in assessment and plan.    Fall Risk Assessment    Has the patient had two or  more falls in the last year or any fall with injury in the last year?  No    Safety Assessment    Throw rugs on floor.  Yes  Handrails on all stairs.  Yes  Good lighting in all hallways.  Yes  Difficulty hearing.  No  Patient counseled about all safety risks that were identified.    Functional Assessment ADLs    Are there any barriers preventing you from cooking for yourself or meeting nutritional needs?  No.    Are there any barriers preventing you from driving safely or obtaining transportation?  No.    Are there any barriers preventing you from using a telephone or calling for help?  No.    Are there any barriers preventing you from shopping?  No.    Are there any barriers preventing you from taking care of your own finances?  No.    Are there any barriers preventing you from managing your medications?  No.    Are there any barriers preventing you from showering, bathing or dressing yourself?  No.    Are you currently engaging in any exercise or physical activity?  Yes.     What is your perception of your health?  Good.      Health Maintenance Summary                HEPATITIS C SCREENING Overdue 1947     IMM ZOSTER VACCINES Postponed 6/12/2036 Originally 5/11/1997. Insurance/Financial    BONE DENSITY Next Due 3/3/2020      Done 3/3/2015 DS-BONE DENSITY STUDY (DEXA)     Patient has more history with this topic...    Annual Wellness Visit Next Due 5/30/2020      Done 5/30/2019 SUBSEQUENT ANNUAL WELLNESS VISIT-INCLUDES PPPS ()     Patient has more history with this topic...    COLONOSCOPY Next Due 6/11/2022      Done 6/11/2012 REFERRAL TO GI FOR COLONOSCOPY     Patient has more history with this topic...    IMM DTaP/Tdap/Td Vaccine Next Due 8/19/2023      Done 8/19/2013 Imm Admin: Tdap Vaccine     Patient has more history with this topic...          Patient Care Team:  Nicholas Darnell M.D. as PCP - General (Internal Medicine)  Jose Alexandra O.D as Consulting Physician (Optometry)  Ten Burton,  "FELIX.P.LORIN as Consulting Physician (Podiatry)      Patient Active Problem List    Diagnosis Date Noted   • Cardiac risk counseling 05/30/2019   • Stage 3 chronic kidney disease (HCC) 05/30/2019   • History of pulmonary embolism 05/22/2018   • Bunion of great toe of right foot 01/10/2017   • Essential hypertension 05/02/2016   • Osteoarthrosis involving lower leg 11/05/2013   • Arthritis 05/16/2012       Current Outpatient Prescriptions   Medication Sig Dispense Refill   • rosuvastatin (CRESTOR) 5 MG Tab Take 1 Tab by mouth every evening. 100 Tab 3   • losartan (COZAAR) 50 MG Tab Take 1 Tab by mouth every day. 100 Tab 3   • aspirin (ASA) 81 MG Chew Tab chewable tablet Take 81 mg by mouth every day.     • Diclofenac Sodium (VOLTAREN) 1 % Gel Apply 2-4 g to skin as directed 4 times a day. (Patient not taking: Reported on 5/30/2019) 5 Tube 6     No current facility-administered medications for this visit.          Allergies as of 05/30/2019 - Reviewed 05/30/2019   Allergen Reaction Noted   • Pcn [penicillins] Rash 05/16/2012        ROS: Denies Chest pain, SOB, LE edema.    /56 (BP Location: Right arm, Patient Position: Sitting)   Pulse (!) 52   Temp 36.1 °C (97 °F)   Ht 1.676 m (5' 6\")   Wt 73.9 kg (163 lb)   SpO2 97%   BMI 26.31 kg/m²     Physical Exam:  Gen:         Alert and oriented, No apparent distress.  Neck:        No Lymphadenopathy or Bruits.  Lungs:     Clear to auscultation bilaterally  CV:          Regular rate and rhythm. No murmurs, rubs or gallops.               Ext:          No clubbing, cyanosis, edema.      Assessment and Plan.   74 y.o. female with the following issues.    1. Medicare annual wellness visit, subsequent  Discussed healthy lifestyle habits as well as screening regimens.  - Subsequent Annual Wellness Visit - Includes PPPS ()    2. Cardiac risk counseling  Discussions of benefits and risk starting on Crestor 5 mg recheck blood work in 2 months.  - rosuvastatin (CRESTOR) 5 MG " Tab; Take 1 Tab by mouth every evening.  Dispense: 100 Tab; Refill: 3    3. Stage 3 chronic kidney disease (HCC)  About avoidance of nephrotoxins  - Subsequent Annual Wellness Visit - Includes PPPS ()    4. Essential hypertension  Currently well controlled, Discuss diet, exercise and salt restriction.  No change to medication therapy.  - Subsequent Annual Wellness Visit - Includes PPPS ()  - losartan (COZAAR) 50 MG Tab; Take 1 Tab by mouth every day.  Dispense: 100 Tab; Refill: 3    5. History of pulmonary embolism  Continue to monitor  - Subsequent Annual Wellness Visit - Includes PPPS ()    6. Arthritis  Stable on topical anti-inflammatories  - Subsequent Annual Wellness Visit - Includes PPPS ()  7. Need for hepatitis C screening test    - HEP C VIRUS ANTIBODY; Future    8. Screening, lipid  - Comp Metabolic Panel; Future  - Lipid Profile; Future

## 2019-06-15 ENCOUNTER — OFFICE VISIT (OUTPATIENT)
Dept: URGENT CARE | Facility: PHYSICIAN GROUP | Age: 74
End: 2019-06-15
Payer: MEDICARE

## 2019-06-15 ENCOUNTER — HOSPITAL ENCOUNTER (OUTPATIENT)
Facility: MEDICAL CENTER | Age: 74
End: 2019-06-15
Attending: NURSE PRACTITIONER
Payer: MEDICARE

## 2019-06-15 VITALS
OXYGEN SATURATION: 97 % | BODY MASS INDEX: 25.11 KG/M2 | HEART RATE: 64 BPM | RESPIRATION RATE: 15 BRPM | WEIGHT: 160 LBS | TEMPERATURE: 98 F | DIASTOLIC BLOOD PRESSURE: 70 MMHG | HEIGHT: 67 IN | SYSTOLIC BLOOD PRESSURE: 116 MMHG

## 2019-06-15 DIAGNOSIS — L08.9 SKIN INFECTION: ICD-10-CM

## 2019-06-15 PROCEDURE — 87205 SMEAR GRAM STAIN: CPT

## 2019-06-15 PROCEDURE — 87070 CULTURE OTHR SPECIMN AEROBIC: CPT

## 2019-06-15 PROCEDURE — 99214 OFFICE O/P EST MOD 30 MIN: CPT | Performed by: NURSE PRACTITIONER

## 2019-06-15 RX ORDER — DOXYCYCLINE HYCLATE 100 MG/1
100 CAPSULE ORAL 2 TIMES DAILY
Qty: 14 CAP | Refills: 0 | Status: SHIPPED | OUTPATIENT
Start: 2019-06-15 | End: 2019-06-22

## 2019-06-15 ASSESSMENT — ENCOUNTER SYMPTOMS
TINGLING: 0
MYALGIAS: 0
NAUSEA: 0
HEADACHES: 0
DIZZINESS: 0
SENSORY CHANGE: 0
FEVER: 0
CHILLS: 0

## 2019-06-15 NOTE — PROGRESS NOTES
Subjective:      Katy Perez is a 74 y.o. female who presents with Spider Bite ( a few days ago was bite by a spider )            HPI New. 74 year old female with skin infection to inside of right wrist. She states she was bitten by spider/insect while throwing hay. Since that time she has progressive erythema and peeling appearance of skin as well as discreet pools of purulent discharge within the erythematous area. She denies any fever, chills, myalgia, nausea or headache. She has been using neosporin only for this. No history of staph infections.  Pcn [penicillins]  Current Outpatient Prescriptions on File Prior to Visit   Medication Sig Dispense Refill   • rosuvastatin (CRESTOR) 5 MG Tab Take 1 Tab by mouth every evening. 100 Tab 3   • Diclofenac Sodium (VOLTAREN) 1 % Gel Apply 2-4 g to skin as directed 4 times a day. 5 Tube 6   • losartan (COZAAR) 50 MG Tab Take 1 Tab by mouth every day. (Patient not taking: Reported on 6/15/2019) 100 Tab 3   • aspirin (ASA) 81 MG Chew Tab chewable tablet Take 81 mg by mouth every day.       No current facility-administered medications on file prior to visit.      Social History     Social History   • Marital status:      Spouse name: N/A   • Number of children: N/A   • Years of education: N/A     Occupational History   • Not on file.     Social History Main Topics   • Smoking status: Never Smoker   • Smokeless tobacco: Never Used   • Alcohol use 4.2 - 6.0 oz/week     7 - 10 Glasses of wine per week   • Drug use: Yes     Frequency: 1.0 time per week     Types: Marijuana   • Sexual activity: No     Other Topics Concern   • Not on file     Social History Narrative   • No narrative on file     family history includes Breast Cancer in her maternal grandmother; Heart Attack in her father and maternal grandfather; Heart Failure in her mother; Hypertension in her father; No Known Problems in her paternal grandfather, paternal grandmother, and sister.      Review of  "Systems   Constitutional: Negative for chills, fever and malaise/fatigue.   Gastrointestinal: Negative for nausea.   Musculoskeletal: Negative for myalgias.   Skin: Positive for rash.   Neurological: Negative for dizziness, tingling, sensory change and headaches.          Objective:     /70   Pulse 64   Temp 36.7 °C (98 °F) (Temporal)   Resp 15   Ht 1.702 m (5' 7\")   Wt 72.6 kg (160 lb)   SpO2 97%   BMI 25.06 kg/m²      Physical Exam   Constitutional: She is oriented to person, place, and time. She appears well-developed and well-nourished. No distress.   Cardiovascular: Normal rate, regular rhythm and normal heart sounds.    No murmur heard.  Pulmonary/Chest: Effort normal and breath sounds normal.   Musculoskeletal: Normal range of motion.   Neurological: She is alert and oriented to person, place, and time.   Skin: Skin is warm and dry. There is erythema.   Circular area of erythema on inside of right wrist that has some peely appearance and discreet areas of purulence. Area cleaned with alcohol and 27 G. Needle used to unroof largest purulent area. Culture taken and would dressed.   Psychiatric: She has a normal mood and affect. Her behavior is normal. Thought content normal.   Nursing note reviewed.              Assessment/Plan:     1. Skin infection  CULTURE WOUND W/ GRAM STAIN    doxycycline (VIBRAMYCIN) 100 MG Cap     Doxycycline  Culture of wound.  Will call with results.  Strict follow up instructions reviewed with patient.  "

## 2019-06-16 LAB
GRAM STN SPEC: NORMAL
SIGNIFICANT IND 70042: NORMAL
SITE SITE: NORMAL
SOURCE SOURCE: NORMAL

## 2019-06-18 ENCOUNTER — TELEPHONE (OUTPATIENT)
Dept: URGENT CARE | Facility: CLINIC | Age: 74
End: 2019-06-18

## 2019-06-18 LAB
BACTERIA WND AEROBE CULT: ABNORMAL
BACTERIA WND AEROBE CULT: ABNORMAL
GRAM STN SPEC: ABNORMAL
SIGNIFICANT IND 70042: ABNORMAL
SITE SITE: ABNORMAL
SOURCE SOURCE: ABNORMAL

## 2019-06-20 ENCOUNTER — TELEPHONE (OUTPATIENT)
Dept: URGENT CARE | Facility: CLINIC | Age: 74
End: 2019-06-20

## 2019-06-20 DIAGNOSIS — B42.9: ICD-10-CM

## 2019-06-20 DIAGNOSIS — B42.89 CUTANEOUS SPOROTRICHOSIS: ICD-10-CM

## 2019-06-21 ENCOUNTER — TELEPHONE (OUTPATIENT)
Dept: MEDICAL GROUP | Facility: MEDICAL CENTER | Age: 74
End: 2019-06-21

## 2019-06-21 NOTE — TELEPHONE ENCOUNTER
Good afternoon Katy Lugo called and stated that she was told by the pharmacy that Itraconazole 200 MG Tab. it's not in the formulary drug and that she will have to pay almost $1000 for it. Pt would like to know if you can change the order for a similar prescription. Please advise.    Thank you,    Ayleen

## 2019-06-24 RX ORDER — FLUCONAZOLE 200 MG/1
400 TABLET ORAL DAILY
Qty: 28 TAB | Refills: 0 | Status: SHIPPED | OUTPATIENT
Start: 2019-06-24 | End: 2019-07-08

## 2019-06-24 NOTE — TELEPHONE ENCOUNTER
Discussed on mychart.  Please encourage her to call my office directly or use my chart.  (Routing comment)

## 2019-08-27 ENCOUNTER — HOSPITAL ENCOUNTER (OUTPATIENT)
Dept: LAB | Facility: MEDICAL CENTER | Age: 74
End: 2019-08-27
Attending: INTERNAL MEDICINE
Payer: MEDICARE

## 2019-08-27 DIAGNOSIS — Z11.59 NEED FOR HEPATITIS C SCREENING TEST: ICD-10-CM

## 2019-08-27 DIAGNOSIS — Z13.220 SCREENING, LIPID: ICD-10-CM

## 2019-08-27 LAB
ALBUMIN SERPL BCP-MCNC: 4.1 G/DL (ref 3.2–4.9)
ALBUMIN/GLOB SERPL: 2 G/DL
ALP SERPL-CCNC: 91 U/L (ref 30–99)
ALT SERPL-CCNC: 15 U/L (ref 2–50)
ANION GAP SERPL CALC-SCNC: 7 MMOL/L (ref 0–11.9)
AST SERPL-CCNC: 14 U/L (ref 12–45)
BILIRUB SERPL-MCNC: 0.8 MG/DL (ref 0.1–1.5)
BUN SERPL-MCNC: 16 MG/DL (ref 8–22)
CALCIUM SERPL-MCNC: 10.1 MG/DL (ref 8.5–10.5)
CHLORIDE SERPL-SCNC: 112 MMOL/L (ref 96–112)
CHOLEST SERPL-MCNC: 117 MG/DL (ref 100–199)
CO2 SERPL-SCNC: 23 MMOL/L (ref 20–33)
CREAT SERPL-MCNC: 0.93 MG/DL (ref 0.5–1.4)
FASTING STATUS PATIENT QL REPORTED: NORMAL
GLOBULIN SER CALC-MCNC: 2.1 G/DL (ref 1.9–3.5)
GLUCOSE SERPL-MCNC: 95 MG/DL (ref 65–99)
HCV AB SER QL: NEGATIVE
HDLC SERPL-MCNC: 55 MG/DL
LDLC SERPL CALC-MCNC: 51 MG/DL
POTASSIUM SERPL-SCNC: 4.2 MMOL/L (ref 3.6–5.5)
PROT SERPL-MCNC: 6.2 G/DL (ref 6–8.2)
SODIUM SERPL-SCNC: 142 MMOL/L (ref 135–145)
TRIGL SERPL-MCNC: 54 MG/DL (ref 0–149)

## 2019-08-27 PROCEDURE — 36415 COLL VENOUS BLD VENIPUNCTURE: CPT

## 2019-08-27 PROCEDURE — 86803 HEPATITIS C AB TEST: CPT

## 2019-08-27 PROCEDURE — 80053 COMPREHEN METABOLIC PANEL: CPT

## 2019-08-27 PROCEDURE — 80061 LIPID PANEL: CPT

## 2020-08-26 ENCOUNTER — PATIENT OUTREACH (OUTPATIENT)
Dept: HEALTH INFORMATION MANAGEMENT | Facility: OTHER | Age: 75
End: 2020-08-26

## 2020-08-26 NOTE — PROGRESS NOTES
Outcome: Left Message    Please transfer to Patient Outreach Team at [phone] when patient returns call.      Attempt # 1

## 2020-09-04 NOTE — PROGRESS NOTES
Outcome: Left Message    Please transfer to Patient Outreach Team at [phone] when patient returns call.      Attempt # 2

## 2020-09-09 SDOH — ECONOMIC STABILITY: INCOME INSECURITY: HOW HARD IS IT FOR YOU TO PAY FOR THE VERY BASICS LIKE FOOD, HOUSING, MEDICAL CARE, AND HEATING?: NOT HARD AT ALL

## 2020-09-09 SDOH — ECONOMIC STABILITY: TRANSPORTATION INSECURITY
IN THE PAST 12 MONTHS, HAS LACK OF TRANSPORTATION KEPT YOU FROM MEETINGS, WORK, OR FROM GETTING THINGS NEEDED FOR DAILY LIVING?: NO

## 2020-09-09 SDOH — ECONOMIC STABILITY: FOOD INSECURITY: WITHIN THE PAST 12 MONTHS, YOU WORRIED THAT YOUR FOOD WOULD RUN OUT BEFORE YOU GOT MONEY TO BUY MORE.: NEVER TRUE

## 2020-09-09 SDOH — ECONOMIC STABILITY: FOOD INSECURITY: WITHIN THE PAST 12 MONTHS, THE FOOD YOU BOUGHT JUST DIDN'T LAST AND YOU DIDN'T HAVE MONEY TO GET MORE.: NEVER TRUE

## 2020-09-09 SDOH — ECONOMIC STABILITY: TRANSPORTATION INSECURITY
IN THE PAST 12 MONTHS, HAS THE LACK OF TRANSPORTATION KEPT YOU FROM MEDICAL APPOINTMENTS OR FROM GETTING MEDICATIONS?: NO

## 2020-09-09 NOTE — PROGRESS NOTES
1. Attempt #:2    2. HealthConnect Verified: no    3. Verify PCP: yes    4. Review Care Team: yes    5. WebIZ Checked & Epic Updated:   · Is patient due for Tdap? NO  · Is patient due for Shingles? NO    6. Reviewed/Updated the following with patient:       •   Communication Preference Obtained? YES       •   Preferred Pharmacy? YES       •   Preferred Lab? YES       •   Family History (document living status of immediate family members and if + hx of cancer, diabetes, hypertension, hyperlipidemia, heart attack, stroke) YES    7. Annual Wellness Visit Scheduling  · Scheduling Status:Scheduled     8. Care Gap Scheduling (Attempt to Schedule EACH Overdue Care Gap!)     Health Maintenance Due   Topic Date Due   • BONE DENSITY  03/03/2020   • Annual Wellness Visit  05/30/2020   • IMM INFLUENZA (1) 09/01/2020        Scheduled patient for Annual Wellness Visit and Immunizations: INFLUENZA     9. Office Center Activation: already active    10. Office Center Jose Cruz: yes    11. Virtual Visits: yes    12. Opt In to Text Messages: no    13. Patient was advised: “This is a free wellness visit. The provider will screen for medical conditions to help you stay healthy. If you have other concerns to address you may be asked to discuss these at a separate visit or there may be an additional fee.”     14. Patient was informed to arrive 15 min prior to their scheduled appointment and bring in their medication bottles.

## 2020-09-14 ENCOUNTER — OFFICE VISIT (OUTPATIENT)
Dept: MEDICAL GROUP | Facility: MEDICAL CENTER | Age: 75
End: 2020-09-14
Payer: MEDICARE

## 2020-09-14 VITALS
WEIGHT: 172 LBS | HEART RATE: 54 BPM | SYSTOLIC BLOOD PRESSURE: 124 MMHG | DIASTOLIC BLOOD PRESSURE: 70 MMHG | TEMPERATURE: 98.3 F | HEIGHT: 67 IN | BODY MASS INDEX: 27 KG/M2 | OXYGEN SATURATION: 95 %

## 2020-09-14 DIAGNOSIS — N18.30 STAGE 3 CHRONIC KIDNEY DISEASE: ICD-10-CM

## 2020-09-14 DIAGNOSIS — Z00.00 MEDICARE ANNUAL WELLNESS VISIT, SUBSEQUENT: ICD-10-CM

## 2020-09-14 DIAGNOSIS — Z91.89 OTHER SPECIFIED PERSONAL RISK FACTORS, NOT ELSEWHERE CLASSIFIED: ICD-10-CM

## 2020-09-14 DIAGNOSIS — I10 ESSENTIAL HYPERTENSION: ICD-10-CM

## 2020-09-14 DIAGNOSIS — Z86.711 HISTORY OF PULMONARY EMBOLISM: ICD-10-CM

## 2020-09-14 DIAGNOSIS — Z13.6 SCREENING FOR CARDIOVASCULAR CONDITION: ICD-10-CM

## 2020-09-14 DIAGNOSIS — N95.9 POST MENOPAUSAL PROBLEMS: ICD-10-CM

## 2020-09-14 PROCEDURE — 99213 OFFICE O/P EST LOW 20 MIN: CPT | Mod: 25 | Performed by: INTERNAL MEDICINE

## 2020-09-14 PROCEDURE — G0439 PPPS, SUBSEQ VISIT: HCPCS | Performed by: INTERNAL MEDICINE

## 2020-09-14 PROCEDURE — 8041 PR SCP AHA: Performed by: INTERNAL MEDICINE

## 2020-09-14 RX ORDER — LOSARTAN POTASSIUM 50 MG/1
50 TABLET ORAL DAILY
Qty: 100 TAB | Refills: 0 | Status: SHIPPED | OUTPATIENT
Start: 2020-09-14 | End: 2020-10-06

## 2020-09-14 ASSESSMENT — ACTIVITIES OF DAILY LIVING (ADL): BATHING_REQUIRES_ASSISTANCE: 0

## 2020-09-14 ASSESSMENT — PATIENT HEALTH QUESTIONNAIRE - PHQ9: CLINICAL INTERPRETATION OF PHQ2 SCORE: 0

## 2020-09-14 ASSESSMENT — ENCOUNTER SYMPTOMS: GENERAL WELL-BEING: GOOD

## 2020-09-14 NOTE — PROGRESS NOTES
Annual Health Assessment Questions:    1.  Are you currently engaging in any exercise or physical activity? Yes    2.  How would you describe your mood or emotional well-being today? good    3.  Have you had any falls in the last year? Yes    4.  Have you noticed any problems with your balance or had difficulty walking? No    5.  In the last six months have you experienced any leakage of urine? Yes    6. DPA/Advanced Directive: Patient has Durable Power of  on file.

## 2020-09-14 NOTE — PROGRESS NOTES
Annual Health Assessment Questions:    1.  Are you currently engaging in any exercise or physical activity? Yes    2.  How would you describe your mood or emotional well-being today? good    3.  Have you had any falls in the last year? Yes    4.  Have you noticed any problems with your balance or had difficulty walking? No    5.  In the last six months have you experienced any leakage of urine? Yes    6. DPA/Advanced Directive: Patient has Durable Power of  on file.         CC: Follow-up hypertension, cardiovascular lives, due for wellness examination.    HPI:   Katy presents today with the following.      1. Medicare annual wellness visit, subsequent  Screenings performed below advance directives already on file    2. Other specified personal risk factors, not elsewhere classified  Was started on a statin last year for mildly elevated cholesterol did not tolerate the medication is fatigue therefore stopped.  She does not have a vast number of other cardiovascular risk factors but is interested in quantifying her risk.  She is active without chest pain    3. Essential hypertension  Well-controlled on current regimen denying any dizziness or side effects medication.        Information for advance directives given to patient or instructed to bring in advance directives into to office to put in chart.      Depression Screening    Little interest or pleasure in doing things?  0 - not at all  Feeling down, depressed , or hopeless? 0 - not at all  Patient Health Questionnaire Score: 0     If depressive symptoms identified deferred to follow up visit unless specifically addressed in assessment and plan.    Interpretation of PHQ-9 Total Score   Score Severity   1-4 No Depression   5-9 Mild Depression   10-14 Moderate Depression   15-19 Moderately Severe Depression   20-27 Severe Depression    Screening for Cognitive Impairment    Three Minute Recall (river, nation, finger) 2/3    Hari clock face with all 12 numbers  and set the hands to show 10 past 11.  Yes 5/5  Cognitive concerns identified deferred for follow up unless specifically addressed in assessment and plan.    Fall Risk Assessment    Has the patient had two or more falls in the last year or any fall with injury in the last year?  Yes    Safety Assessment    Throw rugs on floor.  Yes  Handrails on all stairs.  Yes  Good lighting in all hallways.  Yes  Difficulty hearing.  No  Patient counseled about all safety risks that were identified.    Functional Assessment ADLs    Are there any barriers preventing you from cooking for yourself or meeting nutritional needs?  No.    Are there any barriers preventing you from driving safely or obtaining transportation?  No.    Are there any barriers preventing you from using a telephone or calling for help?  No.    Are there any barriers preventing you from shopping?  No.    Are there any barriers preventing you from taking care of your own finances?  No.    Are there any barriers preventing you from managing your medications?  No.    Are there any barriers preventing you from showering, bathing or dressing yourself?  No.    Are you currently engaging in any exercise or physical activity?  Yes.     What is your perception of your health?  Good.      Health Maintenance Summary                BONE DENSITY Overdue 3/3/2020      Done 3/3/2015 DS-BONE DENSITY STUDY (DEXA)     Patient has more history with this topic...    Annual Wellness Visit Overdue 5/30/2020      Done 5/30/2019 SUBSEQUENT ANNUAL WELLNESS VISIT-INCLUDES PPPS ()     Patient has more history with this topic...    IMM INFLUENZA Overdue 9/1/2020      Done 11/1/2019 Imm Admin: Influenza Vaccine Adult HD     Patient has more history with this topic...    IMM ZOSTER VACCINES Postponed 6/12/2036 Originally 5/11/1997. Insurance/Financial    COLONOSCOPY Next Due 6/11/2022      Done 6/11/2012 REFERRAL TO GI FOR COLONOSCOPY     Patient has more history with this topic...     IMM DTaP/Tdap/Td Vaccine Next Due 8/19/2023      Done 8/19/2013 Imm Admin: Tdap Vaccine     Patient has more history with this topic...          Patient Care Team:  Nicholas Darnell M.D. as PCP - General (Internal Medicine)  Charlotte Moore as              Health Care Screening: Age-appropriate preventive services that Medicare covers were discussed today and ordered as indicated and agreed upon by the patient, and as recommended by USPTF and ACIP.     Patient Active Problem List    Diagnosis Date Noted   • Cardiac risk counseling 05/30/2019   • Stage 3 chronic kidney disease (HCC) 05/30/2019   • History of pulmonary embolism 05/22/2018   • Bunion of great toe of right foot 01/10/2017   • Essential hypertension 05/02/2016   • Osteoarthrosis involving lower leg 11/05/2013       Current Outpatient Medications   Medication Sig Dispense Refill   • losartan (COZAAR) 50 MG Tab Take 1 Tab by mouth every day. TAKE ONE TABLET BY MOUTH DAILY 100 Tab 0   • Diclofenac Sodium (VOLTAREN) 1 % Gel Apply 2-4 g to skin as directed 4 times a day. 5 Tube 6   • aspirin (ASA) 81 MG Chew Tab chewable tablet Take 81 mg by mouth every day.       No current facility-administered medications for this visit.        Family History   Problem Relation Age of Onset   • Hypertension Father    • Heart Attack Father    • Heart Failure Mother    • Breast Cancer Maternal Grandmother    • No Known Problems Sister    • Heart Attack Maternal Grandfather    • No Known Problems Paternal Grandmother    • No Known Problems Paternal Grandfather        Social History     Socioeconomic History   • Marital status:      Spouse name: Not on file   • Number of children: Not on file   • Years of education: Not on file   • Highest education level: Not on file   Occupational History   • Not on file   Social Needs   • Financial resource strain: Not hard at all   • Food insecurity     Worry: Never true     Inability: Never true   •  Transportation needs     Medical: No     Non-medical: No   Tobacco Use   • Smoking status: Never Smoker   • Smokeless tobacco: Never Used   Substance and Sexual Activity   • Alcohol use: Yes     Alcohol/week: 4.2 - 6.0 oz     Types: 7 - 10 Glasses of wine per week   • Drug use: Yes     Frequency: 1.0 times per week     Types: Marijuana   • Sexual activity: Never     Partners: Male   Lifestyle   • Physical activity     Days per week: Not on file     Minutes per session: Not on file   • Stress: Not on file   Relationships   • Social connections     Talks on phone: Not on file     Gets together: Not on file     Attends Anabaptism service: Not on file     Active member of club or organization: Not on file     Attends meetings of clubs or organizations: Not on file     Relationship status: Not on file   • Intimate partner violence     Fear of current or ex partner: Not on file     Emotionally abused: Not on file     Physically abused: Not on file     Forced sexual activity: Not on file   Other Topics Concern   • Not on file   Social History Narrative   • Not on file       Past Surgical History:   Procedure Laterality Date   • BUNIONECTOMY DANK Right 1/10/2017    Procedure: BUNIONECTOMY DANK 2ND;  Surgeon: Ten Burton D.P.M.;  Location: SURGERY [facility] [place];  Service:    • HAMMERTOE CORRECTION Right 1/10/2017    Procedure: HAMMERTOE CORRECTION;  Surgeon: Ten Burton D.P.M.;  Location: SURGERY [facility] [place];  Service:    • OTHER ORTHOPEDIC SURGERY  2017    bunion   • KNEE ARTHROPLASTY TOTAL  11/5/2013    Performed by Luis Faulkner M.D. at SURGERY [facility]   • GYN SURGERY  1977    tubal   • OTHER  1970    breast implants fred   • LUMPECTOMY     • PB ENLARGE BREAST WITH IMPLANT         Allergies as of 09/14/2020 - Reviewed 09/14/2020   Allergen Reaction Noted   • Pcn [penicillins] Rash 05/16/2012        ROS: Denies Chest pain, SOB, LE edema.    /70 (BP Location: Left arm, Patient  "Position: Sitting)   Pulse (!) 54   Temp 36.8 °C (98.3 °F)   Ht 1.702 m (5' 7\")   Wt 78 kg (172 lb)   SpO2 95%   BMI 26.94 kg/m²     Physical Exam:  Gen:         Alert and oriented, No apparent distress.  Neck:        No Lymphadenopathy or Bruits.  Lungs:     Clear to auscultation bilaterally  CV:          Regular rate and rhythm. No murmurs, rubs or gallops.  Abd:         Soft non tender, non distended. Normal active bowel sounds.  No  Hepatosplenomegaly, No pulsatile masses.                   Ext:          No clubbing, cyanosis, edema.      Assessment and Plan.   75 y.o. female with the following issues.    1. Medicare annual wellness visit, subsequent  Discussed healthy lifestyle habits as well as screening regimens.  Discussion about safe lifestyle practices, seatbelts, sunscreen, dietary recommendations.  - Subsequent Annual Wellness Visit - Includes PPPS ()    2. Other specified personal risk factors, not elsewhere classified  We will get a cardiac CT if normal no longer statins if elevated may consider alternative agents such as pravastatin that may be better tolerated.  - CT-CARDIAC SCORING; Future    3. Essential hypertension  Currently well controlled, Discuss diet, exercise and salt restriction.  No change to medication therapy.  - losartan (COZAAR) 50 MG Tab; Take 1 Tab by mouth every day. TAKE ONE TABLET BY MOUTH DAILY  Dispense: 100 Tab; Refill: 0    4. History of pulmonary embolism  Continue aspirin  - Subsequent Annual Wellness Visit - Includes PPPS ()    5. Stage 3 chronic kidney disease (HCC)  Avoidance of nephrotoxins repeat blood work  - Subsequent Annual Wellness Visit - Includes PPPS ()    6. Post menopausal problems    - DS-BONE DENSITY STUDY (DEXA); Future        Referrals offered: Community-based lifestyle interventions to reduce health risks and promote self-management and wellness, fall prevention, nutrition, physical activity, tobacco-use cessation, weight loss, and " mental health services as per orders if indicated.    Discussion today about general wellness and lifestyle habits:    · Prevent falls and reduce trip hazards; Cautioned about securing or removing rugs.  · Have a working fire alarm and carbon monoxide detector;   · Engage in regular physical activity and social activities

## 2020-09-28 ENCOUNTER — HOSPITAL ENCOUNTER (OUTPATIENT)
Dept: RADIOLOGY | Facility: MEDICAL CENTER | Age: 75
End: 2020-09-28
Attending: INTERNAL MEDICINE
Payer: MEDICARE

## 2020-09-28 DIAGNOSIS — N95.9 POST MENOPAUSAL PROBLEMS: ICD-10-CM

## 2020-09-28 PROCEDURE — 77080 DXA BONE DENSITY AXIAL: CPT

## 2020-09-30 ENCOUNTER — HOSPITAL ENCOUNTER (OUTPATIENT)
Dept: RADIOLOGY | Facility: MEDICAL CENTER | Age: 75
End: 2020-09-30
Attending: INTERNAL MEDICINE
Payer: COMMERCIAL

## 2020-09-30 DIAGNOSIS — Z91.89 OTHER SPECIFIED PERSONAL RISK FACTORS, NOT ELSEWHERE CLASSIFIED: ICD-10-CM

## 2020-09-30 PROCEDURE — 4410556 CT-CARDIAC SCORING

## 2020-10-06 DIAGNOSIS — Z71.89 CARDIAC RISK COUNSELING: ICD-10-CM

## 2020-10-06 DIAGNOSIS — I10 ESSENTIAL HYPERTENSION: ICD-10-CM

## 2020-10-06 RX ORDER — LOSARTAN POTASSIUM 50 MG/1
TABLET ORAL
Qty: 100 TAB | Refills: 1 | Status: SHIPPED | OUTPATIENT
Start: 2020-10-06 | End: 2021-06-17 | Stop reason: SDUPTHER

## 2020-10-06 RX ORDER — ROSUVASTATIN CALCIUM 5 MG/1
TABLET, COATED ORAL
Qty: 100 TAB | Refills: 1 | Status: SHIPPED | OUTPATIENT
Start: 2020-10-06 | End: 2020-12-18

## 2020-12-09 ENCOUNTER — TELEPHONE (OUTPATIENT)
Dept: MEDICAL GROUP | Facility: PHYSICIAN GROUP | Age: 75
End: 2020-12-09

## 2020-12-09 NOTE — TELEPHONE ENCOUNTER
ESTABLISHED PATIENT PRE-VISIT PLANNING     Patient was NOT contacted to complete PVP.    1.  Reviewed notes from the last few office visits within the medical group: Yes    2.  If any orders were placed at last visit or intended to be done for this visit (i.e. 6 mos follow-up), do we have Results/Consult Notes?        •  Labs - Labs ordered, completed on 10/1/20 and results are in chart.       •  Imaging - Imaging ordered, completed and results are in chart.       •  Referrals - No referrals were ordered at last office visit.    3. Is this appointment scheduled as a Hospital Follow-Up? No    4.  Immunizations were updated in Epic using WebIZ?:        •  Web Iz Recommendations:     5.  Patient is due for the following Health Maintenance Topics:   Health Maintenance Due   Topic Date Due   • IMM INFLUENZA (1) 09/01/2020           6. Orders for overdue Health Maintenance topics pended in Pre-Charting? NO    7.  AHA (MDX) form printed for Provider? YES    8.  Patient was NOT informed to arrive 15 min prior to their scheduled appointment and bring in their medication bottles.

## 2020-12-18 ENCOUNTER — OFFICE VISIT (OUTPATIENT)
Dept: MEDICAL GROUP | Facility: PHYSICIAN GROUP | Age: 75
End: 2020-12-18
Payer: MEDICARE

## 2020-12-18 VITALS
SYSTOLIC BLOOD PRESSURE: 122 MMHG | DIASTOLIC BLOOD PRESSURE: 68 MMHG | OXYGEN SATURATION: 96 % | BODY MASS INDEX: 28.09 KG/M2 | HEIGHT: 67 IN | HEART RATE: 76 BPM | TEMPERATURE: 97.9 F | WEIGHT: 179 LBS

## 2020-12-18 DIAGNOSIS — N18.31 STAGE 3A CHRONIC KIDNEY DISEASE: ICD-10-CM

## 2020-12-18 DIAGNOSIS — G62.9 NEUROPATHY: ICD-10-CM

## 2020-12-18 DIAGNOSIS — Z12.39 BREAST CANCER SCREENING OTHER THAN MAMMOGRAM: ICD-10-CM

## 2020-12-18 DIAGNOSIS — E78.00 ELEVATED CHOLESTEROL: ICD-10-CM

## 2020-12-18 DIAGNOSIS — R53.82 CHRONIC FATIGUE: ICD-10-CM

## 2020-12-18 DIAGNOSIS — I10 ESSENTIAL HYPERTENSION: ICD-10-CM

## 2020-12-18 DIAGNOSIS — E55.9 VITAMIN D DEFICIENCY: ICD-10-CM

## 2020-12-18 DIAGNOSIS — R01.1 MURMUR, CARDIAC: ICD-10-CM

## 2020-12-18 PROCEDURE — 99214 OFFICE O/P EST MOD 30 MIN: CPT | Performed by: NURSE PRACTITIONER

## 2020-12-18 SDOH — HEALTH STABILITY: MENTAL HEALTH: HOW MANY STANDARD DRINKS CONTAINING ALCOHOL DO YOU HAVE ON A TYPICAL DAY?: 1 OR 2

## 2020-12-18 SDOH — HEALTH STABILITY: MENTAL HEALTH: HOW OFTEN DO YOU HAVE A DRINK CONTAINING ALCOHOL?: 4 OR MORE TIMES A WEEK

## 2020-12-18 SDOH — HEALTH STABILITY: MENTAL HEALTH: HOW OFTEN DO YOU HAVE 6 OR MORE DRINKS ON ONE OCCASION?: NEVER

## 2020-12-18 NOTE — PROGRESS NOTES
"Chief Complaint   Patient presents with   • Establish Care         Subjective:     aKty Perez is a 75 y.o. female presenting to establish care.    Hypertension: chronic, controlled.  On losartan and tolerating well. Monitors BP at home and reports stable numbers;     Hypercholesterolemia / statin intolerance: chronic, uncontrolled.  Not taking statin.  Developed significant itchy rash with use.  Trialed time off and rash resolved.  When she tried restarting, the rash returned.    Chronic kidney disease: chronic, controlled.  GFR persistently in the fifties, mildly labile. Avoids NSAIDs    Review of systems:      Denies chest pain, shortness of breath, sore throat, difficulty swallowing, new cough, dizziness, severe headache, altered cognition, changes in bowel or bladder habits, decreased sensation, decreased strength, numbness or tingling, intolerable depression or anxiety, rash or skin concerns, changes in vision, painful or swollen lymph nodes.       Current Outpatient Medications:   •  losartan (COZAAR) 50 MG Tab, TAKE ONE TABLET BY MOUTH DAILY, Disp: 100 Tab, Rfl: 1  •  Diclofenac Sodium (VOLTAREN) 1 % Gel, Apply 2-4 g to skin as directed 4 times a day., Disp: 5 Tube, Rfl: 6    Allergies, past medical history, past surgical history, family history, social history reviewed and updated    Objective:     Vitals: /68 (BP Location: Right arm, Patient Position: Sitting, BP Cuff Size: Large adult)   Pulse 76   Temp 36.6 °C (97.9 °F) (Temporal)   Ht 1.702 m (5' 7\")   Wt 81.2 kg (179 lb)   SpO2 96%   BMI 28.04 kg/m²   General: Alert, cooperative, dressed appropriately for weather / situation  Eyes:Normocephalic.  EOMI, no icterus or pallor.  Conjunctivae clear without erythema / irritation.  ENT:  External ears developed; Bilat TMs visualized; appear pearly without bulging, effusion, or erythema; crisp light reflex    Lymph: Neck supple, absent of cervical or supraclavicular lymphadenopathy.  " Thyroid palpated, free of masses or goiter.   Heart: Regular rate and rhythm.  S1 and S2 normal.  No murmurs auscultated; no murmurs / bruits heard over bilateral carotids.  Bilateral radial pulses strong and equal.  Bilateral posterior tibial pulses strong and equal.  Respiratory: Normal respiratory effort.  Clear to auscultation bilaterally.  AP ratio 1:2   Abdomen: Non-distended;   Skin: Visible skin intact, dry, without rash.  Musculoskeletal: Gait is normal.  Bilateral  strength strong equal.  Moves extremities freely and equally bilaterally  Neuro:  AAOx3 Visual tracking intact, no nystagmus;   Psych:  Affect/mood is normal, judgement is good, memory is intact, grooming is appropriate.    Assessment/Plan:     Katy was seen today for establish care.    Diagnoses and all orders for this visit:    Essential hypertension  Comments:  continue losartan; monitor BP at home      Stage 3a chronic kidney disease  Comments:  Continue avoidance of nephrotoxins and maintian hydration  Orders:  -     CBC WITH DIFFERENTIAL; Future  -     Comp Metabolic Panel; Future    Elevated cholesterol  Comments:  Will recheck lipid panel to evaluate cholesterol off statin; continue avoidance of saturated fats.     Vitamin D deficiency  -     VITAMIN D,25 HYDROXY; Future    Chronic fatigue  -     CBC WITH DIFFERENTIAL; Future  -     Comp Metabolic Panel; Future  -     VITAMIN D,25 HYDROXY; Future  -     VITAMIN B12; Future    Neuropathy  -     CBC WITH DIFFERENTIAL; Future  -     Comp Metabolic Panel; Future  -     VITAMIN D,25 HYDROXY; Future  -     VITAMIN B12; Future    Breast cancer screening other than mammogram  -     US-SCREENING WHOLE BREAST BILATERAL (3D SCREENING); Future    Murmur, cardiac  -     EC-ECHOCARDIOGRAM COMPLETE W/O CONT; Future          Return in about 5 months (around 5/18/2021).    Patient verbalized understanding and agreed to plan of care.  Encouraged to contact me with needs via CureDMt or by phone if  needed.      I have placed the above orders and discussed them with an approved delegating provider.  The MA is performing the below orders under the direction of Dr Astudillo.    Please note that this dictation was created using voice recognition software. I have made every reasonable attempt to correct obvious errors, but I expect that there are errors of grammar and possibly content that I did not discover before finalizing the note.

## 2021-01-15 DIAGNOSIS — Z23 NEED FOR VACCINATION: ICD-10-CM

## 2021-02-02 ENCOUNTER — HOSPITAL ENCOUNTER (OUTPATIENT)
Dept: LAB | Facility: MEDICAL CENTER | Age: 76
End: 2021-02-02
Attending: NURSE PRACTITIONER
Payer: MEDICARE

## 2021-02-02 DIAGNOSIS — N18.31 STAGE 3A CHRONIC KIDNEY DISEASE: ICD-10-CM

## 2021-02-02 DIAGNOSIS — G62.9 NEUROPATHY: ICD-10-CM

## 2021-02-02 DIAGNOSIS — R53.82 CHRONIC FATIGUE: ICD-10-CM

## 2021-02-02 DIAGNOSIS — E55.9 VITAMIN D DEFICIENCY: ICD-10-CM

## 2021-02-02 LAB
ALBUMIN SERPL BCP-MCNC: 4.3 G/DL (ref 3.2–4.9)
ALBUMIN/GLOB SERPL: 1.8 G/DL
ALP SERPL-CCNC: 115 U/L (ref 30–99)
ALT SERPL-CCNC: 18 U/L (ref 2–50)
ANION GAP SERPL CALC-SCNC: 9 MMOL/L (ref 7–16)
AST SERPL-CCNC: 16 U/L (ref 12–45)
BASOPHILS # BLD AUTO: 1 % (ref 0–1.8)
BASOPHILS # BLD: 0.07 K/UL (ref 0–0.12)
BILIRUB SERPL-MCNC: 0.4 MG/DL (ref 0.1–1.5)
BUN SERPL-MCNC: 19 MG/DL (ref 8–22)
CALCIUM SERPL-MCNC: 10.6 MG/DL (ref 8.5–10.5)
CHLORIDE SERPL-SCNC: 106 MMOL/L (ref 96–112)
CO2 SERPL-SCNC: 25 MMOL/L (ref 20–33)
CREAT SERPL-MCNC: 1.1 MG/DL (ref 0.5–1.4)
EOSINOPHIL # BLD AUTO: 0.16 K/UL (ref 0–0.51)
EOSINOPHIL NFR BLD: 2.2 % (ref 0–6.9)
ERYTHROCYTE [DISTWIDTH] IN BLOOD BY AUTOMATED COUNT: 47.4 FL (ref 35.9–50)
GLOBULIN SER CALC-MCNC: 2.4 G/DL (ref 1.9–3.5)
GLUCOSE SERPL-MCNC: 81 MG/DL (ref 65–99)
HCT VFR BLD AUTO: 46 % (ref 37–47)
HGB BLD-MCNC: 14.9 G/DL (ref 12–16)
IMM GRANULOCYTES # BLD AUTO: 0.02 K/UL (ref 0–0.11)
IMM GRANULOCYTES NFR BLD AUTO: 0.3 % (ref 0–0.9)
LYMPHOCYTES # BLD AUTO: 1.71 K/UL (ref 1–4.8)
LYMPHOCYTES NFR BLD: 23.3 % (ref 22–41)
MCH RBC QN AUTO: 31.2 PG (ref 27–33)
MCHC RBC AUTO-ENTMCNC: 32.4 G/DL (ref 33.6–35)
MCV RBC AUTO: 96.2 FL (ref 81.4–97.8)
MONOCYTES # BLD AUTO: 0.53 K/UL (ref 0–0.85)
MONOCYTES NFR BLD AUTO: 7.2 % (ref 0–13.4)
NEUTROPHILS # BLD AUTO: 4.85 K/UL (ref 2–7.15)
NEUTROPHILS NFR BLD: 66 % (ref 44–72)
NRBC # BLD AUTO: 0 K/UL
NRBC BLD-RTO: 0 /100 WBC
PLATELET # BLD AUTO: 294 K/UL (ref 164–446)
PMV BLD AUTO: 11.1 FL (ref 9–12.9)
POTASSIUM SERPL-SCNC: 4 MMOL/L (ref 3.6–5.5)
PROT SERPL-MCNC: 6.7 G/DL (ref 6–8.2)
RBC # BLD AUTO: 4.78 M/UL (ref 4.2–5.4)
SODIUM SERPL-SCNC: 140 MMOL/L (ref 135–145)
WBC # BLD AUTO: 7.3 K/UL (ref 4.8–10.8)

## 2021-02-02 PROCEDURE — 36415 COLL VENOUS BLD VENIPUNCTURE: CPT

## 2021-02-02 PROCEDURE — 82306 VITAMIN D 25 HYDROXY: CPT

## 2021-02-02 PROCEDURE — 80053 COMPREHEN METABOLIC PANEL: CPT

## 2021-02-02 PROCEDURE — 82607 VITAMIN B-12: CPT

## 2021-02-02 PROCEDURE — 85025 COMPLETE CBC W/AUTO DIFF WBC: CPT

## 2021-02-03 LAB
25(OH)D3 SERPL-MCNC: 54 NG/ML (ref 30–100)
VIT B12 SERPL-MCNC: 584 PG/ML (ref 211–911)

## 2021-02-11 ENCOUNTER — HOSPITAL ENCOUNTER (OUTPATIENT)
Dept: CARDIOLOGY | Facility: MEDICAL CENTER | Age: 76
End: 2021-02-11
Attending: NURSE PRACTITIONER
Payer: MEDICARE

## 2021-02-11 DIAGNOSIS — R01.1 MURMUR, CARDIAC: ICD-10-CM

## 2021-02-11 LAB
LV EJECT FRACT  99904: 65
LV EJECT FRACT MOD 2C 99903: 68.79
LV EJECT FRACT MOD 4C 99902: 61.1
LV EJECT FRACT MOD BP 99901: 63.93

## 2021-02-11 PROCEDURE — 93306 TTE W/DOPPLER COMPLETE: CPT

## 2021-02-11 PROCEDURE — 93306 TTE W/DOPPLER COMPLETE: CPT | Mod: 26 | Performed by: INTERNAL MEDICINE

## 2021-02-23 ENCOUNTER — HOSPITAL ENCOUNTER (EMERGENCY)
Facility: MEDICAL CENTER | Age: 76
End: 2021-02-23
Attending: EMERGENCY MEDICINE
Payer: MEDICARE

## 2021-02-23 VITALS
HEIGHT: 67 IN | WEIGHT: 183.2 LBS | SYSTOLIC BLOOD PRESSURE: 169 MMHG | OXYGEN SATURATION: 96 % | RESPIRATION RATE: 15 BRPM | BODY MASS INDEX: 28.75 KG/M2 | DIASTOLIC BLOOD PRESSURE: 81 MMHG | TEMPERATURE: 98.1 F | HEART RATE: 64 BPM

## 2021-02-23 DIAGNOSIS — S01.81XA FACIAL LACERATION, INITIAL ENCOUNTER: ICD-10-CM

## 2021-02-23 DIAGNOSIS — S09.90XA CLOSED HEAD INJURY, INITIAL ENCOUNTER: ICD-10-CM

## 2021-02-23 PROCEDURE — 90715 TDAP VACCINE 7 YRS/> IM: CPT | Performed by: EMERGENCY MEDICINE

## 2021-02-23 PROCEDURE — 90471 IMMUNIZATION ADMIN: CPT

## 2021-02-23 PROCEDURE — 304217 HCHG IRRIGATION SYSTEM

## 2021-02-23 PROCEDURE — 304999 HCHG REPAIR-SIMPLE/INTERMED LEVEL 1

## 2021-02-23 PROCEDURE — 303353 HCHG DERMABOND SKIN ADHESIVE

## 2021-02-23 PROCEDURE — 700111 HCHG RX REV CODE 636 W/ 250 OVERRIDE (IP): Performed by: EMERGENCY MEDICINE

## 2021-02-23 PROCEDURE — 99284 EMERGENCY DEPT VISIT MOD MDM: CPT

## 2021-02-23 RX ORDER — VITAMIN B COMPLEX
1000 TABLET ORAL DAILY
Status: ON HOLD | COMMUNITY
End: 2021-11-17

## 2021-02-23 RX ADMIN — CLOSTRIDIUM TETANI TOXOID ANTIGEN (FORMALDEHYDE INACTIVATED), CORYNEBACTERIUM DIPHTHERIAE TOXOID ANTIGEN (FORMALDEHYDE INACTIVATED), BORDETELLA PERTUSSIS TOXOID ANTIGEN (GLUTARALDEHYDE INACTIVATED), BORDETELLA PERTUSSIS FILAMENTOUS HEMAGGLUTININ ANTIGEN (FORMALDEHYDE INACTIVATED), BORDETELLA PERTUSSIS PERTACTIN ANTIGEN, AND BORDETELLA PERTUSSIS FIMBRIAE 2/3 ANTIGEN 0.5 ML: 5; 2; 2.5; 5; 3; 5 INJECTION, SUSPENSION INTRAMUSCULAR at 15:44

## 2021-02-23 ASSESSMENT — FIBROSIS 4 INDEX: FIB4 SCORE: 0.94

## 2021-02-23 ASSESSMENT — LIFESTYLE VARIABLES: DO YOU DRINK ALCOHOL: NO

## 2021-02-23 NOTE — ED PROVIDER NOTES
ED Provider  Scribed for Deepak Booker D.O. by Jj Hodge. 2/23/2021  3:07 PM    Means of arrival: Walk-in  History obtained from: Patient  History limited by: None    CHIEF COMPLAINT  Chief Complaint   Patient presents with   • Head Injury     pt hit with 2x4 wood off bale of hay from inside barn       [facility]  Katy Perez is a 75 y.o. female who presents to the ED for evaluation of a head injury sustained from being hit in the head with a 2x8 plank of particle board about an hour and a half ago. She states she keeps the board handing from the ceiling of her barn to separate animals from the hay edmundo. She reports she was moving a bale of hay in her barn and knocked the board from the back which swung and hit her in the head. She states she remembers the whole event and does not report any loss of consciousness. She denies any nausea, vomiting, vision changes, numbness, tingling, or weakness. She does not know if her tetanus is UTD.    REVIEW OF SYSTEMS  See [facility] for further details.     PAST MEDICAL HISTORY   has a past medical history of Arthritis (5/16/2012), Hypertension, MEDICAL HOME (11/8/12), and Pain.    SOCIAL HISTORY  Social History     Tobacco Use   • Smoking status: Never Smoker   • Smokeless tobacco: Never Used   Substance and Sexual Activity   • Alcohol use: Yes     Alcohol/week: 4.2 - 6.0 oz     Types: 7 - 10 Glasses of wine per week   • Drug use: Yes     Frequency: 1.0 times per week     Types: Marijuana     Comment: smokes couple times a week    • Sexual activity: Not Currently     Partners: Male       SURGICAL HISTORY   has a past surgical history that includes breast augmentation with implant; gyn surgery (1977); bunionectomy drew (Right, 1/10/2017); hammertoe correction (Right, 1/10/2017); other (1970); knee arthroplasty total (11/5/2013); and other orthopedic surgery (2017).    CURRENT MEDICATIONS  Home Medications     Reviewed by Debra Dawson R.N. (Registered Nurse) on  "02/23/21 at 1459  Med List Status: Partial   Medication Last Dose Status   Diclofenac Sodium (VOLTAREN) 1 % Gel  Active   losartan (COZAAR) 50 MG Tab  Active   vitamin D (CHOLECALCIFEROL) 1000 Unit (25 mcg) Tab  Active                ALLERGIES  Allergies   Allergen Reactions   • Pcn [Penicillins] Rash   • Statins [Hmg-Coa-R Inhibitors]        PHYSICAL EXAM  VITAL SIGNS: BP (!) 176/94   Pulse 88   Temp 36.7 °C (98.1 °F) (Temporal)   Resp 14   Ht 1.702 m (5' 7\") Comment: Simultaneous filing. User may not have seen previous data.  Wt 83.1 kg (183 lb 3.2 oz)   SpO2 100%   BMI 28.69 kg/m²   Constitutional: Alert in no apparent distress.  HENT: Right face has linear abrasion/skin tear, no suturable area. No tenderness or swelling consistent with fracture. Mucous membranes are moist  Eyes: Conjunctiva normal, Non-icteric.   Neck: Normal range of motion, No tenderness, Supple.  Lymphatic: No lymphadenopathy noted.   Cardiovascular: Regular rate and rhythm, no murmurs.   Thorax & Lungs: Normal breath sounds, No respiratory distress, No wheezing, No chest tenderness.   Abdomen: Bowel sounds normal, Soft, No tenderness, No masses, No pulsatile masses. No peritoneal signs.  Skin: Warm, Dry, normal color.   Back: No bony tenderness, No CVA tenderness.   Extremities: No edema, No tenderness, No cyanosis  Musculoskeletal: Good range of motion in all major joints. No tenderness to palpation or major deformities noted.   Neurologic: Alert and oriented x4, Normal motor function, Normal sensory function, No focal deficits noted. Cranial nerves II-XII intact, speech clear and concise, strength and coordination in extremities normal.  Psychiatric: Affect normal, Judgment normal, Mood normal.     COURSE  Pertinent Labs & Imaging studies reviewed. (See chart for details)    3:07 PM - Patient seen and examined at bedside. I informed her that I am not concerned for a fracture and will not need to order a CT scan of her head. " Discussed plan of care. The patient will be medicated with Adacel 0.5 mL injection.    3:39 PM - Patient reevaluated at bedside. Patient's wound cleaned with saline. Skin edges re-approximated and glued with Dermabond.    Procedure note:    Wound closure    Skin tear laceration facial 3 cm    Cleaned with saline, closed with Dermabond        MEDICAL DECISION MAKING  This is a 75 y.o. female who presents with superficial laceration to the right face, mostly this is a skin tear/abrasion.  There is one area that can be glued, otherwise there is no indication for sutures.  There is no significant tenderness of be considered 4 fracture.  She did not lose consciousness she has had no nausea no vomiting after GCS is 15.  There is no indication for CT scan of the head or face.     The patient will return for new or worsening symptoms and is stable at the time of discharge.    DISPOSITION:  Patient will be discharged home in stable condition.    FOLLOW UP:  GIORGIO PhillipP.R.N.  [address] [address]  [phone]    In 1 week        FINAL IMPRESSION  1. Closed head injury, initial encounter    2. Facial laceration, initial encounter         jJ DRUMMOND (Scribe), am scribing for, and in the presence of, Deepak Booker D.O..    Electronically signed by: Jj Hodge (Scribe), 2/23/2021    Deepak DRUMMOND D.O. personally performed the services described in this documentation, as scribed by Jj Hodge in my presence, and it is both accurate and complete.    E.    The note accurately reflects work and decisions made by me.  Deepak Booker D.O.  2/23/2021  8:27 PM

## 2021-02-23 NOTE — ED NOTES
Pt ambulatory with steady gait to BL 16, pt in gown and on monitor, call light in reach, chart up for ERP.

## 2021-02-23 NOTE — ED NOTES
"Pt discharged home via ambulatory to lobby with steady gait, AOx4, family accompanying. Pt in possession of belongings. Pt provided discharge education and information pertaining to medications and follow up appointments. Pt received copy of discharge instructions and verbalized understanding.     BP (!) 169/81   Pulse 64   Temp 36.7 °C (98.1 °F) (Temporal)   Resp 15   Ht 1.702 m (5' 7\") Comment: Simultaneous filing. User may not have seen previous data.  Wt 83.1 kg (183 lb 3.2 oz)   SpO2 96%   BMI 28.69 kg/m²   "

## 2021-06-09 ENCOUNTER — TELEPHONE (OUTPATIENT)
Dept: MEDICAL GROUP | Facility: PHYSICIAN GROUP | Age: 76
End: 2021-06-09

## 2021-06-09 NOTE — TELEPHONE ENCOUNTER
ESTABLISHED PATIENT PRE-VISIT PLANNING     Patient was NOT contacted to complete PVP.     Note: Patient will not be contacted if there is no indication to call.     1.  Reviewed notes from the last few office visits within the medical group: Yes    2.  If any orders were placed at last visit or intended to be done for this visit (i.e. 6 mos follow-up), do we have Results/Consult Notes?         •  Labs - Labs ordered, completed on 6/2/21 and results are in chart.  Note: If patient appointment is for lab review and patient did not complete labs, check with provider if OK to reschedule patient until labs completed.       •  Imaging - Imaging ordered, completed and results are in chart.       •  Referrals - No referrals were ordered at last office visit.    3. Is this appointment scheduled as a Hospital Follow-Up? No    4.  Immunizations were updated in Epic using Reconcile Outside Information activity? Yes, NOTHING TO RECONCILE     5.  Patient is due for the following Health Maintenance Topics:   There are no preventive care reminders to display for this patient.    6.  AHA (Pulse8) form printed for Provider? Yes

## 2021-06-17 ENCOUNTER — OFFICE VISIT (OUTPATIENT)
Dept: MEDICAL GROUP | Facility: PHYSICIAN GROUP | Age: 76
End: 2021-06-17
Payer: MEDICARE

## 2021-06-17 ENCOUNTER — HOSPITAL ENCOUNTER (OUTPATIENT)
Dept: LAB | Facility: MEDICAL CENTER | Age: 76
End: 2021-06-17
Attending: NURSE PRACTITIONER
Payer: MEDICARE

## 2021-06-17 VITALS
BODY MASS INDEX: 28.25 KG/M2 | DIASTOLIC BLOOD PRESSURE: 66 MMHG | TEMPERATURE: 97.8 F | OXYGEN SATURATION: 98 % | WEIGHT: 180 LBS | SYSTOLIC BLOOD PRESSURE: 120 MMHG | HEIGHT: 67 IN | HEART RATE: 68 BPM

## 2021-06-17 DIAGNOSIS — E83.52 HYPERCALCEMIA: ICD-10-CM

## 2021-06-17 DIAGNOSIS — I10 ESSENTIAL HYPERTENSION: ICD-10-CM

## 2021-06-17 DIAGNOSIS — Z86.711 HISTORY OF PULMONARY EMBOLISM: ICD-10-CM

## 2021-06-17 DIAGNOSIS — N18.31 STAGE 3A CHRONIC KIDNEY DISEASE: ICD-10-CM

## 2021-06-17 DIAGNOSIS — I70.0 AORTIC ATHEROSCLEROSIS (HCC): ICD-10-CM

## 2021-06-17 PROBLEM — M21.611 BUNION OF GREAT TOE OF RIGHT FOOT: Status: RESOLVED | Noted: 2017-01-10 | Resolved: 2021-06-17

## 2021-06-17 PROBLEM — Z71.89 CARDIAC RISK COUNSELING: Status: RESOLVED | Noted: 2019-05-30 | Resolved: 2021-06-17

## 2021-06-17 LAB
ALBUMIN SERPL BCP-MCNC: 4.3 G/DL (ref 3.2–4.9)
ALBUMIN/GLOB SERPL: 1.8 G/DL
ALP SERPL-CCNC: 112 U/L (ref 30–99)
ALT SERPL-CCNC: 19 U/L (ref 2–50)
ANION GAP SERPL CALC-SCNC: 11 MMOL/L (ref 7–16)
AST SERPL-CCNC: 18 U/L (ref 12–45)
BILIRUB SERPL-MCNC: 0.3 MG/DL (ref 0.1–1.5)
BUN SERPL-MCNC: 25 MG/DL (ref 8–22)
CA-I SERPL-SCNC: 1.4 MMOL/L (ref 1.1–1.3)
CALCIUM SERPL-MCNC: 10.4 MG/DL (ref 8.5–10.5)
CHLORIDE SERPL-SCNC: 107 MMOL/L (ref 96–112)
CO2 SERPL-SCNC: 24 MMOL/L (ref 20–33)
CREAT SERPL-MCNC: 0.95 MG/DL (ref 0.5–1.4)
FASTING STATUS PATIENT QL REPORTED: NORMAL
GLOBULIN SER CALC-MCNC: 2.4 G/DL (ref 1.9–3.5)
GLUCOSE SERPL-MCNC: 92 MG/DL (ref 65–99)
POTASSIUM SERPL-SCNC: 5.2 MMOL/L (ref 3.6–5.5)
PROT SERPL-MCNC: 6.7 G/DL (ref 6–8.2)
PTH-INTACT SERPL-MCNC: 78.7 PG/ML (ref 14–72)
SODIUM SERPL-SCNC: 142 MMOL/L (ref 135–145)

## 2021-06-17 PROCEDURE — 80053 COMPREHEN METABOLIC PANEL: CPT

## 2021-06-17 PROCEDURE — 83970 ASSAY OF PARATHORMONE: CPT

## 2021-06-17 PROCEDURE — 82330 ASSAY OF CALCIUM: CPT

## 2021-06-17 PROCEDURE — 36415 COLL VENOUS BLD VENIPUNCTURE: CPT

## 2021-06-17 PROCEDURE — 99214 OFFICE O/P EST MOD 30 MIN: CPT | Performed by: NURSE PRACTITIONER

## 2021-06-17 RX ORDER — LOSARTAN POTASSIUM 50 MG/1
50 TABLET ORAL DAILY
Qty: 100 TABLET | Refills: 3 | Status: SHIPPED | OUTPATIENT
Start: 2021-06-17 | End: 2022-08-25 | Stop reason: SDUPTHER

## 2021-06-17 ASSESSMENT — ACTIVITIES OF DAILY LIVING (ADL): BATHING_REQUIRES_ASSISTANCE: 0

## 2021-06-17 ASSESSMENT — PATIENT HEALTH QUESTIONNAIRE - PHQ9: CLINICAL INTERPRETATION OF PHQ2 SCORE: 0

## 2021-06-17 ASSESSMENT — FIBROSIS 4 INDEX: FIB4 SCORE: 0.95

## 2021-06-17 ASSESSMENT — ENCOUNTER SYMPTOMS: GENERAL WELL-BEING: GOOD

## 2021-06-17 NOTE — PROGRESS NOTES
HPI:  Katy is a 76 y.o. here for Medicare Annual Wellness Visit    Overall she is doing quite well.  Did have mild hypercalcemia and continued chronic kidney disease on last set of labs in February.  Due to be rechecked along with parathyroid hormone.    Did go to the emergency department after an accident occurred where she was hit in the right side of her head by a sheet of particle board.  Fortunately, she is recovered quite well from this without any residual effects.    Patient Active Problem List    Diagnosis Date Noted   • Stage 3 chronic kidney disease (HCC) 05/30/2019   • History of pulmonary embolism 05/22/2018   • Essential hypertension 05/02/2016       Current Outpatient Medications   Medication Sig Dispense Refill   • losartan (COZAAR) 50 MG Tab Take 1 tablet by mouth every day. 100 tablet 3   • vitamin D (CHOLECALCIFEROL) 1000 Unit (25 mcg) Tab Take 1,000 Units by mouth every day.       No current facility-administered medications for this visit.        Patient is taking medications as noted in medication list.  Current supplements as per medication list.     Allergies: Pcn [penicillins] and Statins [hmg-coa-r inhibitors]    Current social contact/activities: Not at the moment because of Covid-19     Is patient current with immunizations? Yes.    She  reports that she has never smoked. She has never used smokeless tobacco. She reports current alcohol use of about 4.2 - 6.0 oz of alcohol per week. She reports current drug use. Frequency: 1.00 time per week. Drug: Marijuana.  Counseling given: Not Answered        DPA/Advanced directive: Patient has Advanced Directive on file.     ROS:    Gait: Uses no assistive device   Ostomy: No   Other tubes: No   Amputations: No   Chronic oxygen use No   Last eye exam 2021   Wears hearing aids: No   : Denies any urinary leakage during the last 6 months      Screening:        Depression Screening    Little interest or pleasure in doing things?  0 - not at  all  Feeling down, depressed, or hopeless? 0 - not at all  Patient Health Questionnaire Score: 0    If depressive symptoms identified deferred to follow up visit unless specifically addressed in assessment and plan.    Interpretation of PHQ-9 Total Score   Score Severity   1-4 No Depression   5-9 Mild Depression   10-14 Moderate Depression   15-19 Moderately Severe Depression   20-27 Severe Depression    Screening for Cognitive Impairment    Three Minute Recall (captain, garden, picture)  3/3    Hari clock face with all 12 numbers and set the hands to show 5 past 8.  Yes    If cognitive concerns identified, deferred for follow up unless specifically addressed in assessment and plan.    Fall Risk Assessment    Has the patient had two or more falls in the last year or any fall with injury in the last year?  No  If fall risk identified, deferred for follow up unless specifically addressed in assessment and plan.    Safety Assessment    Throw rugs on floor.  No  Handrails on all stairs.  Yes  Good lighting in all hallways.  Yes  Difficulty hearing.  No  Patient counseled about all safety risks that were identified.    Functional Assessment ADLs    Are there any barriers preventing you from cooking for yourself or meeting nutritional needs?  No.    Are there any barriers preventing you from driving safely or obtaining transportation?  No.    Are there any barriers preventing you from using a telephone or calling for help?  No.    Are there any barriers preventing you from shopping?  No.    Are there any barriers preventing you from taking care of your own finances?  No.    Are there any barriers preventing you from managing your medications?  No.    Are there any barriers preventing you from showering, bathing or dressing yourself?  No.    Are you currently engaging in any exercise or physical activity?  Yes.     What is your perception of your health?  Good.    Health Maintenance Summary                IMM ZOSTER  VACCINES Postponed 6/12/2036 Originally 5/11/1997. Insurance/Financial    Annual Wellness Visit Next Due 9/15/2021      Done 9/14/2020 SUBSEQUENT ANNUAL WELLNESS VISIT-INCLUDES PPPS ()     Patient has more history with this topic...    COLONOSCOPY Next Due 6/11/2022      Done 6/11/2012 REFERRAL TO GI FOR COLONOSCOPY     Patient has more history with this topic...    BONE DENSITY Next Due 9/28/2025      Done 9/28/2020 DS-BONE DENSITY STUDY (DEXA)     Patient has more history with this topic...    IMM DTaP/Tdap/Td Vaccine Next Due 2/23/2031      Done 2/23/2021 Imm Admin: Tdap Vaccine     Patient has more history with this topic...          Patient Care Team:  KILEY Phillip as PCP - General (Nurse Practitioner Family)  Charlotte Moore as      Social History     Tobacco Use   • Smoking status: Never Smoker   • Smokeless tobacco: Never Used   Vaping Use   • Vaping Use: Never used   Substance Use Topics   • Alcohol use: Yes     Alcohol/week: 4.2 - 6.0 oz     Types: 7 - 10 Glasses of wine per week   • Drug use: Yes     Frequency: 1.0 times per week     Types: Marijuana     Comment: smokes couple times a week      Family History   Problem Relation Age of Onset   • Hypertension Father    • Heart Attack Father    • Heart Failure Mother    • Breast Cancer Maternal Grandmother    • No Known Problems Sister    • Heart Attack Maternal Grandfather    • No Known Problems Paternal Grandmother    • No Known Problems Paternal Grandfather      She  has a past medical history of Arthritis (5/16/2012), Hypertension, MEDICAL HOME (11/8/12), and Pain. She also has no past medical history of Breast cancer (HCC) or Clotting disorder (HCC).   Past Surgical History:   Procedure Laterality Date   • BUNIONECTOMY DANK Right 1/10/2017    Procedure: BUNIONECTOMY DANK 2ND;  Surgeon: SHARMILA SmithPFiliMFili;  Location: SURGERY [facility];  Service:    • HAMMERTOE CORRECTION Right  "1/10/2017    Procedure: HAMMERTOE CORRECTION;  Surgeon: Ten Burton D.P.M.;  Location: SURGERY [facility];  Service:    • OTHER ORTHOPEDIC SURGERY  2017    bunion   • KNEE ARTHROPLASTY TOTAL  11/5/2013    Performed by Luis Faulkner M.D. at SURGERY [facility]S   • GYN SURGERY  1977    tubal   • OTHER  1970    breast implants [facility]   • [place] BREAST AUGMENTATION WITH IMPLANT             Exam:     /66 (BP Location: Right arm, Patient Position: Sitting, BP Cuff Size: Large adult)   Pulse 68   Temp 36.6 °C (97.8 °F) (Temporal)   Ht 1.702 m (5' 7\")   Wt 81.6 kg (180 lb)   SpO2 98%  Body mass index is 28.19 kg/m².    Hearing excellent.    Dentition good  Alert, oriented in no acute distress.  Eye contact is good, speech goal directed, affect calm      Assessment and Plan. The following treatment and monitoring plan is recommended:    1. Stage 3a chronic kidney disease (HCC)  PTH INTACT    IONIZED CALCIUM    Comp Metabolic Panel    CANCELED: Comp Metabolic Panel    Continued hydration and avoidance of nephrotoxins   2. Aortic atherosclerosis (HCC)      Continue healthy diet and lifestyle, reduce saturated fat intake and control blood pressure when possible   3. Essential hypertension  losartan (COZAAR) 50 MG Tab    Well-controlled, continue losartan daily   4. Hypercalcemia  PTH INTACT    IONIZED CALCIUM    Comp Metabolic Panel    CANCELED: Comp Metabolic Panel    Recheck indicated along with parathyroid hormone and ionized calcium.   5. History of pulmonary embolism           Services suggested: No services needed at this time  Health Care Screening recommendations as per orders if indicated.  Referrals offered: PT/OT/Nutrition counseling/Behavioral Health/Smoking cessation as per orders if indicated.    Discussion today about general wellness and lifestyle habits:    · Prevent falls and reduce trip hazards; Cautioned about securing or removing rugs.  · Have a working fire alarm and carbon monoxide " detector;   · Engage in regular physical activity and social activities       Follow-up: Return in about 6 months (around 12/17/2021).

## 2021-06-24 DIAGNOSIS — E21.3 HYPERPARATHYROIDISM (HCC): ICD-10-CM

## 2021-06-24 DIAGNOSIS — N18.31 STAGE 3A CHRONIC KIDNEY DISEASE: ICD-10-CM

## 2021-06-24 DIAGNOSIS — E83.52 HYPERCALCEMIA: ICD-10-CM

## 2021-08-10 ENCOUNTER — PATIENT MESSAGE (OUTPATIENT)
Dept: HEALTH INFORMATION MANAGEMENT | Facility: OTHER | Age: 76
End: 2021-08-10

## 2021-08-17 ENCOUNTER — TELEPHONE (OUTPATIENT)
Dept: MEDICAL GROUP | Facility: PHYSICIAN GROUP | Age: 76
End: 2021-08-17

## 2021-08-17 NOTE — TELEPHONE ENCOUNTER
ESTABLISHED PATIENT PRE-VISIT PLANNING     Patient was contacted to complete PVP.     Note: Patient will not be contacted if there is no indication to call.     1.  Reviewed notes from the last few office visits within the medical group: Yes    2.  If any orders were placed at last visit or intended to be done for this visit (i.e. 6 mos follow-up), do we have Results/Consult Notes?         •  Labs - Labs ordered, completed on 6/17/21 and results are in chart.  Note: If patient appointment is for lab review and patient did not complete labs, check with provider if OK to reschedule patient until labs completed.       •  Imaging - Imaging was not ordered at last office visit.       •  Referrals - Referral ordered, patient has NOT been seen.Pateint is scheduled for future appointment with Nephrology    3. Is this appointment scheduled as a Hospital Follow-Up? No    4.  Immunizations were updated in Epic using Reconcile Outside Information activity? Yes    5.  Patient is due for the following Health Maintenance Topics:   There are no preventive care reminders to display for this patient.      6.  AHA (Pulse8) form printed for Provider? Yes

## 2021-08-24 ENCOUNTER — OFFICE VISIT (OUTPATIENT)
Dept: MEDICAL GROUP | Facility: PHYSICIAN GROUP | Age: 76
End: 2021-08-24
Payer: MEDICARE

## 2021-08-24 VITALS
OXYGEN SATURATION: 97 % | HEIGHT: 67 IN | WEIGHT: 180 LBS | DIASTOLIC BLOOD PRESSURE: 60 MMHG | TEMPERATURE: 98.1 F | SYSTOLIC BLOOD PRESSURE: 122 MMHG | HEART RATE: 60 BPM | BODY MASS INDEX: 28.25 KG/M2

## 2021-08-24 DIAGNOSIS — I10 ESSENTIAL HYPERTENSION: ICD-10-CM

## 2021-08-24 DIAGNOSIS — E21.3 HYPERPARATHYROIDISM (HCC): ICD-10-CM

## 2021-08-24 DIAGNOSIS — M85.89 OSTEOPENIA OF MULTIPLE SITES: ICD-10-CM

## 2021-08-24 DIAGNOSIS — N18.31 STAGE 3A CHRONIC KIDNEY DISEASE: ICD-10-CM

## 2021-08-24 DIAGNOSIS — R74.8 ELEVATED ALKALINE PHOSPHATASE LEVEL: ICD-10-CM

## 2021-08-24 DIAGNOSIS — E78.5 HYPERLIPIDEMIA, UNSPECIFIED HYPERLIPIDEMIA TYPE: ICD-10-CM

## 2021-08-24 DIAGNOSIS — E83.52 HYPERCALCEMIA: ICD-10-CM

## 2021-08-24 DIAGNOSIS — Z12.39 ENCOUNTER FOR BREAST CANCER SCREENING USING NON-MAMMOGRAM MODALITY: ICD-10-CM

## 2021-08-24 DIAGNOSIS — I70.0 AORTIC ATHEROSCLEROSIS (HCC): ICD-10-CM

## 2021-08-24 PROCEDURE — 99215 OFFICE O/P EST HI 40 MIN: CPT | Performed by: STUDENT IN AN ORGANIZED HEALTH CARE EDUCATION/TRAINING PROGRAM

## 2021-08-24 SDOH — ECONOMIC STABILITY: HOUSING INSECURITY
IN THE LAST 12 MONTHS, WAS THERE A TIME WHEN YOU DID NOT HAVE A STEADY PLACE TO SLEEP OR SLEPT IN A SHELTER (INCLUDING NOW)?: NO

## 2021-08-24 SDOH — ECONOMIC STABILITY: FOOD INSECURITY: WITHIN THE PAST 12 MONTHS, YOU WORRIED THAT YOUR FOOD WOULD RUN OUT BEFORE YOU GOT MONEY TO BUY MORE.: NEVER TRUE

## 2021-08-24 SDOH — ECONOMIC STABILITY: HOUSING INSECURITY: IN THE LAST 12 MONTHS, HOW MANY PLACES HAVE YOU LIVED?: 1

## 2021-08-24 SDOH — HEALTH STABILITY: PHYSICAL HEALTH: ON AVERAGE, HOW MANY DAYS PER WEEK DO YOU ENGAGE IN MODERATE TO STRENUOUS EXERCISE (LIKE A BRISK WALK)?: 7 DAYS

## 2021-08-24 SDOH — HEALTH STABILITY: PHYSICAL HEALTH: ON AVERAGE, HOW MANY MINUTES DO YOU ENGAGE IN EXERCISE AT THIS LEVEL?: 120 MIN

## 2021-08-24 SDOH — HEALTH STABILITY: MENTAL HEALTH
STRESS IS WHEN SOMEONE FEELS TENSE, NERVOUS, ANXIOUS, OR CAN'T SLEEP AT NIGHT BECAUSE THEIR MIND IS TROUBLED. HOW STRESSED ARE YOU?: ONLY A LITTLE

## 2021-08-24 SDOH — ECONOMIC STABILITY: FOOD INSECURITY: WITHIN THE PAST 12 MONTHS, THE FOOD YOU BOUGHT JUST DIDN'T LAST AND YOU DIDN'T HAVE MONEY TO GET MORE.: NEVER TRUE

## 2021-08-24 SDOH — ECONOMIC STABILITY: INCOME INSECURITY: IN THE LAST 12 MONTHS, WAS THERE A TIME WHEN YOU WERE NOT ABLE TO PAY THE MORTGAGE OR RENT ON TIME?: NO

## 2021-08-24 SDOH — ECONOMIC STABILITY: INCOME INSECURITY: HOW HARD IS IT FOR YOU TO PAY FOR THE VERY BASICS LIKE FOOD, HOUSING, MEDICAL CARE, AND HEATING?: NOT HARD AT ALL

## 2021-08-24 SDOH — ECONOMIC STABILITY: TRANSPORTATION INSECURITY
IN THE PAST 12 MONTHS, HAS LACK OF RELIABLE TRANSPORTATION KEPT YOU FROM MEDICAL APPOINTMENTS, MEETINGS, WORK OR FROM GETTING THINGS NEEDED FOR DAILY LIVING?: NO

## 2021-08-24 ASSESSMENT — LIFESTYLE VARIABLES
HOW OFTEN DO YOU HAVE SIX OR MORE DRINKS ON ONE OCCASION: NEVER
HOW MANY STANDARD DRINKS CONTAINING ALCOHOL DO YOU HAVE ON A TYPICAL DAY: 1 OR 2
HOW OFTEN DO YOU HAVE A DRINK CONTAINING ALCOHOL: 4 OR MORE TIMES A WEEK

## 2021-08-24 ASSESSMENT — SOCIAL DETERMINANTS OF HEALTH (SDOH)
HOW OFTEN DO YOU HAVE SIX OR MORE DRINKS ON ONE OCCASION: NEVER
IN A TYPICAL WEEK, HOW MANY TIMES DO YOU TALK ON THE PHONE WITH FAMILY, FRIENDS, OR NEIGHBORS?: ONCE A WEEK
HOW OFTEN DO YOU GET TOGETHER WITH FRIENDS OR RELATIVES?: NEVER
HOW OFTEN DO YOU ATTEND CHURCH OR RELIGIOUS SERVICES?: NEVER
HOW OFTEN DO YOU GET TOGETHER WITH FRIENDS OR RELATIVES?: NEVER
HOW OFTEN DO YOU ATTENT MEETINGS OF THE CLUB OR ORGANIZATION YOU BELONG TO?: MORE THAN 4 TIMES PER YEAR
HOW MANY DRINKS CONTAINING ALCOHOL DO YOU HAVE ON A TYPICAL DAY WHEN YOU ARE DRINKING: 1 OR 2
DO YOU BELONG TO ANY CLUBS OR ORGANIZATIONS SUCH AS CHURCH GROUPS UNIONS, FRATERNAL OR ATHLETIC GROUPS, OR SCHOOL GROUPS?: YES
DO YOU BELONG TO ANY CLUBS OR ORGANIZATIONS SUCH AS CHURCH GROUPS UNIONS, FRATERNAL OR ATHLETIC GROUPS, OR SCHOOL GROUPS?: YES
HOW HARD IS IT FOR YOU TO PAY FOR THE VERY BASICS LIKE FOOD, HOUSING, MEDICAL CARE, AND HEATING?: NOT HARD AT ALL
HOW OFTEN DO YOU HAVE A DRINK CONTAINING ALCOHOL: 4 OR MORE TIMES A WEEK
HOW OFTEN DO YOU ATTENT MEETINGS OF THE CLUB OR ORGANIZATION YOU BELONG TO?: MORE THAN 4 TIMES PER YEAR
WITHIN THE PAST 12 MONTHS, YOU WORRIED THAT YOUR FOOD WOULD RUN OUT BEFORE YOU GOT THE MONEY TO BUY MORE: NEVER TRUE
HOW OFTEN DO YOU ATTEND CHURCH OR RELIGIOUS SERVICES?: NEVER
IN A TYPICAL WEEK, HOW MANY TIMES DO YOU TALK ON THE PHONE WITH FAMILY, FRIENDS, OR NEIGHBORS?: ONCE A WEEK

## 2021-08-24 ASSESSMENT — FIBROSIS 4 INDEX: FIB4 SCORE: 1.04

## 2021-08-24 NOTE — PROGRESS NOTES
"Subjective:     CC: establish care    HISTORY OF THE PRESENT ILLNESS: Patient is a 76 y.o. female. This pleasant patient is here today to establish care and discuss upcoming nephrology appointment. His/her prior PCP was CADENCE Urbina.    She has a history of chronic hypertension well controlled with 50 mg of Cozaar daily.  Denies side effects of medication.  She has a appointment with the nephrologist tomorrow given her chronic kidney disease as well as new hypercalcemia with elevated PTH.  She has a history of vitamin D deficiency which was resolved earlier this year.  Also has a history of osteopenia, not a criteria for bisphosphonate.  She denies any constipation, muscle aches, mood symptoms or confusion or palpitations.    She was started on a statin given mildly elevated LDL as well as a coronary calcium score of 25.7 in 2020.  She developed a rash with a statin which resolved when it was stopped.  She has not taken baby aspirin.  She denies a history of heart attack or stroke.    Current Outpatient Medications Ordered in Epic   Medication Sig Dispense Refill   • losartan (COZAAR) 50 MG Tab Take 1 tablet by mouth every day. 100 tablet 3   • vitamin D (CHOLECALCIFEROL) 1000 Unit (25 mcg) Tab Take 1,000 Units by mouth every day.       No current [facility]-ordered facility-administered medications on file.     Health maintenance: Reviewed with patient, will consider Shingrix-we will let me know if she decides.    ROS:   See HPI      Objective:     Exam: /60 (BP Location: Left arm, Patient Position: Sitting, BP Cuff Size: Adult)   Pulse 60   Temp 36.7 °C (98.1 °F) (Temporal)   Ht 1.702 m (5' 7\")   Wt 81.6 kg (180 lb)   SpO2 97%  Body mass index is 28.19 kg/m².    General: Well developed, well nourished in no acute distress.  Head: Normocephalic and atraumatic.  Eyes: Conjunctivae and extraocular motions are normal. Pupils are equal, round. No scleral icterus.   Mouth/Throat: Wearing mask grossly  Neck: Supple. " Thyroid is not enlarged.  Pulmonary: Clear to ausculation.  Normal effort. No rales, ronchi, or wheezing.  Cardiovascular: Regular rate and rhythm without murmur.  Abdomen: Soft, nontender, nondistended. Normal bowel sounds. No HSM.  Neurologic: No gross/focal deficits. Normal gait.   Lymph: No cervical or supraclavicular lymph nodes are palpable  Skin: Warm and dry.  No obvious lesions.  Musculoskeletal: No extremity cyanosis, clubbing, or edema.  Psych: Normal mood and affect. Alert and oriented x3. Judgment and insight is normal.    Labs: Reviewed from 2/2/2021, 6/7/2021    Assessment & Plan:   76 y.o. female with the following -    1. Stage 3a chronic kidney disease (HCC)  This is a chronic condition, blood pressure very well controlled today on ARB.  Continue current regimen.  She has an appointment with nephrology tomorrow which I encouraged her to attend.    2. Essential hypertension  This is a chronic condition, well-controlled, continue losartan 50 mg daily.  - Comp Metabolic Panel; Future    3. Hyperlipidemia, unspecified hyperlipidemia type  4. Aortic atherosclerosis (HCC)  LDL chronically elevated aside from 2019 when it was excellent (patient reports she had been on a Nutrisystem diet at the time.)  She developed a rash with statin.  For now I would encouraged her to make dietary changes and we can repeat her lipid panel in the coming months-might consider adding another medication to lower cholesterol if needed.  Given ASCVD risk of greater than 10% along with elevated coronary calcium score with aortic atherosclerosis, recommend 81 mg aspirin daily for primary prevention.  - Lipid Profile; Future    5. Hypercalcemia  Chronic as of February this year, mild and asymptomatic with elevated PTH suggestive of primary hyperparathyroidism.  Most recent vitamin D was normal.  We will plan to trend, patient has a appointment with nephrology tomorrow to discuss as well given CKD may be an indication for surgery.   I would argue that given her history of provoked PE it might be better to trend her calcium and monitor for symptoms rather than referring for surgery.  - Comp Metabolic Panel; Future    6. Hyperparathyroidism (HCC)  This is a new finding along with mild hypercalcemia as above.  Has an appointment tomorrow to discuss with nephrology.  She will let me know what the outcome was so we can plan going forward.    7. Elevated alkaline phosphatase level  This is a chronic condition, it is possible that she did not fast on her labs where this was elevated so we will repeat this fasting to see if mildly elevated still.  If it remains elevated will obtain isoenzymes to evaluate etiology.    8. Osteopenia of multiple sites  Noted on her last DEXA, vitamin D normal and she continues with supplementation.  Eats a lot of dairy in her diet which I recommend she continue.  We plan to repeat her DEXA 3 years from the last.    9. Encounter for breast cancer screening using non-mammogram modality  Has a history of breast implants bilaterally, no breast concerns today but agrees to ultrasound rather than mammography.  - US-SCREENING WHOLE BREAST (3D SCREENING); Future    I spent a total of 40 minutes with record review, exam, communication with the patient, and documentation of this encounter.    Return in about 6 months (around 2/24/2022), or if symptoms worsen or fail to improve.    Please note that this dictation was created using voice recognition software. I have made every reasonable attempt to correct obvious errors, but I expect that there are errors of grammar and possibly content that I did not discover before finalizing the note.

## 2021-08-25 ENCOUNTER — OFFICE VISIT (OUTPATIENT)
Dept: NEPHROLOGY | Facility: MEDICAL CENTER | Age: 76
End: 2021-08-25
Payer: MEDICARE

## 2021-08-25 VITALS
OXYGEN SATURATION: 98 % | WEIGHT: 180 LBS | BODY MASS INDEX: 28.25 KG/M2 | DIASTOLIC BLOOD PRESSURE: 72 MMHG | HEART RATE: 54 BPM | TEMPERATURE: 98.1 F | SYSTOLIC BLOOD PRESSURE: 126 MMHG | HEIGHT: 67 IN

## 2021-08-25 DIAGNOSIS — E83.52 HYPERCALCEMIA: ICD-10-CM

## 2021-08-25 DIAGNOSIS — E21.3 HYPERPARATHYROIDISM (HCC): ICD-10-CM

## 2021-08-25 DIAGNOSIS — N18.30 STAGE 3 CHRONIC KIDNEY DISEASE, UNSPECIFIED WHETHER STAGE 3A OR 3B CKD: ICD-10-CM

## 2021-08-25 PROCEDURE — 99204 OFFICE O/P NEW MOD 45 MIN: CPT | Performed by: INTERNAL MEDICINE

## 2021-08-25 ASSESSMENT — ENCOUNTER SYMPTOMS
COUGH: 0
NAUSEA: 0
VOMITING: 0
SHORTNESS OF BREATH: 0
HYPERTENSION: 1
CHILLS: 0
FEVER: 0

## 2021-08-25 ASSESSMENT — FIBROSIS 4 INDEX: FIB4 SCORE: 1.04

## 2021-08-31 ENCOUNTER — HOSPITAL ENCOUNTER (OUTPATIENT)
Dept: RADIOLOGY | Facility: MEDICAL CENTER | Age: 76
End: 2021-08-31
Attending: INTERNAL MEDICINE
Payer: MEDICARE

## 2021-08-31 DIAGNOSIS — E21.3 HYPERPARATHYROIDISM (HCC): ICD-10-CM

## 2021-08-31 PROCEDURE — A9500 TC99M SESTAMIBI: HCPCS

## 2021-09-01 ENCOUNTER — TELEPHONE (OUTPATIENT)
Dept: NEPHROLOGY | Facility: MEDICAL CENTER | Age: 76
End: 2021-09-01

## 2021-09-01 ENCOUNTER — TELEPHONE (OUTPATIENT)
Dept: HEALTH INFORMATION MANAGEMENT | Facility: OTHER | Age: 76
End: 2021-09-01

## 2021-09-01 DIAGNOSIS — E21.3 HYPERPARATHYROIDISM (HCC): ICD-10-CM

## 2021-09-01 NOTE — TELEPHONE ENCOUNTER
Spoke to the patient regarding sestamibi scan results suggestive of parathyroid adenoma, I will make a referral to Dr. Dottie Kemp for further evaluation.

## 2021-09-21 ENCOUNTER — HOSPITAL ENCOUNTER (OUTPATIENT)
Dept: RADIOLOGY | Facility: MEDICAL CENTER | Age: 76
End: 2021-09-21
Attending: NURSE PRACTITIONER
Payer: MEDICARE

## 2021-09-21 DIAGNOSIS — Z12.39 BREAST CANCER SCREENING OTHER THAN MAMMOGRAM: ICD-10-CM

## 2021-09-21 PROCEDURE — 76641 ULTRASOUND BREAST COMPLETE: CPT

## 2021-10-06 ENCOUNTER — HOSPITAL ENCOUNTER (OUTPATIENT)
Dept: RADIOLOGY | Facility: MEDICAL CENTER | Age: 76
End: 2021-10-06
Attending: SURGERY
Payer: MEDICARE

## 2021-10-06 DIAGNOSIS — E21.0 PRIMARY HYPERPARATHYROIDISM (HCC): ICD-10-CM

## 2021-10-06 PROCEDURE — 76536 US EXAM OF HEAD AND NECK: CPT

## 2021-10-12 ENCOUNTER — HOSPITAL ENCOUNTER (OUTPATIENT)
Facility: MEDICAL CENTER | Age: 76
End: 2021-10-12
Attending: SURGERY
Payer: MEDICARE

## 2021-10-12 LAB
CREAT 24H UR-MSRATE: 1269 MG/24 HR (ref 800–1800)
CREAT UR-MCNC: 90.61 MG/DL
SPECIMEN VOL UR: 1400 ML

## 2021-10-12 PROCEDURE — 82340 ASSAY OF CALCIUM IN URINE: CPT

## 2021-10-12 PROCEDURE — 81050 URINALYSIS VOLUME MEASURE: CPT

## 2021-10-12 PROCEDURE — 82570 ASSAY OF URINE CREATININE: CPT

## 2021-10-19 LAB
CALCIUM 24H UR-MCNC: 19.8 MG/DL
CALCIUM 24H UR-MRATE: 297 MG/D
CALCIUM/CREAT 24H UR: 218 MG/G (ref 20–300)
COLLECT DURATION TIME SPEC: 24 HRS
CREAT 24H UR-MCNC: 91 MG/DL
CREAT 24H UR-MRATE: 1365 MG/D (ref 500–1400)
SPECIMEN VOL ?TM UR: NORMAL ML

## 2021-11-17 ENCOUNTER — ANESTHESIA EVENT (OUTPATIENT)
Dept: SURGERY | Facility: MEDICAL CENTER | Age: 76
End: 2021-11-17
Payer: MEDICARE

## 2021-11-17 ENCOUNTER — HOSPITAL ENCOUNTER (OUTPATIENT)
Facility: MEDICAL CENTER | Age: 76
End: 2021-11-18
Attending: SURGERY | Admitting: SURGERY
Payer: MEDICARE

## 2021-11-17 ENCOUNTER — ANESTHESIA (OUTPATIENT)
Dept: SURGERY | Facility: MEDICAL CENTER | Age: 76
End: 2021-11-17
Payer: MEDICARE

## 2021-11-17 DIAGNOSIS — E83.51 IATROGENIC HYPOCALCEMIA: ICD-10-CM

## 2021-11-17 DIAGNOSIS — G89.18 POSTOPERATIVE PAIN: ICD-10-CM

## 2021-11-17 PROBLEM — E21.3 HYPERPARATHYROIDISM (HCC): Status: RESOLVED | Noted: 2021-06-24 | Resolved: 2021-11-17

## 2021-11-17 PROBLEM — E83.52 HYPERCALCEMIA: Status: RESOLVED | Noted: 2021-06-24 | Resolved: 2021-11-17

## 2021-11-17 LAB
ALBUMIN SERPL BCP-MCNC: 4 G/DL (ref 3.2–4.9)
ANION GAP SERPL CALC-SCNC: 11 MMOL/L (ref 7–16)
BUN SERPL-MCNC: 17 MG/DL (ref 8–22)
CALCIUM SERPL-MCNC: 10.2 MG/DL (ref 8.5–10.5)
CALCIUM SERPL-MCNC: 8.9 MG/DL (ref 8.5–10.5)
CHLORIDE SERPL-SCNC: 107 MMOL/L (ref 96–112)
CO2 SERPL-SCNC: 23 MMOL/L (ref 20–33)
COLLECT TME BLD: 1300 HH:MM
COLLECT TME BLD: 1308 HH:MM
COLLECT TME BLD: 1318 HH:MM
COLLECT TME BLD: 1324 HH:MM
CREAT SERPL-MCNC: 0.81 MG/DL (ref 0.5–1.4)
EKG IMPRESSION: NORMAL
ERYTHROCYTE [DISTWIDTH] IN BLOOD BY AUTOMATED COUNT: 45.2 FL (ref 35.9–50)
EXTERNAL QUALITY CONTROL: NORMAL
GLUCOSE SERPL-MCNC: 101 MG/DL (ref 65–99)
HCT VFR BLD AUTO: 47.9 % (ref 37–47)
HGB BLD-MCNC: 16.2 G/DL (ref 12–16)
MCH RBC QN AUTO: 31.9 PG (ref 27–33)
MCHC RBC AUTO-ENTMCNC: 33.8 G/DL (ref 33.6–35)
MCV RBC AUTO: 94.3 FL (ref 81.4–97.8)
PATHOLOGY CONSULT NOTE: NORMAL
PLATELET # BLD AUTO: 285 K/UL (ref 164–446)
PMV BLD AUTO: 10.1 FL (ref 9–12.9)
POTASSIUM SERPL-SCNC: 4.3 MMOL/L (ref 3.6–5.5)
PTH-INTACT IO % DIF SERPL: 61 %
PTH-INTACT IO % DIF SERPL: 70 %
PTH-INTACT SP1 SERPL-MCNC: 150 PG/ML (ref 14–72)
PTH-INTACT SP2 SERPL-MCNC: 142 PG/ML
PTH-INTACT SP3 SERPL-MCNC: 58.4 PG/ML
PTH-INTACT SP4 SERPL-MCNC: 45.5 PG/ML
RBC # BLD AUTO: 5.08 M/UL (ref 4.2–5.4)
SARS-COV+SARS-COV-2 AG RESP QL IA.RAPID: NEGATIVE
SODIUM SERPL-SCNC: 141 MMOL/L (ref 135–145)
WBC # BLD AUTO: 7.6 K/UL (ref 4.8–10.8)

## 2021-11-17 PROCEDURE — 160048 HCHG OR STATISTICAL LEVEL 1-5: Performed by: SURGERY

## 2021-11-17 PROCEDURE — 700102 HCHG RX REV CODE 250 W/ 637 OVERRIDE(OP): Performed by: SURGERY

## 2021-11-17 PROCEDURE — 700111 HCHG RX REV CODE 636 W/ 250 OVERRIDE (IP): Performed by: EMERGENCY MEDICINE

## 2021-11-17 PROCEDURE — 160035 HCHG PACU - 1ST 60 MINS PHASE I: Performed by: SURGERY

## 2021-11-17 PROCEDURE — 88331 PATH CONSLTJ SURG 1 BLK 1SPC: CPT | Mod: 91

## 2021-11-17 PROCEDURE — 700105 HCHG RX REV CODE 258: Performed by: SURGERY

## 2021-11-17 PROCEDURE — 700105 HCHG RX REV CODE 258: Performed by: EMERGENCY MEDICINE

## 2021-11-17 PROCEDURE — 160036 HCHG PACU - EA ADDL 30 MINS PHASE I: Performed by: SURGERY

## 2021-11-17 PROCEDURE — 501838 HCHG SUTURE GENERAL: Performed by: SURGERY

## 2021-11-17 PROCEDURE — 160041 HCHG SURGERY MINUTES - EA ADDL 1 MIN LEVEL 4: Performed by: SURGERY

## 2021-11-17 PROCEDURE — G0378 HOSPITAL OBSERVATION PER HR: HCPCS

## 2021-11-17 PROCEDURE — 80048 BASIC METABOLIC PNL TOTAL CA: CPT

## 2021-11-17 PROCEDURE — A9270 NON-COVERED ITEM OR SERVICE: HCPCS | Performed by: SURGERY

## 2021-11-17 PROCEDURE — 160009 HCHG ANES TIME/MIN: Performed by: SURGERY

## 2021-11-17 PROCEDURE — 502240 HCHG MISC OR SUPPLY RC 0272: Performed by: SURGERY

## 2021-11-17 PROCEDURE — 160029 HCHG SURGERY MINUTES - 1ST 30 MINS LEVEL 4: Performed by: SURGERY

## 2021-11-17 PROCEDURE — 82310 ASSAY OF CALCIUM: CPT | Mod: XU

## 2021-11-17 PROCEDURE — 87426 SARSCOV CORONAVIRUS AG IA: CPT | Performed by: SURGERY

## 2021-11-17 PROCEDURE — 93010 ELECTROCARDIOGRAM REPORT: CPT | Performed by: INTERNAL MEDICINE

## 2021-11-17 PROCEDURE — 93005 ELECTROCARDIOGRAM TRACING: CPT | Performed by: SURGERY

## 2021-11-17 PROCEDURE — 85027 COMPLETE CBC AUTOMATED: CPT

## 2021-11-17 PROCEDURE — 88305 TISSUE EXAM BY PATHOLOGIST: CPT | Mod: 91

## 2021-11-17 PROCEDURE — 700101 HCHG RX REV CODE 250: Performed by: EMERGENCY MEDICINE

## 2021-11-17 PROCEDURE — A9270 NON-COVERED ITEM OR SERVICE: HCPCS | Performed by: EMERGENCY MEDICINE

## 2021-11-17 PROCEDURE — 160002 HCHG RECOVERY MINUTES (STAT): Performed by: SURGERY

## 2021-11-17 PROCEDURE — 700102 HCHG RX REV CODE 250 W/ 637 OVERRIDE(OP): Performed by: EMERGENCY MEDICINE

## 2021-11-17 PROCEDURE — 83970 ASSAY OF PARATHORMONE: CPT | Mod: 91

## 2021-11-17 PROCEDURE — 502594 HCHG SCISSOR HANDLE, HARMONIC ACE: Performed by: SURGERY

## 2021-11-17 PROCEDURE — 82040 ASSAY OF SERUM ALBUMIN: CPT

## 2021-11-17 RX ORDER — SCOLOPAMINE TRANSDERMAL SYSTEM 1 MG/1
1 PATCH, EXTENDED RELEASE TRANSDERMAL
Status: DISCONTINUED | OUTPATIENT
Start: 2021-11-17 | End: 2021-11-18 | Stop reason: HOSPADM

## 2021-11-17 RX ORDER — DEXAMETHASONE SODIUM PHOSPHATE 4 MG/ML
INJECTION, SOLUTION INTRA-ARTICULAR; INTRALESIONAL; INTRAMUSCULAR; INTRAVENOUS; SOFT TISSUE PRN
Status: DISCONTINUED | OUTPATIENT
Start: 2021-11-17 | End: 2021-11-17 | Stop reason: SURG

## 2021-11-17 RX ORDER — ONDANSETRON 2 MG/ML
INJECTION INTRAMUSCULAR; INTRAVENOUS PRN
Status: DISCONTINUED | OUTPATIENT
Start: 2021-11-17 | End: 2021-11-17 | Stop reason: SURG

## 2021-11-17 RX ORDER — DIPHENHYDRAMINE HYDROCHLORIDE 50 MG/ML
25 INJECTION INTRAMUSCULAR; INTRAVENOUS EVERY 6 HOURS PRN
Status: DISCONTINUED | OUTPATIENT
Start: 2021-11-17 | End: 2021-11-18 | Stop reason: HOSPADM

## 2021-11-17 RX ORDER — HYDROMORPHONE HYDROCHLORIDE 1 MG/ML
0.1 INJECTION, SOLUTION INTRAMUSCULAR; INTRAVENOUS; SUBCUTANEOUS
Status: DISCONTINUED | OUTPATIENT
Start: 2021-11-17 | End: 2021-11-17 | Stop reason: HOSPADM

## 2021-11-17 RX ORDER — HYDROCODONE BITARTRATE AND ACETAMINOPHEN 5; 325 MG/1; MG/1
1 TABLET ORAL EVERY 6 HOURS PRN
Status: DISCONTINUED | OUTPATIENT
Start: 2021-11-17 | End: 2021-11-18 | Stop reason: HOSPADM

## 2021-11-17 RX ORDER — HYDROCODONE BITARTRATE AND ACETAMINOPHEN 5; 325 MG/1; MG/1
1 TABLET ORAL EVERY 4 HOURS PRN
Qty: 20 TABLET | Refills: 0 | Status: SHIPPED | OUTPATIENT
Start: 2021-11-17 | End: 2021-11-24

## 2021-11-17 RX ORDER — SODIUM CHLORIDE, SODIUM LACTATE, POTASSIUM CHLORIDE, AND CALCIUM CHLORIDE .6; .31; .03; .02 G/100ML; G/100ML; G/100ML; G/100ML
500 INJECTION, SOLUTION INTRAVENOUS ONCE
Status: COMPLETED | OUTPATIENT
Start: 2021-11-17 | End: 2021-11-17

## 2021-11-17 RX ORDER — LOSARTAN POTASSIUM 50 MG/1
50 TABLET ORAL DAILY
Status: DISCONTINUED | OUTPATIENT
Start: 2021-11-17 | End: 2021-11-18 | Stop reason: HOSPADM

## 2021-11-17 RX ORDER — HALOPERIDOL 5 MG/ML
1 INJECTION INTRAMUSCULAR EVERY 6 HOURS PRN
Status: DISCONTINUED | OUTPATIENT
Start: 2021-11-17 | End: 2021-11-18 | Stop reason: HOSPADM

## 2021-11-17 RX ORDER — CEFAZOLIN SODIUM 1 G/3ML
INJECTION, POWDER, FOR SOLUTION INTRAMUSCULAR; INTRAVENOUS PRN
Status: DISCONTINUED | OUTPATIENT
Start: 2021-11-17 | End: 2021-11-17 | Stop reason: SURG

## 2021-11-17 RX ORDER — DIPHENHYDRAMINE HYDROCHLORIDE 50 MG/ML
12.5 INJECTION INTRAMUSCULAR; INTRAVENOUS
Status: DISCONTINUED | OUTPATIENT
Start: 2021-11-17 | End: 2021-11-17 | Stop reason: HOSPADM

## 2021-11-17 RX ORDER — ONDANSETRON 2 MG/ML
4 INJECTION INTRAMUSCULAR; INTRAVENOUS EVERY 4 HOURS PRN
Status: DISCONTINUED | OUTPATIENT
Start: 2021-11-17 | End: 2021-11-18 | Stop reason: HOSPADM

## 2021-11-17 RX ORDER — SODIUM CHLORIDE, SODIUM LACTATE, POTASSIUM CHLORIDE, CALCIUM CHLORIDE 600; 310; 30; 20 MG/100ML; MG/100ML; MG/100ML; MG/100ML
INJECTION, SOLUTION INTRAVENOUS CONTINUOUS
Status: ACTIVE | OUTPATIENT
Start: 2021-11-17 | End: 2021-11-17

## 2021-11-17 RX ORDER — HYDRALAZINE HYDROCHLORIDE 20 MG/ML
5 INJECTION INTRAMUSCULAR; INTRAVENOUS
Status: DISCONTINUED | OUTPATIENT
Start: 2021-11-17 | End: 2021-11-17 | Stop reason: HOSPADM

## 2021-11-17 RX ORDER — HYDROMORPHONE HYDROCHLORIDE 1 MG/ML
0.2 INJECTION, SOLUTION INTRAMUSCULAR; INTRAVENOUS; SUBCUTANEOUS
Status: DISCONTINUED | OUTPATIENT
Start: 2021-11-17 | End: 2021-11-17 | Stop reason: HOSPADM

## 2021-11-17 RX ORDER — LIDOCAINE HYDROCHLORIDE 20 MG/ML
INJECTION, SOLUTION EPIDURAL; INFILTRATION; INTRACAUDAL; PERINEURAL PRN
Status: DISCONTINUED | OUTPATIENT
Start: 2021-11-17 | End: 2021-11-17 | Stop reason: SURG

## 2021-11-17 RX ORDER — HYDROMORPHONE HYDROCHLORIDE 1 MG/ML
0.4 INJECTION, SOLUTION INTRAMUSCULAR; INTRAVENOUS; SUBCUTANEOUS
Status: DISCONTINUED | OUTPATIENT
Start: 2021-11-17 | End: 2021-11-17 | Stop reason: HOSPADM

## 2021-11-17 RX ORDER — CALCIUM CARBONATE 500 MG/1
2000 TABLET, CHEWABLE ORAL
Status: DISCONTINUED | OUTPATIENT
Start: 2021-11-17 | End: 2021-11-18 | Stop reason: HOSPADM

## 2021-11-17 RX ORDER — HALOPERIDOL 5 MG/ML
1 INJECTION INTRAMUSCULAR
Status: DISCONTINUED | OUTPATIENT
Start: 2021-11-17 | End: 2021-11-17 | Stop reason: HOSPADM

## 2021-11-17 RX ORDER — LABETALOL HYDROCHLORIDE 5 MG/ML
5 INJECTION, SOLUTION INTRAVENOUS
Status: DISCONTINUED | OUTPATIENT
Start: 2021-11-17 | End: 2021-11-17 | Stop reason: HOSPADM

## 2021-11-17 RX ORDER — MEPERIDINE HYDROCHLORIDE 25 MG/ML
6.25 INJECTION INTRAMUSCULAR; INTRAVENOUS; SUBCUTANEOUS
Status: DISCONTINUED | OUTPATIENT
Start: 2021-11-17 | End: 2021-11-17 | Stop reason: HOSPADM

## 2021-11-17 RX ORDER — DEXAMETHASONE SODIUM PHOSPHATE 4 MG/ML
4 INJECTION, SOLUTION INTRA-ARTICULAR; INTRALESIONAL; INTRAMUSCULAR; INTRAVENOUS; SOFT TISSUE
Status: DISCONTINUED | OUTPATIENT
Start: 2021-11-17 | End: 2021-11-18 | Stop reason: HOSPADM

## 2021-11-17 RX ORDER — ONDANSETRON 2 MG/ML
4 INJECTION INTRAMUSCULAR; INTRAVENOUS
Status: DISCONTINUED | OUTPATIENT
Start: 2021-11-17 | End: 2021-11-17 | Stop reason: HOSPADM

## 2021-11-17 RX ORDER — HYDROMORPHONE HYDROCHLORIDE 2 MG/ML
INJECTION, SOLUTION INTRAMUSCULAR; INTRAVENOUS; SUBCUTANEOUS PRN
Status: DISCONTINUED | OUTPATIENT
Start: 2021-11-17 | End: 2021-11-17 | Stop reason: SURG

## 2021-11-17 RX ORDER — CALCIUM CARBONATE 1000 MG/1
2000 TABLET, CHEWABLE ORAL EVERY MORNING
Qty: 90 TABLET | Refills: 3 | Status: SHIPPED | OUTPATIENT
Start: 2021-11-17

## 2021-11-17 RX ORDER — OXYCODONE HYDROCHLORIDE AND ACETAMINOPHEN 5; 325 MG/1; MG/1
2 TABLET ORAL
Status: COMPLETED | OUTPATIENT
Start: 2021-11-17 | End: 2021-11-17

## 2021-11-17 RX ORDER — OXYCODONE HYDROCHLORIDE AND ACETAMINOPHEN 5; 325 MG/1; MG/1
1 TABLET ORAL
Status: COMPLETED | OUTPATIENT
Start: 2021-11-17 | End: 2021-11-17

## 2021-11-17 RX ADMIN — FENTANYL CITRATE 100 MCG: 50 INJECTION, SOLUTION INTRAMUSCULAR; INTRAVENOUS at 12:45

## 2021-11-17 RX ADMIN — FENTANYL CITRATE 25 MCG: 50 INJECTION, SOLUTION INTRAMUSCULAR; INTRAVENOUS at 14:54

## 2021-11-17 RX ADMIN — LOSARTAN POTASSIUM 50 MG: 50 TABLET, FILM COATED ORAL at 18:08

## 2021-11-17 RX ADMIN — PHENYLEPHRINE HYDROCHLORIDE 20 MCG/MIN: 10 INJECTION INTRAVENOUS at 13:15

## 2021-11-17 RX ADMIN — CEFAZOLIN 2 G: 330 INJECTION, POWDER, FOR SOLUTION INTRAMUSCULAR; INTRAVENOUS at 12:55

## 2021-11-17 RX ADMIN — Medication 100 MG: at 12:46

## 2021-11-17 RX ADMIN — ONDANSETRON 4 MG: 2 INJECTION INTRAMUSCULAR; INTRAVENOUS at 13:05

## 2021-11-17 RX ADMIN — OXYCODONE HYDROCHLORIDE AND ACETAMINOPHEN 1 TABLET: 5; 325 TABLET ORAL at 14:54

## 2021-11-17 RX ADMIN — PROPOFOL 120 MG: 10 INJECTION, EMULSION INTRAVENOUS at 12:46

## 2021-11-17 RX ADMIN — LIDOCAINE HYDROCHLORIDE 100 MG: 20 INJECTION, SOLUTION EPIDURAL; INFILTRATION; INTRACAUDAL at 12:46

## 2021-11-17 RX ADMIN — DEXAMETHASONE SODIUM PHOSPHATE 4 MG: 4 INJECTION, SOLUTION INTRA-ARTICULAR; INTRALESIONAL; INTRAMUSCULAR; INTRAVENOUS; SOFT TISSUE at 13:05

## 2021-11-17 RX ADMIN — CALCIUM CARBONATE 2000 MG: 500 TABLET, CHEWABLE ORAL at 18:09

## 2021-11-17 RX ADMIN — SODIUM CHLORIDE, POTASSIUM CHLORIDE, SODIUM LACTATE AND CALCIUM CHLORIDE 500 ML: 600; 310; 30; 20 INJECTION, SOLUTION INTRAVENOUS at 14:20

## 2021-11-17 RX ADMIN — SODIUM CHLORIDE, POTASSIUM CHLORIDE, SODIUM LACTATE AND CALCIUM CHLORIDE 1000 ML: 600; 310; 30; 20 INJECTION, SOLUTION INTRAVENOUS at 10:50

## 2021-11-17 RX ADMIN — HYDROMORPHONE HYDROCHLORIDE 1 MG: 2 INJECTION, SOLUTION INTRAMUSCULAR; INTRAVENOUS; SUBCUTANEOUS at 13:05

## 2021-11-17 ASSESSMENT — PAIN DESCRIPTION - PAIN TYPE
TYPE: SURGICAL PAIN
TYPE: ACUTE PAIN
TYPE: SURGICAL PAIN
TYPE: SURGICAL PAIN
TYPE: ACUTE PAIN
TYPE: SURGICAL PAIN

## 2021-11-17 ASSESSMENT — PAIN SCALES - GENERAL: PAIN_LEVEL: 1

## 2021-11-17 ASSESSMENT — FIBROSIS 4 INDEX: FIB4 SCORE: 1.04

## 2021-11-17 NOTE — OR NURSING
COVID-19 Pre-surgery screenin. Do you have an undiagnosed respiratory illness or symptoms such as coughing or sneezing?  (No)      2. Do you have an unexplained fever greater than 100.4 degrees Fahrenheit or 38 degrees Celsius?      (No)    3. Have you had direct exposure to a patient who tested positive for Covid-19?     (No)    4. Have you had any loss of your sense of taste or smell? Have you had N/V or sore throat? No    Patient has been informed of visitor policy and asked to wear a mask upon entering the hospital   (Yes)

## 2021-11-17 NOTE — ANESTHESIA TIME REPORT
Anesthesia Start and Stop Event Times     Date Time Event    11/17/2021 1236 Ready for Procedure     1237 Anesthesia Start     1403 Anesthesia Stop        Responsible Staff  11/17/21    Name Role Begin End    Rodrigo Vargas M.D. Anesth 1237 1403        Preop Diagnosis (Free Text):  Pre-op Diagnosis     PRIMARY HYPERPARATHYROIDISM        Preop Diagnosis (Codes):    Premium Reason  Non-Premium    Comments:

## 2021-11-17 NOTE — OR NURSING
1550- Report received from ZAHIDA Neville     1637- Report called to ZAHIDA Bowen    1713- Pt transferred to room with transport and belongings.  Still on 10L mask per ERAS protocol.

## 2021-11-17 NOTE — ANESTHESIA PREPROCEDURE EVALUATION
Case: [id number] Anesthesia Start Date/Time: 11/17/21 1237    Procedure: PARATHYROIDECTOMY (Right )    Pre-op diagnosis: PRIMARY HYPERPARATHYROIDISM    Location: [facility] ROOM 23 / SURGERY SAME DAY [facility]    Surgeons: Dottie Kemp M.D.          Relevant Problems   NEURO   (positive) History of pulmonary embolism      CARDIAC   (positive) Aortic atherosclerosis (HCC)   (positive) Essential hypertension         (positive) Stage 3 chronic kidney disease (HCC)       Physical Exam    Airway   Mallampati: II  TM distance: >3 FB  Neck ROM: full       Cardiovascular - normal exam  Rhythm: regular  Rate: normal  (-) murmur     Dental - normal exam           Pulmonary - normal exam  Breath sounds clear to auscultation     Abdominal    Neurological - normal exam                 Anesthesia Plan    ASA 3       Plan - general       Airway plan will be ETT          Induction: intravenous    Postoperative Plan: Postoperative administration of opioids is intended.    Pertinent diagnostic labs and testing reviewed    Informed Consent:    Anesthetic plan and risks discussed with patient.    Use of blood products discussed with: patient whom consented to blood products.

## 2021-11-17 NOTE — ANESTHESIA PROCEDURE NOTES
Airway    Date/Time: 11/17/2021 12:48 PM  Performed by: Rodrigo Vargas M.D.  Authorized by: Rodrigo Vargas M.D.     Location:  OR  Urgency:  Elective  Difficult Airway: No    Indications for Airway Management:  Anesthesia      Spontaneous Ventilation: absent    Sedation Level:  Deep  Preoxygenated: Yes    Patient Position:  Sniffing  Final Airway Type:  Endotracheal airway  Final Endotracheal Airway:  ETT and NIM tube  Cuffed: Yes    Technique Used for Successful ETT Placement:  Direct laryngoscopy    Insertion Site:  Oral  Blade Type:  Levy  Laryngoscope Blade/Videolaryngoscope Blade Size:  2  ETT Size (mm):  7.0  Measured from:  Teeth  ETT to Teeth (cm):  22  Placement Verified by: auscultation and capnometry    Cormack-Lehane Classification:  Grade I - full view of glottis  Number of Attempts at Approach:  1

## 2021-11-17 NOTE — OP REPORT
Operative Report     Date: 11/17/2021    Surgeon: Dottie Kemp M.D.     Assistant: CADENCE Ballard    My assistant, CADENCE Ballard, was medically necessary for this procedure. He placed the precordial electrodes were placed by the surgical assistant, who then monitored the recurrent laryngeal nerve throughout the procedure, as well as retracted the tissues to aid in my visualization of the parathyroid tissue, and assisted with wound closure.    Anesthesiologist: Sam PELAEZ    Pre-operative Diagnosis: E 21.0 primary hyperparathyroidism     Post-operative Diagnosis: same, right superior parathyroid adenoma     Procedure:   1. parathyroid exploration with intraoperative PTH monitoring (37017 Parathyroidectomy or exploration of parathyroid)  2. unilateral recurrent laryngeal nerve monitoring     Findings: Enlarged right superior and right inferior parathyroid tissue, hypercellular on frozen exam. Intraoperative PTH monitoring was performed with levels drawn at appropriate intervals. There was a 70% drop from the highest level, with the final level being 45 picograms/deciliter.     Procedure in detail: The patient was identified in the pre-operative holding area and brought to the operating room. Correct side and site were identified. GETA was smoothly induced. The patient was prepped and draped in the usual sterile fashion.     With the patient in the supine position, the head of the bed slightly elevated, the neck slightly extended, and the patient intubated with a NIM endotracheal tube, the precordial electrodes were placed by the surgical assistant, who then monitored the recurrent laryngeal nerve throughout the procedure.     The neck was prepared with betadiene and draped in the usual fashion. The neck was entered through a short lower transverse cervical incision and carried down through the platysma. Subplatysmal flaps were dissected superiorly and inferiorly down to the sternal notch. The midline cervical  fascia was incised down to the capsule of the thyroid.     The strap muscles were mobilized off the right thyroid lobe, and with careful dissection we identified an enlarged appearing right inferior gland. The vascular pedicles were divided with the Ligasure device. The gland was sent for immediate pathologic exam, which revealed a hypercellular enlarged parathyroid gland.     Dissecting more superiorly, we identified a massive right superior parathyroid, this was also carefully delivered out of the neck and sent for immediate pathologic exam, which also revealed hyperplasia. Given this, my putative diagnosis was 4 gland hyperplasia, I then elected to explore the left side.    The strap muscules were mobilized off the left thyroid lobe, and it was carefully reflected medially. I identified a mildly globular left superior parathyroid, a portion of which was excised and sent for permanent pathologic exam.    Intraoperative PTH monitoring was performed with levels drawn at appropriate intervals. There was a 70% drop from the highest level, with the final level being 44 picograms/deciliter. Given this I elected to stop my exploration.    The integrity of the bilateral laryngeal nerves was documented. Hemoblast was placed in both sides side of the neck. The midline cervical fascia was reapproximated with running 3-0 Vicryl. Platysma was reapproximated with interrupted 3-0 Vicryl. The skin was closed with running monocryl.     The patient was awakened from general anesthetic, and was taken to the recovery room in stable condition.     Sponge and needle counts were correct at the end of the case.     Specimen:   1. Right inferior parathyroid  2. Right superior parathyroid  3. Portion left superior parathyroid    EBL: minimal     Dispo: stable, extubated, to PACU     Dottie Kemp M.D.   [facility]   [phone]

## 2021-11-17 NOTE — ANESTHESIA POSTPROCEDURE EVALUATION
Patient: Katy Perez    Procedure Summary     Date: 11/17/21 Room / Location: [facility] ROOM 23 / SURGERY SAME DAY [facility]    Anesthesia Start: 1237 Anesthesia Stop: 1403    Procedure: PARATHYROIDECTOMY (Right Neck) Diagnosis: (PRIMARY HYPERPARATHYROIDISM)    Surgeons: Dottie Kemp M.D. Responsible Provider: Rodrigo Vargas M.D.    Anesthesia Type: general ASA Status: 3          Final Anesthesia Type: general  Last vitals  BP        Temp   36.3 °C (97.3 °F)    Pulse   (!) 2   Resp   16    SpO2   97 %      Anesthesia Post Evaluation    Patient location during evaluation: PACU  Patient participation: complete - patient participated  Level of consciousness: awake and alert  Pain score: 1    Airway patency: patent  Anesthetic complications: no  Cardiovascular status: hemodynamically stable  Respiratory status: acceptable  Hydration status: euvolemic    PONV: none          No complications documented.     Nurse Pain Score: 0 (NPRS)

## 2021-11-17 NOTE — ANESTHESIA PROCEDURE NOTES
Arterial Line  Performed by: Rodrigo Vargas M.D.  Authorized by: Rodrigo Vargas M.D.     Start Time:  11/17/2021 12:50 PM  End Time:  11/17/2021 12:51 PM  Localization: surface landmarks    Patient Location:  OR  Indication: continuous blood pressure monitoring        Catheter Size:  20 G  Seldinger Technique?: Yes    Laterality:  Right  Site:  Radial artery  Line Secured:  Antimicrobial disc, tape and transparent dressing  Events: patient tolerated procedure well with no complications

## 2021-11-17 NOTE — OR NURSING
1401 from OR to PACU 10. Connected to monitor. Report received from anesthesia & RN. VSS. 02 6L via mask with oral airway. Breaths calm, even, unlabored.  Surgical dressing to anterior neck clean, dry, intact.     1402 Oral airway dc'd, 10L oxymask applied per ERAS orders.     1500 Art line dc'd; manual pressure to site for 8 min until hemostasis achieved. Medicated for pain 4/10; see MAR.     1549 handoff to Fidelia TEAGUE.

## 2021-11-18 ENCOUNTER — PATIENT OUTREACH (OUTPATIENT)
Dept: HEALTH INFORMATION MANAGEMENT | Facility: OTHER | Age: 76
End: 2021-11-18

## 2021-11-18 VITALS
DIASTOLIC BLOOD PRESSURE: 63 MMHG | HEART RATE: 64 BPM | OXYGEN SATURATION: 96 % | SYSTOLIC BLOOD PRESSURE: 137 MMHG | HEIGHT: 67 IN | RESPIRATION RATE: 18 BRPM | WEIGHT: 182.1 LBS | BODY MASS INDEX: 28.58 KG/M2 | TEMPERATURE: 96.8 F

## 2021-11-18 LAB
ALBUMIN SERPL BCP-MCNC: 3.6 G/DL (ref 3.2–4.9)
CALCIUM SERPL-MCNC: 8.7 MG/DL (ref 8.5–10.5)

## 2021-11-18 PROCEDURE — G0378 HOSPITAL OBSERVATION PER HR: HCPCS

## 2021-11-18 PROCEDURE — 82310 ASSAY OF CALCIUM: CPT

## 2021-11-18 PROCEDURE — 82040 ASSAY OF SERUM ALBUMIN: CPT

## 2021-11-18 PROCEDURE — A9270 NON-COVERED ITEM OR SERVICE: HCPCS | Performed by: SURGERY

## 2021-11-18 PROCEDURE — 700102 HCHG RX REV CODE 250 W/ 637 OVERRIDE(OP): Performed by: SURGERY

## 2021-11-18 RX ADMIN — LOSARTAN POTASSIUM 50 MG: 50 TABLET, FILM COATED ORAL at 05:13

## 2021-11-18 RX ADMIN — CALCIUM CARBONATE 2000 MG: 500 TABLET, CHEWABLE ORAL at 09:51

## 2021-11-18 NOTE — CARE PLAN
The patient is Stable - Low risk of patient condition declining or worsening    Shift Goals  Clinical Goals: pain control  Patient Goals: pain control, rest   Family Goals: N/A    Progress made toward(s) clinical / shift goals: Pt able to rest this shift, no complaints of pain/discomfort.     Patient is not progressing towards the following goals:

## 2021-11-18 NOTE — DISCHARGE INSTRUCTIONS
Post-Operative Discharge Instructions for Parathyroidectomy      ACTIVITY:   • Most patients are able to return to a full-time work schedule in 1-2 weeks; however this may vary according to your job. It may take longer to return to heavy physical or other demanding work, or shorter if you are feeling well.     • Do NOT drive a car until you are able to turn the neck side to side, which may take 1-2 weeks.      • Do NOT drive while you are taking pain medicines.      DIET:   • You may have temporary throat discomfort or difficulty swallowing. This is due to the surgery around your larynx (voice box) and esophagus (swallowing tube). These symptoms will gradually improve over the course of several weeks.      • Drink and eat foods that can be swallowed easily, e.g. juice, soup, gelatin, applesauce, scrambled eggs or mashed potatoes.      • You may be able to return to your usual diet in a couple of days.      INCISION CARE:   • You may remove the outer dressing 48 hours after surgery.     • You may shower 48 hours after surgery but please do not swim or soak in a tub for at least 2 weeks. After you finish showering, just pat your incision dry. If it is draining clear fluid, you can cover it with a dry dressing (such as gauze). Do NOT scrub with soap or washcloth for the first 10 days.      • Mild swelling at the incision site will go away in 4-6 weeks. The pink line will slowly fade to white during the next 6-12 months.      • Use a sunscreen (SPF#30 or higher) or wear a scarf for protection if in the sun for the first 6 months to a year as the sun can darken your scar.      • You may begin to use a hypoallergenic moisturizing cream (no vitamin E, Mederma, or other “scar” creams) along the incision after 2 weeks.      COMMON PROBLEMS:   • Numbness of the skin under the chin or above the incision is normal and should go away in a few weeks.      • You may feel a lump or pressure in your throat sensation when swallowing  for a few weeks.      • Your incision may feel itchy while it heals. Avoid rubbing or scratching if possible.      • You may feel neck stiffness, tightness, a pulling feeling, mild aching chest discomfort, headache, ear pain or congestion. Take a mild pain medicine such as Tylenol or Advil. Put heat on the area using a hot water bottle, heating pad or warm shower.      • Some people prefer to sleep with an extra pillow for the first few days after the surgery, this helps keep swelling around your incision to a minimum.      • Your voice may be hoarse or weak. Pitch or tone may change. You may have difficulty singing. This usually goes back to normal over 6 weeks to 6 months.      • After surgery, you may notice a change in your mood, emotional ups and downs, depression, irritability or fatigue and weakness. These changes will get better as time passes.      • You do not need to be at bed rest, being active is normally well tolerated within reason.      MEDICATIONS:   Take your TUMS as directed. Signs of low blood calcium can be tingling in your hands or feet or around your mouth. If you experience these symptoms, take more TUMS. If the symptoms persist despite this, please call the physician.     Please note that it is common for the calcium level to be low following removal of the parathyroid, and you may experience numbness or tingling, which is a sign of low calcium. If this happens, it should improve when you take your calcium supplements. If it does not, please call your doctor.      • Please resume your pre-hospital medications. You should follow-up with your primary care physician regarding new prescriptions and refills.      • We will supply you with a prescription for a mild narcotic pain medication. You are not required to take it. If you do take it, please do not drive or drink alcohol as these in combination may make you drowsy. Most patients do not need strong pain medicine by the time you leave the  hospital. You can take Tylenol (acetaminophen) or ibuprofen (e.g. Advil) as needed.      • If you are taking supplemental calcium or narcotics, you may also want to take a stool softener, such as over-the-counter Colace or Senna, to avoid constipation. Stay hydrated by drinking some extra water.      LABORATORY DRAW:  You have been provided with a prescription to have you calcium levels drawn prior to your follow up visit. The order is at the laboratory that you designated at as your lab of choice at the office. Please have this drawn ~2 days prior to your follow-up visit.     CALL YOUR DOCTOR IF:   • Call your doctor or go to the Emergency Room if you have fever (temperature greater than 100.5), chills, lightheadedness, shortness of breath, difficulty breathing, nausea, vomiting, numbness or tingling in your fingers, hands, or mouth, muscle spasms, or if you notice signs of wound infection (redness, tenderness, or drainage from the incision). Please also call or go to the Emergency Room if you have any other urgent concerns.      FOLLOW-UP:   With Dr. Kemp in one week, call to schedule, [phone].    ACTIVITY: Rest and take it easy for the first 24 hours.  A responsible adult is recommended to remain with you during that time.  It is normal to feel sleepy.  We encourage you to not do anything that requires balance, judgment or coordination.    MILD FLU-LIKE SYMPTOMS ARE NORMAL. YOU MAY EXPERIENCE GENERALIZED MUSCLE ACHES, THROAT IRRITATION, HEADACHE AND/OR SOME NAUSEA.    FOR 24 HOURS DO NOT:  Drive, operate machinery or run household appliances.  Drink beer or alcoholic beverages.   Make important decisions or sign legal documents.    SPECIAL INSTRUCTIONS:     DIET: To avoid nausea, slowly advance diet as tolerated, avoiding spicy or greasy foods for the first day.  Add more substantial food to your diet according to your physician's instructions.  Babies can be fed formula or breast milk as soon as they are  hungry.  INCREASE FLUIDS AND FIBER TO AVOID CONSTIPATION.    SURGICAL DRESSING/BATHING: OK    FOLLOW-UP APPOINTMENT:  A follow-up appointment should be arranged with your doctor in 1 week; call to schedule.    You should CALL YOUR PHYSICIAN if you develop:  Fever greater than 101 degrees F.  Pain not relieved by medication, or persistent nausea or vomiting.  Excessive bleeding (blood soaking through dressing) or unexpected drainage from the wound.  Extreme redness or swelling around the incision site, drainage of pus or foul smelling drainage.  Inability to urinate or empty your bladder within 8 hours.  Problems with breathing or chest pain.    You should call 911 if you develop problems with breathing or chest pain.  If you are unable to contact your doctor or surgical center, you should go to the nearest emergency room or urgent care center.  Physician's telephone #: Dr. Dottie Kemp [phone]    If any questions arise, call your doctor.  If your doctor is not available, please feel free to call the Surgical Center at [phone]. The Contact Center is open Monday through Friday 7AM to 5PM and may speak to a nurse at [phone], or toll free at [phone].     A registered nurse may call you a few days after your surgery to see how you are doing after your procedure.    MEDICATIONS: Resume taking daily medication.  Take prescribed pain medication with food.  If no medication is prescribed, you may take non-aspirin pain medication if needed.  PAIN MEDICATION CAN BE VERY CONSTIPATING.  Take a stool softener or laxative such as senokot, pericolace, or milk of magnesia if needed.    Prescription given for NORCO.  Last pain medication given at 00.    If your physician has prescribed pain medication that includes Acetaminophen (Tylenol), do not take additional Acetaminophen (Tylenol) while taking the prescribed medication.    Depression / Suicide Risk    As you are discharged from this [facility]  facility, it is important to learn how to keep safe from harming yourself.    Recognize the warning signs:  · Abrupt changes in personality, positive or negative- including increase in energy   · Giving away possessions  · Change in eating patterns- significant weight changes-  positive or negative  · Change in sleeping patterns- unable to sleep or sleeping all the time   · Unwillingness or inability to communicate  · Depression  · Unusual sadness, discouragement and loneliness  · Talk of wanting to die  · Neglect of personal appearance   · Rebelliousness- reckless behavior  · Withdrawal from people/activities they love  · Confusion- inability to concentrate     If you or a loved one observes any of these behaviors or has concerns about self-harm, here's what you can do:  · Talk about it- your feelings and reasons for harming yourself  · Remove any means that you might use to hurt yourself (examples: pills, rope, extension cords, firearm)  · Get professional help from the community (Mental Health, Substance Abuse, psychological counseling)  · Do not be alone:Call your Safe Contact- someone whom you trust who will be there for you.  · Call your local CRISIS HOTLINE 100-1483 or 528-886-5679  · Call your local Children's Mobile Crisis Response Team [place] (164) 657-2388 or www.Sape  · Call the toll free National Suicide Prevention Hotlines   · National Suicide Prevention Lifeline 633-862-GTDJ (5992)  · National Hope Line Network 800-SUICIDE (640-2834)    Discharge Instructions    Discharged to home by car with relative. Discharged via walking, hospital escort: Yes.  Special equipment needed: Not Applicable    Be sure to schedule a follow-up appointment with your primary care doctor or any specialists as instructed.     Discharge Plan:   Diet Plan: Discussed  Activity Level: Discussed  Confirmed Follow up Appointment: Patient to Call and Schedule Appointment  Confirmed Symptoms Management:  Discussed  Medication Reconciliation Updated: Yes  Influenza Vaccine Indication: Not indicated: Previously immunized this influenza season and > 8 years of age    I understand that a diet low in cholesterol, fat, and sodium is recommended for good health. Unless I have been given specific instructions below for another diet, I accept this instruction as my diet prescription.   Other diet: Regular Diet    Special Instructions: None    · Is patient discharged on Warfarin / Coumadin?   No     Depression / Suicide Risk    As you are discharged from this [facility] facility, it is important to learn how to keep safe from harming yourself.    Recognize the warning signs:  · Abrupt changes in personality, positive or negative- including increase in energy   · Giving away possessions  · Change in eating patterns- significant weight changes-  positive or negative  · Change in sleeping patterns- unable to sleep or sleeping all the time   · Unwillingness or inability to communicate  · Depression  · Unusual sadness, discouragement and loneliness  · Talk of wanting to die  · Neglect of personal appearance   · Rebelliousness- reckless behavior  · Withdrawal from people/activities they love  · Confusion- inability to concentrate     If you or a loved one observes any of these behaviors or has concerns about self-harm, here's what you can do:  · Talk about it- your feelings and reasons for harming yourself  · Remove any means that you might use to hurt yourself (examples: pills, rope, extension cords, firearm)  · Get professional help from the community (Mental Health, Substance Abuse, psychological counseling)  · Do not be alone:Call your Safe Contact- someone whom you trust who will be there for you.  · Call your local CRISIS HOTLINE 653-2219 or 527-522-3281  · Call your local Children's Mobile Crisis Response Team [place] (208) 305-8749 or www.[employer]  · Call the toll free National Suicide Prevention Hotlines    · National Suicide Prevention Lifeline 090-778-SLRL (1470)  · National Hope Line Network 800-SUICIDE (082-2008)

## 2021-11-18 NOTE — PROGRESS NOTES
Report received from ZAHIDA Bowen.  Assumed care of pt.  Pt in bed, sleeping. AOx4, responds appropriately. Pain 0/10.  Denies SOB, n/v. Reviewed POC, call light and belongings within reach, treaded slipper socks on, bed in lowest locked position. No further needs at this time.

## 2021-11-18 NOTE — PROGRESS NOTES
Pt arrived to room T211   Report received bedside for post op RN   Pt with no c/o pain at this time   All pt needs addressed   COVID 19 surge crisis charting protocol observed

## 2021-11-18 NOTE — CARE PLAN
The patient is Stable - Low risk of patient condition declining or worsening    Shift Goals  Clinical Goals: pain control  Patient Goals: pain control, rest   Family Goals: N/A    Progress made toward(s) clinical / shift goals:    Problem: Knowledge Deficit - Standard  Goal: Patient and family/care givers will demonstrate understanding of plan of care, disease process/condition, diagnostic tests and medications  Outcome: Progressing       Patient is not progressing towards the following goals:

## 2021-11-18 NOTE — PROGRESS NOTES
"Surgical Progress Note:    POD# 1  S/P parathyroid exploration     I feel great! Minimal pain, no complaints.    PE:  /63   Pulse 64   Temp 36 °C (96.8 °F) (Temporal)   Resp 18   Ht 1.702 m (5' 7\")   Wt 82.6 kg (182 lb 1.6 oz)   SpO2 96%   BMI 28.52 kg/m²     I/O:   Intake/Output Summary (Last 24 hours) at 11/18/2021 0827  Last data filed at 11/17/2021 1745  Gross per 24 hour   Intake 870 ml   Output --   Net 870 ml       NAD  Breathing comfortably  Voice strong  Incision c/d/i with sutures, no hematoma    Labs:  Calcium:   6p: 8.9   12a: not done   6a:8.7      A/P: POD# 1  S/P parathyroid exploration   - doing well  - d/c home      Dottie Kemp M.D.  [facility]  [phone]          "

## 2021-11-18 NOTE — DISCHARGE SUMMARY
Discharge Summary      DATE OF ADMISSION: 11/17/2021    DATE OF DISCHARGE: 11/18/2021    DISCHARGE DIAGNOSIS:  ? Primary hyperparathyroidism    DISCHARGE CONDITION:  ? good    CONSULTATIONS:  ? none    PROCEDURES:  ? 11/17/21: open parathyroid exploration    BRIEF HPI and HOSPITAL COURSE:  ? Admitted with above, see admission history and physical. Underwent procedure as above. On day of discharge, the patient has stable vital signs, tolerating a regular diet, and pain is well controlled with PO meds. The patient is stable for discharge to home.    DISCHARGE MEDICATIONS     Medication List      START taking these medications      Instructions   HYDROcodone-acetaminophen 5-325 MG Tabs per tablet  Commonly known as: Norco   Take 1 Tablet by mouth every four hours as needed for up to 7 days.  Dose: 1 Tablet     Tums Ultra 1000 1000 MG Chew  Generic drug: Calcium Carbonate Antacid   Chew 2 Tablets every morning.  Dose: 2,000 mg        CONTINUE taking these medications      Instructions   losartan 50 MG Tabs  Commonly known as: COZAAR   Take 1 tablet by mouth every day.  Dose: 50 mg                Post-Operative Discharge Instructions for Parathyroidectomy     ACTIVITY:   • Most patients are able to return to a full-time work schedule in 1-2 weeks; however this may vary according to your job. It may take longer to return to heavy physical or other demanding work, or shorter if you are feeling well.    • Do NOT drive a car until you are able to turn the neck side to side, which may take 1-2 weeks.     • Do NOT drive while you are taking pain medicines.     DIET:   • You may have temporary throat discomfort or difficulty swallowing. This is due to the surgery around your larynx (voice box) and esophagus (swallowing tube). These symptoms will gradually improve over the course of several weeks.     • Drink and eat foods that can be swallowed easily, e.g. juice, soup, gelatin, applesauce, scrambled eggs or mashed potatoes.      • You may be able to return to your usual diet in a couple of days.     INCISION CARE:   • You may remove the outer dressing 48 hours after surgery.    • You may shower 48 hours after surgery but please do not swim or soak in a tub for at least 2 weeks. After you finish showering, just pat your incision dry. If it is draining clear fluid, you can cover it with a dry dressing (such as gauze). Do NOT scrub with soap or washcloth for the first 10 days.     • Mild swelling at the incision site will go away in 4-6 weeks. The pink line will slowly fade to white during the next 6-12 months.     • Use a sunscreen (SPF#30 or higher) or wear a scarf for protection if in the sun for the first 6 months to a year as the sun can darken your scar.     • You may begin to use a hypoallergenic moisturizing cream (no vitamin E, Mederma, or other “scar” creams) along the incision after 2 weeks.     COMMON PROBLEMS:   • Numbness of the skin under the chin or above the incision is normal and should go away in a few weeks.     • You may feel a lump or pressure in your throat sensation when swallowing for a few weeks.     • Your incision may feel itchy while it heals. Avoid rubbing or scratching if possible.     • You may feel neck stiffness, tightness, a pulling feeling, mild aching chest discomfort, headache, ear pain or congestion. Take a mild pain medicine such as Tylenol or Advil. Put heat on the area using a hot water bottle, heating pad or warm shower.     • Some people prefer to sleep with an extra pillow for the first few days after the surgery, this helps keep swelling around your incision to a minimum.     • Your voice may be hoarse or weak. Pitch or tone may change. You may have difficulty singing. This usually goes back to normal over 6 weeks to 6 months.     • After surgery, you may notice a change in your mood, emotional ups and downs, depression, irritability or fatigue and weakness. These changes will get better as  time passes.     • You do not need to be at bed rest, being active is normally well tolerated within reason.     MEDICATIONS:   Take your TUMS as directed. Signs of low blood calcium can be tingling in your hands or feet or around your mouth. If you experience these symptoms, take more TUMS. If the symptoms persist despite this, please call the physician.    Please note that it is common for the calcium level to be low following removal of the parathyroid, and you may experience numbness or tingling, which is a sign of low calcium. If this happens, it should improve when you take your calcium supplements. If it does not, please call your doctor.     • Please resume your pre-hospital medications. You should follow-up with your primary care physician regarding new prescriptions and refills.     • We will supply you with a prescription for a mild narcotic pain medication. You are not required to take it. If you do take it, please do not drive or drink alcohol as these in combination may make you drowsy. Most patients do not need strong pain medicine by the time you leave the hospital. You can take Tylenol (acetaminophen) or ibuprofen (e.g. Advil) as needed.     • If you are taking supplemental calcium or narcotics, you may also want to take a stool softener, such as over-the-counter Colace or Senna, to avoid constipation. Stay hydrated by drinking some extra water.     LABORATORY DRAW:  You have been provided with a prescription to have you calcium levels drawn prior to your follow up visit. The order is at the laboratory that you designated at as your lab of choice at the office. Please have this drawn ~2 days prior to your follow-up visit.    CALL YOUR DOCTOR IF:   • Call your doctor or go to the Emergency Room if you have fever (temperature greater than 100.5), chills, lightheadedness, shortness of breath, difficulty breathing, nausea, vomiting, numbness or tingling in your fingers, hands, or mouth, muscle spasms,  or if you notice signs of wound infection (redness, tenderness, or drainage from the incision). Please also call or go to the Emergency Room if you have any other urgent concerns.     FOLLOW-UP:   With Dr. Kemp in one week, call to schedule, [phone].    Dottie Kemp M.D.  [facility]    [address] [place] [address] [address]  [place] [id number]

## 2021-11-22 ENCOUNTER — TELEPHONE (OUTPATIENT)
Dept: HEALTH INFORMATION MANAGEMENT | Facility: OTHER | Age: 76
End: 2021-11-22

## 2021-11-22 NOTE — TELEPHONE ENCOUNTER
Outreach call to mbr to follow up on hospital discharge, no answer, left VM requesting call back.

## 2021-11-24 NOTE — TELEPHONE ENCOUNTER
"Received return incoming call from mbr. She reported she is \"doing pretty good\" and commented that she is  \"almost back to normal activity.\" She reported she has follow up on 11/30/21 and will see her kidney doctor 12/1/21. PCP appt is scheduled in Feb but mbr is open to sooner appt. Follow up appt schedule with PCP 12/7/21. Mbr does not feel she needs [employer] benefits at this time but will follow up with LSW if there are any changes.   "

## 2021-11-29 ENCOUNTER — HOSPITAL ENCOUNTER (OUTPATIENT)
Dept: LAB | Facility: MEDICAL CENTER | Age: 76
End: 2021-11-29
Attending: INTERNAL MEDICINE
Payer: MEDICARE

## 2021-11-29 ENCOUNTER — HOSPITAL ENCOUNTER (OUTPATIENT)
Dept: LAB | Facility: MEDICAL CENTER | Age: 76
End: 2021-11-29
Attending: STUDENT IN AN ORGANIZED HEALTH CARE EDUCATION/TRAINING PROGRAM
Payer: MEDICARE

## 2021-11-29 DIAGNOSIS — I10 ESSENTIAL HYPERTENSION: ICD-10-CM

## 2021-11-29 DIAGNOSIS — E78.5 HYPERLIPIDEMIA, UNSPECIFIED HYPERLIPIDEMIA TYPE: ICD-10-CM

## 2021-11-29 DIAGNOSIS — E21.3 HYPERPARATHYROIDISM (HCC): ICD-10-CM

## 2021-11-29 DIAGNOSIS — E83.52 HYPERCALCEMIA: ICD-10-CM

## 2021-11-29 DIAGNOSIS — N18.30 STAGE 3 CHRONIC KIDNEY DISEASE, UNSPECIFIED WHETHER STAGE 3A OR 3B CKD: ICD-10-CM

## 2021-11-29 LAB
ALBUMIN SERPL BCP-MCNC: 4.1 G/DL (ref 3.2–4.9)
ALBUMIN/GLOB SERPL: 2.1 G/DL
ALP SERPL-CCNC: 125 U/L (ref 30–99)
ALT SERPL-CCNC: 18 U/L (ref 2–50)
ANION GAP SERPL CALC-SCNC: 12 MMOL/L (ref 7–16)
ANION GAP SERPL CALC-SCNC: 13 MMOL/L (ref 7–16)
AST SERPL-CCNC: 14 U/L (ref 12–45)
BILIRUB SERPL-MCNC: 0.3 MG/DL (ref 0.1–1.5)
BUN SERPL-MCNC: 16 MG/DL (ref 8–22)
BUN SERPL-MCNC: 16 MG/DL (ref 8–22)
CALCIUM SERPL-MCNC: 7.5 MG/DL (ref 8.5–10.5)
CALCIUM SERPL-MCNC: 7.6 MG/DL (ref 8.5–10.5)
CHLORIDE SERPL-SCNC: 106 MMOL/L (ref 96–112)
CHLORIDE SERPL-SCNC: 107 MMOL/L (ref 96–112)
CHOLEST SERPL-MCNC: 150 MG/DL (ref 100–199)
CO2 SERPL-SCNC: 20 MMOL/L (ref 20–33)
CO2 SERPL-SCNC: 20 MMOL/L (ref 20–33)
CREAT SERPL-MCNC: 0.91 MG/DL (ref 0.5–1.4)
CREAT SERPL-MCNC: 0.94 MG/DL (ref 0.5–1.4)
CREAT UR-MCNC: 187.85 MG/DL
ERYTHROCYTE [DISTWIDTH] IN BLOOD BY AUTOMATED COUNT: 45.1 FL (ref 35.9–50)
GLOBULIN SER CALC-MCNC: 2 G/DL (ref 1.9–3.5)
GLUCOSE SERPL-MCNC: 94 MG/DL (ref 65–99)
GLUCOSE SERPL-MCNC: 95 MG/DL (ref 65–99)
HCT VFR BLD AUTO: 38.6 % (ref 37–47)
HDLC SERPL-MCNC: 45 MG/DL
HGB BLD-MCNC: 12.7 G/DL (ref 12–16)
LDLC SERPL CALC-MCNC: 93 MG/DL
MCH RBC QN AUTO: 31.3 PG (ref 27–33)
MCHC RBC AUTO-ENTMCNC: 32.9 G/DL (ref 33.6–35)
MCV RBC AUTO: 95.1 FL (ref 81.4–97.8)
MICROALBUMIN UR-MCNC: <1.2 MG/DL
MICROALBUMIN/CREAT UR: NORMAL MG/G (ref 0–30)
PLATELET # BLD AUTO: 332 K/UL (ref 164–446)
PMV BLD AUTO: 10.3 FL (ref 9–12.9)
POTASSIUM SERPL-SCNC: 4.2 MMOL/L (ref 3.6–5.5)
POTASSIUM SERPL-SCNC: 4.3 MMOL/L (ref 3.6–5.5)
PROT SERPL-MCNC: 6.1 G/DL (ref 6–8.2)
PTH-INTACT SERPL-MCNC: 39.5 PG/ML (ref 14–72)
RBC # BLD AUTO: 4.06 M/UL (ref 4.2–5.4)
SODIUM SERPL-SCNC: 139 MMOL/L (ref 135–145)
SODIUM SERPL-SCNC: 139 MMOL/L (ref 135–145)
TRIGL SERPL-MCNC: 59 MG/DL (ref 0–149)
WBC # BLD AUTO: 9.7 K/UL (ref 4.8–10.8)

## 2021-11-29 PROCEDURE — 80053 COMPREHEN METABOLIC PANEL: CPT

## 2021-11-29 PROCEDURE — 80048 BASIC METABOLIC PNL TOTAL CA: CPT

## 2021-11-29 PROCEDURE — 83970 ASSAY OF PARATHORMONE: CPT

## 2021-11-29 PROCEDURE — 85027 COMPLETE CBC AUTOMATED: CPT

## 2021-11-29 PROCEDURE — 80061 LIPID PANEL: CPT

## 2021-11-29 PROCEDURE — 82043 UR ALBUMIN QUANTITATIVE: CPT

## 2021-11-29 PROCEDURE — 36415 COLL VENOUS BLD VENIPUNCTURE: CPT

## 2021-11-29 PROCEDURE — 82570 ASSAY OF URINE CREATININE: CPT

## 2021-11-30 ENCOUNTER — HOSPITAL ENCOUNTER (OUTPATIENT)
Dept: LAB | Facility: MEDICAL CENTER | Age: 76
End: 2021-11-30
Attending: SURGERY
Payer: MEDICARE

## 2021-11-30 ENCOUNTER — TELEPHONE (OUTPATIENT)
Dept: MEDICAL GROUP | Facility: PHYSICIAN GROUP | Age: 76
End: 2021-11-30

## 2021-11-30 LAB — CALCIUM SERPL-MCNC: 8.2 MG/DL (ref 8.5–10.5)

## 2021-11-30 PROCEDURE — 36415 COLL VENOUS BLD VENIPUNCTURE: CPT

## 2021-11-30 PROCEDURE — 82310 ASSAY OF CALCIUM: CPT

## 2021-11-30 NOTE — TELEPHONE ENCOUNTER
ESTABLISHED PATIENT PRE-VISIT PLANNING     Patient was NOT contacted to complete PVP.     Note: Patient will not be contacted if there is no indication to call.     1.  Reviewed notes from the last few office visits within the medical group: Yes    2.  If any orders were placed at last visit or intended to be done for this visit (i.e. 6 mos follow-up), do we have Results/Consult Notes?         •  Labs - Labs were not ordered at last office visit.  Note: If patient appointment is for lab review and patient did not complete labs, check with provider if OK to reschedule patient until labs completed.       •  Imaging - Imaging was not ordered at last office visit.       •  Referrals - No referrals were ordered at last office visit.    3. Is this appointment scheduled as a Hospital Follow-Up? No    4.  Immunizations were updated in Epic using Reconcile Outside Information activity? Yes    5.  Patient is due for the following Health Maintenance Topics:   There are no preventive care reminders to display for this patient.    6.  AHA (Pulse[employer]) form printed for Provider? Yes

## 2021-12-01 ENCOUNTER — APPOINTMENT (OUTPATIENT)
Dept: NEPHROLOGY | Facility: MEDICAL CENTER | Age: 76
End: 2021-12-01
Payer: MEDICARE

## 2021-12-01 ENCOUNTER — OFFICE VISIT (OUTPATIENT)
Dept: NEPHROLOGY | Facility: MEDICAL CENTER | Age: 76
End: 2021-12-01

## 2021-12-01 VITALS
WEIGHT: 184 LBS | DIASTOLIC BLOOD PRESSURE: 76 MMHG | OXYGEN SATURATION: 100 % | HEART RATE: 57 BPM | HEIGHT: 67 IN | TEMPERATURE: 97.6 F | SYSTOLIC BLOOD PRESSURE: 124 MMHG | BODY MASS INDEX: 28.88 KG/M2

## 2021-12-01 DIAGNOSIS — I10 PRIMARY HYPERTENSION: ICD-10-CM

## 2021-12-01 DIAGNOSIS — E83.52 HYPERCALCEMIA: ICD-10-CM

## 2021-12-01 DIAGNOSIS — E83.51 HYPOCALCEMIA: ICD-10-CM

## 2021-12-01 DIAGNOSIS — E21.3 HYPERPARATHYROIDISM (HCC): ICD-10-CM

## 2021-12-01 PROCEDURE — 99214 OFFICE O/P EST MOD 30 MIN: CPT | Performed by: INTERNAL MEDICINE

## 2021-12-01 ASSESSMENT — ENCOUNTER SYMPTOMS
HYPERTENSION: 1
COUGH: 0
NAUSEA: 0
FEVER: 0
SHORTNESS OF BREATH: 0
VOMITING: 0
CHILLS: 0

## 2021-12-01 ASSESSMENT — FIBROSIS 4 INDEX: FIB4 SCORE: 0.74

## 2021-12-01 NOTE — PROGRESS NOTES
"Subjective     Katy Perez is a 76 y.o. female who presents with Hypertension and Chronic Kidney Disease            Patient is a pleasant 76-year-old lady with recently diagnosed hypercalcemia, secondary to hyperparathyroidism.  Patient had parathyroidectomy 2 weeks ago, she is doing well    Hypertension  This is a chronic problem. The current episode started more than 1 year ago. The problem is unchanged. The problem is controlled. Pertinent negatives include no chest pain, malaise/fatigue, peripheral edema or shortness of breath. Risk factors for coronary artery disease include post-menopausal state. Past treatments include angiotensin blockers. The current treatment provides significant improvement. There are no compliance problems.    Chronic Kidney Disease  This is a new problem. The current episode started more than 1 month ago. The problem occurs constantly. The problem has been rapidly improving. Pertinent negatives include no chest pain, chills, coughing, fever, nausea, urinary symptoms or vomiting.       Review of Systems   Constitutional: Negative for chills, fever and malaise/fatigue.   Respiratory: Negative for cough and shortness of breath.    Cardiovascular: Negative for chest pain and leg swelling.   Gastrointestinal: Negative for nausea and vomiting.   Genitourinary: Negative for dysuria, frequency and urgency.              Objective     /76 (BP Location: Right arm, Patient Position: Sitting, BP Cuff Size: Adult)   Pulse (!) 57   Temp 36.4 °C (97.6 °F) (Temporal)   Ht 1.702 m (5' 7\")   Wt 83.5 kg (184 lb)   SpO2 100%   BMI 28.82 kg/m²      Physical Exam  Vitals and nursing note reviewed.   Constitutional:       General: She is not in acute distress.     Appearance: Normal appearance. She is well-developed. She is not diaphoretic.   HENT:      Head: Normocephalic and atraumatic.      Right Ear: External ear normal.      Left Ear: External ear normal.      Nose: Nose normal. "   Eyes:      General: No scleral icterus.        Right eye: No discharge.         Left eye: No discharge.      Conjunctiva/sclera: Conjunctivae normal.   Cardiovascular:      Rate and Rhythm: Normal rate and regular rhythm.      Heart sounds: No murmur heard.      Pulmonary:      Effort: Pulmonary effort is normal. No respiratory distress.      Breath sounds: Normal breath sounds.   Musculoskeletal:         General: No tenderness.      Right lower leg: No edema.      Left lower leg: No edema.   Skin:     General: Skin is warm and dry.      Findings: No erythema.   Neurological:      General: No focal deficit present.      Mental Status: She is alert and oriented to person, place, and time.      Cranial Nerves: No cranial nerve deficit.   Psychiatric:         Mood and Affect: Mood normal.         Behavior: Behavior normal.       Past Medical History:   Diagnosis Date   • Arthritis 5/16/2012    bilateral knees   • Blood clotting disorder (HCC)     DVT and PE s/p bunion surgery 2017   • Hypertension     states well controlled on meds   • MEDICAL HOME 11/8/12   • Pain     chronic foot pain   • Renal disorder     CKD stage 3     Social History     Socioeconomic History   • Marital status:      Spouse name: Not on file   • Number of children: Not on file   • Years of education: Not on file   • Highest education level: Some college, no degree   Occupational History   • Not on file   Tobacco Use   • Smoking status: Never Smoker   • Smokeless tobacco: Never Used   Vaping Use   • Vaping Use: Never used   Substance and Sexual Activity   • Alcohol use: Yes     Alcohol/week: 4.2 - 6.0 oz     Types: 7 - 10 Glasses of wine per week   • Drug use: Yes     Frequency: 1.0 times per week     Types: Marijuana, Oral     Comment: smokes marijuana couple times a week; CBD oil  a couple times a week   • Sexual activity: Not Currently     Partners: Male   Other Topics Concern   • Not on file   Social History Narrative   • Not on file      Social Determinants of Health     Financial Resource Strain: Low Risk    • Difficulty of Paying Living Expenses: Not hard at all   Food Insecurity: No Food Insecurity   • Worried About Running Out of Food in the Last Year: Never true   • Ran Out of Food in the Last Year: Never true   Transportation Needs: No Transportation Needs   • Lack of Transportation (Medical): No   • Lack of Transportation (Non-Medical): No   Physical Activity: Sufficiently Active   • Days of Exercise per Week: 7 days   • Minutes of Exercise per Session: 120 min   Stress: No Stress Concern Present   • Feeling of Stress : Only a little   Social Connections: Moderately Isolated   • Frequency of Communication with Friends and Family: Once a week   • Frequency of Social Gatherings with Friends and Family: Never   • Attends Zoroastrianism Services: Never   • Active Member of Clubs or Organizations: Yes   • Attends Club or Organization Meetings: More than 4 times per year   • Marital Status:    Intimate Partner Violence:    • Fear of Current or Ex-Partner: Not on file   • Emotionally Abused: Not on file   • Physically Abused: Not on file   • Sexually Abused: Not on file   Housing Stability: Low Risk    • Unable to Pay for Housing in the Last Year: No   • Number of Places Lived in the Last Year: 1   • Unstable Housing in the Last Year: No     Family History   Problem Relation Age of Onset   • Hypertension Father    • Heart Attack Father    • Heart Failure Mother    • Breast Cancer Maternal Grandmother    • No Known Problems Sister    • Heart Attack Maternal Grandfather    • No Known Problems Paternal Grandmother    • No Known Problems Paternal Grandfather      Recent Labs     06/17/21  1359 11/17/21  1035 11/17/21  1835 11/18/21  0515 11/29/21  0611 11/29/21  0612   ALBUMIN   < >  --  4.0 3.6 4.1  --    HDL  --   --   --   --  45  --    TRIGLYCERIDE  --   --   --   --  59  --    SODIUM  --  141  --   --  139 139   POTASSIUM  --  4.3  --   --   4.2 4.3   CHLORIDE  --  107  --   --  106 107   CO2  --  23  --   --  20 20   BUN  --  17  --   --  16 16   CREATININE  --  0.81  --   --  0.94 0.91    < > = values in this interval not displayed.                             Assessment & Plan        1. Hyperparathyroidism (HCC)  Underwent parathyroid adenoma resection  No complication  I appreciate Dr. Kemp's help    2. Hypercalcemia  Improved    3. Hypocalcemia  Patient was advised to take her calcium supplement empty stomach    4. Primary hypertension  Controlled  Continue same medication regimen  Continue low-sodium diet

## 2021-12-07 ENCOUNTER — OFFICE VISIT (OUTPATIENT)
Dept: MEDICAL GROUP | Facility: PHYSICIAN GROUP | Age: 76
End: 2021-12-07
Payer: MEDICARE

## 2021-12-07 VITALS
RESPIRATION RATE: 16 BRPM | DIASTOLIC BLOOD PRESSURE: 60 MMHG | HEIGHT: 67 IN | TEMPERATURE: 97.7 F | HEART RATE: 63 BPM | BODY MASS INDEX: 28.53 KG/M2 | OXYGEN SATURATION: 98 % | SYSTOLIC BLOOD PRESSURE: 124 MMHG | WEIGHT: 181.8 LBS

## 2021-12-07 DIAGNOSIS — Z86.39 HISTORY OF VITAMIN D DEFICIENCY: ICD-10-CM

## 2021-12-07 DIAGNOSIS — E89.2 S/P REMOVAL OF PARATHYROID GLAND: ICD-10-CM

## 2021-12-07 DIAGNOSIS — E83.51 HYPOCALCEMIA: ICD-10-CM

## 2021-12-07 DIAGNOSIS — R74.8 ELEVATED ALKALINE PHOSPHATASE LEVEL: ICD-10-CM

## 2021-12-07 PROBLEM — I87.9 DISORDER OF VEIN: Status: ACTIVE | Noted: 2021-12-07

## 2021-12-07 PROBLEM — Z98.890 S/P REMOVAL OF PARATHYROID GLAND: Status: ACTIVE | Noted: 2021-12-07

## 2021-12-07 PROBLEM — Z90.89 S/P REMOVAL OF PARATHYROID GLAND: Status: ACTIVE | Noted: 2021-12-07

## 2021-12-07 PROBLEM — M25.50 JOINT PAIN: Status: ACTIVE | Noted: 2021-12-07

## 2021-12-07 PROBLEM — K76.9 DISEASE OF LIVER: Status: ACTIVE | Noted: 2021-12-07

## 2021-12-07 PROCEDURE — 99213 OFFICE O/P EST LOW 20 MIN: CPT | Performed by: STUDENT IN AN ORGANIZED HEALTH CARE EDUCATION/TRAINING PROGRAM

## 2021-12-07 RX ORDER — LOSARTAN POTASSIUM 50 MG/1
TABLET ORAL
COMMUNITY
End: 2021-12-07

## 2021-12-07 ASSESSMENT — FIBROSIS 4 INDEX: FIB4 SCORE: 0.74

## 2021-12-07 NOTE — PROGRESS NOTES
"Subjective:     CC: Follow-up    HPI:   Katy presents today to follow-up after her parathyroidectomy.    Patient is happy to report that she has been doing excellent since her parathyroidectomy. She is healing well. She had recent follow-up with her nephrologist, they did not recommend any repeat labs unless she has symptoms of hypo-/hypercalcemia. Denies any numbness, tingling or spasm. Does state that her energy has been great and she has been able to do a lot of yard work without any pain. Very happy with the results. Has been taking her calcium on an empty stomach as directed.    Current Outpatient Medications Ordered in Epic   Medication Sig Dispense Refill   • Calcium Carbonate Antacid (TUMS ULTRA 1000) 1000 MG Chew Tab Chew 2 Tablets every morning. 90 Tablet 3   • losartan (COZAAR) 50 MG Tab Take 1 tablet by mouth every day. 100 tablet 3     No current Epic-ordered facility-administered medications on file.       ROS:  Gen: no fevers/chills, no changes in weight  ENT: no sore throat  Pulm: no sob, no cough  CV: no chest pain, no palpitations  GI: no nausea/vomiting, no diarrhea  MSk: no myalgias  Skin: no rash  Neuro: no headaches, no numbness/tingling  Heme/Lymph: no easy bruising    Objective:     Exam:  /60 (BP Location: Right arm, Patient Position: Sitting, BP Cuff Size: Adult)   Pulse 63   Temp 36.5 °C (97.7 °F) (Temporal)   Resp 16   Ht 1.702 m (5' 7\")   Wt 82.5 kg (181 lb 12.8 oz)   SpO2 98%   BMI 28.47 kg/m²  Body mass index is 28.47 kg/m².    Physical Exam:  Constitutional: Well-developed and well-nourished. No acute distress.   Skin: Skin is warm and dry. No rash noted.  Head: Atraumatic without lesions.  Eyes: Conjunctivae and extraocular motions are normal. Pupils are equal, round. No scleral icterus.   Mouth/Throat: Wearing mask  Neck: Supple, trachea midline.  Cardiovascular: Regular rate and rhythm, S1 and S2 without murmur, rubs, or gallops.  Lungs: Normal inspiratory effort, CTA " bilaterally, no wheezes/rhonchi/rales  Abdomen: Soft nondistended, no tenderness or masses.  Extremities: No cyanosis, clubbing, erythema, nor edema.  Neurological: Alert and oriented x 3. No gross/focal deficits.  Psychiatric:  Behavior, mood, and affect are appropriate.    Labs: Reviewed from 11/29/2021, 11/30/2021    Assessment & Plan:     76 y.o. female with the following -     1. Hypocalcemia  - Comp Metabolic Panel; Future  2. S/P removal of parathyroid gland (HCC)  Patient with chronic history of hypercalcemia now resolved after parathyroidectomy, PTH down trended to normal with mild and improving hypocalcemia. We will go ahead and trend this in the next few weeks along with alkaline phosphatase as below. Patient to come in sooner if she develops symptoms. Continue calcium supplement.    3. History of vitamin D deficiency  Okay to restart vitamin D supplement, average 800 to 1000 international units daily.    4. Elevated alkaline phosphatase level  This is a chronic condition. I expect that this will improve now that her hyperparathyroidism has been treated. We will go ahead and trend in the next few weeks along with GGT to exclude liver causes.  - Comp Metabolic Panel; Future  - GAMMA GT (GGT); Future    No follow-ups on file.    Please note that this dictation was created using voice recognition software. I have made every reasonable attempt to correct obvious errors, but I expect that there are errors of grammar and possibly content that I did not discover before finalizing the note.

## 2022-02-15 ENCOUNTER — TELEPHONE (OUTPATIENT)
Dept: MEDICAL GROUP | Facility: PHYSICIAN GROUP | Age: 77
End: 2022-02-15

## 2022-02-15 NOTE — TELEPHONE ENCOUNTER
ESTABLISHED PATIENT PRE-VISIT PLANNING     Patient was contacted to complete PVP.     Note: Patient will not be contacted if there is no indication to call.     1.  Reviewed notes from the last few office visits within the medical group: Yes    2.  If any orders were placed at last visit or intended to be done for this visit (i.e. 6 mos follow-up), do we have Results/Consult Notes?         •  Labs - Labs ordered, NOT completed. Patient advised to complete prior to next appointment.  Note: If patient appointment is for lab review and patient did not complete labs, check with provider if OK to reschedule patient until labs completed.       •  Imaging - Imaging was not ordered at last office visit.       •  Referrals - No referrals were ordered at last office visit.    3. Is this appointment scheduled as a Hospital Follow-Up? No    4.  Immunizations were updated in Epic using Reconcile Outside Information activity? Yes    5.  Patient is due for the following Health Maintenance Topics:   There are no preventive care reminders to display for this patient.    6.  AHA (Pulse8) form printed for Provider? Yes

## 2022-02-23 ENCOUNTER — HOSPITAL ENCOUNTER (OUTPATIENT)
Dept: LAB | Facility: MEDICAL CENTER | Age: 77
End: 2022-02-23
Attending: INTERNAL MEDICINE
Payer: MEDICARE

## 2022-02-23 ENCOUNTER — HOSPITAL ENCOUNTER (OUTPATIENT)
Dept: LAB | Facility: MEDICAL CENTER | Age: 77
End: 2022-02-23
Attending: STUDENT IN AN ORGANIZED HEALTH CARE EDUCATION/TRAINING PROGRAM
Payer: MEDICARE

## 2022-02-23 DIAGNOSIS — E83.52 HYPERCALCEMIA: ICD-10-CM

## 2022-02-23 DIAGNOSIS — E21.3 HYPERPARATHYROIDISM (HCC): ICD-10-CM

## 2022-02-23 DIAGNOSIS — R74.8 ELEVATED ALKALINE PHOSPHATASE LEVEL: ICD-10-CM

## 2022-02-23 DIAGNOSIS — E83.51 HYPOCALCEMIA: ICD-10-CM

## 2022-02-23 LAB
ALBUMIN SERPL BCP-MCNC: 4.4 G/DL (ref 3.2–4.9)
ALBUMIN/GLOB SERPL: 1.9 G/DL
ALP SERPL-CCNC: 98 U/L (ref 30–99)
ALT SERPL-CCNC: 20 U/L (ref 2–50)
ANION GAP SERPL CALC-SCNC: 10 MMOL/L (ref 7–16)
ANION GAP SERPL CALC-SCNC: 10 MMOL/L (ref 7–16)
AST SERPL-CCNC: 17 U/L (ref 12–45)
BILIRUB SERPL-MCNC: 0.5 MG/DL (ref 0.1–1.5)
BUN SERPL-MCNC: 13 MG/DL (ref 8–22)
BUN SERPL-MCNC: 13 MG/DL (ref 8–22)
CALCIUM SERPL-MCNC: 8.9 MG/DL (ref 8.5–10.5)
CALCIUM SERPL-MCNC: 9 MG/DL (ref 8.5–10.5)
CHLORIDE SERPL-SCNC: 106 MMOL/L (ref 96–112)
CHLORIDE SERPL-SCNC: 106 MMOL/L (ref 96–112)
CO2 SERPL-SCNC: 25 MMOL/L (ref 20–33)
CO2 SERPL-SCNC: 25 MMOL/L (ref 20–33)
CREAT SERPL-MCNC: 0.99 MG/DL (ref 0.5–1.4)
CREAT SERPL-MCNC: 1 MG/DL (ref 0.5–1.4)
CREAT UR-MCNC: 182.24 MG/DL
ERYTHROCYTE [DISTWIDTH] IN BLOOD BY AUTOMATED COUNT: 47.4 FL (ref 35.9–50)
GGT SERPL-CCNC: 13 U/L (ref 7–34)
GLOBULIN SER CALC-MCNC: 2.3 G/DL (ref 1.9–3.5)
GLUCOSE SERPL-MCNC: 97 MG/DL (ref 65–99)
GLUCOSE SERPL-MCNC: 97 MG/DL (ref 65–99)
HCT VFR BLD AUTO: 43.7 % (ref 37–47)
HGB BLD-MCNC: 14.2 G/DL (ref 12–16)
MCH RBC QN AUTO: 30.8 PG (ref 27–33)
MCHC RBC AUTO-ENTMCNC: 32.5 G/DL (ref 33.6–35)
MCV RBC AUTO: 94.8 FL (ref 81.4–97.8)
MICROALBUMIN UR-MCNC: <1.2 MG/DL
MICROALBUMIN/CREAT UR: NORMAL MG/G (ref 0–30)
PLATELET # BLD AUTO: 288 K/UL (ref 164–446)
PMV BLD AUTO: 10.8 FL (ref 9–12.9)
POTASSIUM SERPL-SCNC: 4 MMOL/L (ref 3.6–5.5)
POTASSIUM SERPL-SCNC: 4 MMOL/L (ref 3.6–5.5)
PROT SERPL-MCNC: 6.7 G/DL (ref 6–8.2)
RBC # BLD AUTO: 4.61 M/UL (ref 4.2–5.4)
SODIUM SERPL-SCNC: 141 MMOL/L (ref 135–145)
SODIUM SERPL-SCNC: 141 MMOL/L (ref 135–145)
WBC # BLD AUTO: 7.5 K/UL (ref 4.8–10.8)

## 2022-02-23 PROCEDURE — 82977 ASSAY OF GGT: CPT

## 2022-02-23 PROCEDURE — 80048 BASIC METABOLIC PNL TOTAL CA: CPT

## 2022-02-23 PROCEDURE — 85027 COMPLETE CBC AUTOMATED: CPT

## 2022-02-23 PROCEDURE — 36415 COLL VENOUS BLD VENIPUNCTURE: CPT

## 2022-02-23 PROCEDURE — 82043 UR ALBUMIN QUANTITATIVE: CPT

## 2022-02-23 PROCEDURE — 82570 ASSAY OF URINE CREATININE: CPT

## 2022-02-23 PROCEDURE — 80053 COMPREHEN METABOLIC PANEL: CPT

## 2022-02-24 ENCOUNTER — APPOINTMENT (OUTPATIENT)
Dept: RADIOLOGY | Facility: IMAGING CENTER | Age: 77
End: 2022-02-24
Attending: STUDENT IN AN ORGANIZED HEALTH CARE EDUCATION/TRAINING PROGRAM
Payer: MEDICARE

## 2022-02-24 ENCOUNTER — OFFICE VISIT (OUTPATIENT)
Dept: MEDICAL GROUP | Facility: PHYSICIAN GROUP | Age: 77
End: 2022-02-24
Payer: MEDICARE

## 2022-02-24 VITALS
BODY MASS INDEX: 29.35 KG/M2 | WEIGHT: 187 LBS | HEIGHT: 67 IN | SYSTOLIC BLOOD PRESSURE: 150 MMHG | TEMPERATURE: 98 F | OXYGEN SATURATION: 99 % | HEART RATE: 60 BPM | DIASTOLIC BLOOD PRESSURE: 80 MMHG

## 2022-02-24 DIAGNOSIS — M85.852 OSTEOPENIA OF LEFT HIP: ICD-10-CM

## 2022-02-24 DIAGNOSIS — N18.31 STAGE 3A CHRONIC KIDNEY DISEASE: ICD-10-CM

## 2022-02-24 DIAGNOSIS — I70.0 AORTIC ATHEROSCLEROSIS (HCC): ICD-10-CM

## 2022-02-24 DIAGNOSIS — M25.512 ACUTE PAIN OF LEFT SHOULDER: ICD-10-CM

## 2022-02-24 DIAGNOSIS — E89.2 S/P REMOVAL OF PARATHYROID GLAND: ICD-10-CM

## 2022-02-24 DIAGNOSIS — Z86.39 HISTORY OF VITAMIN D DEFICIENCY: ICD-10-CM

## 2022-02-24 DIAGNOSIS — I10 ESSENTIAL HYPERTENSION: ICD-10-CM

## 2022-02-24 PROBLEM — E83.51 HYPOCALCEMIA: Status: RESOLVED | Noted: 2021-12-07 | Resolved: 2022-02-24

## 2022-02-24 PROCEDURE — 73030 X-RAY EXAM OF SHOULDER: CPT | Mod: TC,LT | Performed by: STUDENT IN AN ORGANIZED HEALTH CARE EDUCATION/TRAINING PROGRAM

## 2022-02-24 PROCEDURE — 99213 OFFICE O/P EST LOW 20 MIN: CPT | Performed by: STUDENT IN AN ORGANIZED HEALTH CARE EDUCATION/TRAINING PROGRAM

## 2022-02-24 ASSESSMENT — PATIENT HEALTH QUESTIONNAIRE - PHQ9: CLINICAL INTERPRETATION OF PHQ2 SCORE: 0

## 2022-02-24 ASSESSMENT — FIBROSIS 4 INDEX: FIB4 SCORE: 0.98

## 2022-02-24 NOTE — PATIENT INSTRUCTIONS
<130/80 ideal, at least <140/90    Voltaren gel 1% 2-4 grams up to four times a day (max 32g/day)  Tylenol, 3000mg max a day

## 2022-02-24 NOTE — PROGRESS NOTES
"Subjective:     Chief Complaint   Patient presents with   • Follow-Up     HPI:   Katy presents today with    Essential hypertension  Chronic. Typically normal so not checking at home. Reports compliance to medication regimen.    Patient reports to be doing great post parathyroidectomy, states there has been great improvement in activity level and muscle/joint pain.    She does however state she slipped on ice 1/1/2022 and ever since has had left shoulder pain. Patient is LHD. She uses that arm for throwing hay. Thought it would improve, but not much better. No weakness, numbness or tingling. No edema or bruising at time of injury. Reports poor ROM since.     Current Outpatient Medications Ordered in Epic   Medication Sig Dispense Refill   • Calcium Carbonate Antacid (TUMS ULTRA 1000) 1000 MG Chew Tab Chew 2 Tablets every morning. 90 Tablet 3   • losartan (COZAAR) 50 MG Tab Take 1 tablet by mouth every day. 100 tablet 3     No current Epic-ordered facility-administered medications on file.     ROS:  Gen: no fevers/chills, no changes in weight  Eyes: no changes in vision  ENT: no sore throat  Pulm: no sob, no cough  CV: no chest pain, no palpitations  GI: no nausea/vomiting, no diarrhea  : no dysuria  Skin: no rash  Neuro: no headaches, no numbness/tingling  Heme/Lymph: no easy bruising    Objective:     Exam:  /80   Pulse 60   Temp 36.7 °C (98 °F) (Temporal)   Ht 1.702 m (5' 7\")   Wt 84.8 kg (187 lb)   SpO2 99%   BMI 29.29 kg/m²  Body mass index is 29.29 kg/m².    Physical Exam:  Constitutional: Well-developed and well-nourished. No acute distress.   Skin: Skin is warm and dry. No rash noted.  Head: Atraumatic without lesions.  Eyes: Conjunctivae and extraocular motions are normal. Pupils are equal, round. No scleral icterus.   Mouth/Throat: Wearing mask.  Neck: Supple, trachea midline.  Cardiovascular: Regular rate and rhythm, S1 and S2 without murmur, rubs, or gallops.  Lungs: Normal inspiratory " effort, CTA bilaterally, no wheezes/rhonchi/rales  Extremities: No cyanosis, clubbing, erythema, nor edema.  Musculoskeletal: Mild crepitis with passive ROM left shoulder. +neer, pain with mild decreased ROM with flexion/abduction. Negative brando lane. No ttp, edema or deformity. Pain without weakness with Pavon's and Empty can, negative push off.  Neurological: Alert and oriented x 3. No gross/focal deficits.  Psychiatric:  Behavior, mood, and affect are appropriate.    Labs: Reviewed from 2/23/2022    Assessment & Plan:     76 y.o. female with the following -     1. Essential hypertension  Chronic, well controlled typically. No changes to current treatment, patient to trend at home and let me know if out of range. Would increase losartan to 100mg if needed.    2. Stage 3a chronic kidney disease (HCC)  Chronic, stable. Trend in 6m. Ensure BP well controlled. Avoid oral NSAIDs.  - Basic Metabolic Panel; Future  - VITAMIN D,25 HYDROXY; Future    3. S/P removal of parathyroid gland (HCC)  Doing great, Ca now normal. Continue supplement.    4. Aortic atherosclerosis (HCC)  Noted on CT, trend LDL this November and consider medication if needed (has hx of stain SE).    5. Osteopenia of left hip  Due for repeat in September, will remind patient at f/u. Check vitamin D.  - VITAMIN D,25 HYDROXY; Future    6. History of vitamin D deficiency  - VITAMIN D,25 HYDROXY; Future    7. Acute pain of left shoulder  Acute, no fracture or dislocation. Referred to PT given lack of improvement with decreased ROM today. Ok to trial APAP or Voltaren gel, avoid oral NSAID given CKD.  - DX-SHOULDER 2+ LEFT; Future  - Referral to Physical Therapy    Return in about 6 months (around 8/24/2022), or if symptoms worsen or fail to improve.    Please note that this dictation was created using voice recognition software. I have made every reasonable attempt to correct obvious errors, but I expect that there are errors of grammar and possibly  content that I did not discover before finalizing the note.

## 2022-02-24 NOTE — PROGRESS NOTES
Annual Health Assessment Questions:    1.  Are you currently engaging in any exercise or physical activity? Yes    2.  How would you describe your mood or emotional well-being today? good    3.  Have you had any falls in the last year? No    4.  Have you noticed any problems with your balance or had difficulty walking? No    5.  In the last six months have you experienced any leakage of urine? Yes: moderate     6. DPA/Advanced Directive: Patient has Advanced Directive on file.

## 2022-03-11 VITALS — DIASTOLIC BLOOD PRESSURE: 72 MMHG | SYSTOLIC BLOOD PRESSURE: 123 MMHG

## 2022-04-05 ENCOUNTER — PHYSICAL THERAPY (OUTPATIENT)
Dept: PHYSICAL THERAPY | Facility: REHABILITATION | Age: 77
End: 2022-04-05
Attending: STUDENT IN AN ORGANIZED HEALTH CARE EDUCATION/TRAINING PROGRAM
Payer: MEDICARE

## 2022-04-05 DIAGNOSIS — M25.512 ACUTE PAIN OF LEFT SHOULDER: ICD-10-CM

## 2022-04-05 PROCEDURE — 97140 MANUAL THERAPY 1/> REGIONS: CPT

## 2022-04-05 PROCEDURE — 97161 PT EVAL LOW COMPLEX 20 MIN: CPT

## 2022-04-05 PROCEDURE — 97110 THERAPEUTIC EXERCISES: CPT

## 2022-04-05 NOTE — OP THERAPY EVALUATION
"  Outpatient Physical Therapy  INITIAL EVALUATION    [facility]  [address], [address]  [place] [address]  Phone:  [phone]    Date of Evaluation: 04/05/2022    Patient: Katy Perez  YOB: 1945  MRN: [id number]     Referring Provider: Dori Mason D.O.  [address]  [address]  [place] [address]   Referring Diagnosis No admission diagnoses are documented for this encounter.     Time Calculation  Start time: 0215  Stop time: 0300 Time Calculation (min): 45 minutes         Chief Complaint: No chief complaint on file.    Visit Diagnoses     ICD-10-CM   1. Acute pain of left shoulder  M25.512       Date of onset of impairment: 1/1/2022    Subjective:   History of Present Illness:     Date of onset:  1/1/2022    Mechanism of injury:  CMHx: From MD: \"she slipped on ice 1/1/2022 and ever since has had left shoulder pain a couple days after the fall that developed. Patient is LHD. She uses that arm for throwing hay. Thought it would improve, but not much better. No weakness, numbness or tingling. No edema or bruising at time of injury. Reports poor ROM since.\" MD took radiographs negative for fracture or significant findings and recommended PT at this time. She notes since the injury it has gotten better over time. 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 She notes she has been able to use it more but still has to use the R arm for a lot of work tasks on the farm.     Pain Behavior  Sxs: lateral shoulder pain with ache that feels deep, radiation to posterolateral upper arm. Denies other radiation or neck sxs. Crunching with no pain reported on this shoulder    Aggs: OH reaching goes away once she relaxes, reaching HBB. 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; 7 days/week    Goals: use of L arm with throwing hay/etc with 7-8 barrels per day; moving bags of feed 50                          Past Medical History:   Diagnosis Date   • Arthritis 5/16/2012    bilateral knees   • Blood clotting disorder (HCC)     DVT and PE s/p bunion surgery 2017   • Hypertension     states well controlled on meds   • Hypocalcemia 12/7/2021   • MEDICAL HOME 11/8/12   • Pain     chronic foot pain   • Renal disorder     CKD stage 3     Past Surgical History:   Procedure Laterality Date   • IA EXPLORE PARATHYROID GLANDS Right 11/17/2021    Procedure: PARATHYROIDECTOMY;  Surgeon: Dottie Kemp M.D.;  Location: SURGERY SAME DAY [facility];  Service: General   • BUNIONECTOMY DANK Right 1/10/2017    Procedure: BUNIONECTOMY DANK 2ND;  Surgeon: SHARMILA SmithPFiliMFili;  Location: SURGERY SURGICAL [facility];  Service:    • HAMMERTOE CORRECTION Right 1/10/2017    Procedure: HAMMERTOE CORRECTION;  Surgeon: SHARMILA SmithPFiliMFili;  Location: SURGERY SURGICAL [facility];  Service:    • OTHER ORTHOPEDIC SURGERY  2017    bunion   • KNEE ARTHROPLASTY TOTAL  11/5/2013    Performed by Luis Faulkner M.D. at SURGERY [facility] ORS   • GYN SURGERY  1977    tubal   • OTHER  1970    breast implants [facility]   • IA BREAST AUGMENTATION WITH IMPLANT       Social History     Tobacco Use   • Smoking status: Never Smoker   • Smokeless tobacco: Never Used   Substance Use Topics   • Alcohol use: Yes     Alcohol/week: 4.2 - 6.0 oz     Types: 7 - 10 Glasses of wine per week     Family and Occupational History     Socioeconomic History   • Marital  status:      Spouse name: Not on file   • Number of children: Not on file   • Years of education: Not on file   • Highest education level: Some college, no degree   Occupational History   • Not on file       Objective     General Comments     Shoulder Comments   Standing/Sitting:      Shoulder AROM L/R  Flexion: 160* (painful arc), 180  Abduction: 160** (painful arc), 180  ER: 70*, 90  FIR: deferred  ROBINA: deferred    MMT L/R  Elbow flexion: 5/5   Elbow extension: 5/5  Shoulder ER: 4+*/5, 5/5  Shoulder IR: 4+*/5, 5/5  Shoulder ABD: 4*/5, 5/5  Shoulder flex: deferred    Supine:  PROM L/R  Flexion: 160*, 180 (slight guarded end feel)  Abduction: 150*, 180  (slight guarding)  ER: WFL  IR: WFL  90/90 ER: 85*, 90  90/90 IR: 70,70          Therapeutic Exercises (CPT 08449):     1. UPOC 6/5/22      Therapeutic Exercise Summary: Home Exercise Program Created and Reviewed with patient    Wall walks x10  Isometric ABD with scap squeeze GTB loop 2x1'        Therapeutic Treatments and Modalities:     1. Manual Therapy (CPT 68812), GIII GHJ mobs, PROM OH short of pain    Time-based treatments/modalities:  Physical Therapy Timed Treatment Charges  Manual therapy minutes (CPT 78034): 15 minutes  Therapeutic exercise minutes (CPT 98269): 15 minutes  Assessment, Response and Plan:   Impairments: impaired functional mobility and lacks appropriate home exercise program    Assessment details:  PT finds s/sx consistent with RTC/subacromial pain syndrome with muscle power deficits. This is evident with painful AROM decreased with passive support, painful isometric muscle testing in neutral positions, and subjective description/location of symptoms. With Hx of trauma PT will refer to MD if no changes over course of first 4-6 weeks with muscular etiology likely.    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, use arm for all ADLs with mild pain at most  Short term goal time span:  2-4 weeks      Long Term Goals:    -pt meets MCID for QuickDASH  -pt has AROM 180 deg with no pain for all IADLs, coffee reaching for cup, etc  -pt painfree ADLs and HBB motion for usage of this side  -pt uses this arm for all work tasks with mild pain at most that calms to baseline within 30 min of cessation of activity  Long term goal time span:  6-8 weeks    Plan:   Therapy options:  Physical therapy treatment to continue  Planned therapy interventions:  E Stim Attended (CPT 68444), E Stim Unattended (CPT 54402), Manual Therapy (CPT 30668), Neuromuscular Re-education (CPT 47642), Mechanical Traction (CPT 03123), Therapeutic Activities (CPT 63475), Therapeutic Exercise (CPT 76697) and Hot or Cold Pack Therapy (CPT 10661)  Frequency:  2x week  Duration in weeks:  8  Duration in visits:  16  Discussed with:  Patient      Functional Assessment Used  Quickdash General Total Score: 31.82     Referring provider co-signature:  I have reviewed this plan of care and my co-signature certifies the need for services.    Certification Period: 04/05/2022 to  06/07/22    Physician Signature: ________________________________ Date: ______________

## 2022-04-07 ENCOUNTER — PHYSICAL THERAPY (OUTPATIENT)
Dept: PHYSICAL THERAPY | Facility: REHABILITATION | Age: 77
End: 2022-04-07
Attending: STUDENT IN AN ORGANIZED HEALTH CARE EDUCATION/TRAINING PROGRAM
Payer: MEDICARE

## 2022-04-07 DIAGNOSIS — Z00.6 RESEARCH STUDY PATIENT: ICD-10-CM

## 2022-04-07 DIAGNOSIS — M25.512 ACUTE PAIN OF LEFT SHOULDER: ICD-10-CM

## 2022-04-07 PROCEDURE — 97140 MANUAL THERAPY 1/> REGIONS: CPT

## 2022-04-07 PROCEDURE — 97110 THERAPEUTIC EXERCISES: CPT

## 2022-04-07 NOTE — OP THERAPY DAILY TREATMENT
Outpatient Physical Therapy  DAILY TREATMENT     [facility] [place]  [address], [address]  [place] [address]  Phone:  [phone]    Date: 04/07/2022    Patient: Katy Perez  YOB: 1945  MRN: [id number]     Time Calculation    Start time: 0345  Stop time: 0430 Time Calculation (min): 45 minutes     Chief Complaint: No chief complaint on file.    Visit #: 2    SUBJECTIVE:  Shoulder feels similar; thinks she is moving it higher with her wall walk and is finding she doesn't have to support the UE now doing this motion. She noted some pain driving to the clinic and seemed to go up to her L sided neck region too.    Sxs: lateral shoulder pain with ache that feels deep, radiation to posterolateral upper arm. Denies other radiation or neck sxs. Crunching with no pain reported on this shoulder  Aggs: OH reaching goes away once she relaxes, reaching HBB. Carrying bags of feed/tossing hay, reaching to put the coffee cup in microwave Does calm down once she can rest and with work tasks takes an hour usually to calm. Drives >45min    OBJECTIVE:  Current objective measures:   Shoulder AROM L/R (deferred ROBINA/FIR)  Flexion: 170* (painful arc), 180  Abduction: 170** (painful arc), 180  ER: 70*, 90    Shoulder ER: 4+*/5, 5/5  Shoulder IR: 4+*/5, 5/5  Shoulder ABD: 4*/5, 5/5    PROM L/R  Flexion: 160*, 180 (slight guarded end feel)  Abduction: 150*, 180  (slight guarding)  90/90 ER: 85*, 90          Therapeutic Exercises (CPT 44861):     1. UPOC 6/5/22      Therapeutic Exercise Summary: Wall walks x10  Isometric ABD with scap squeeze GTB loop 2x1'  Rows BUE bigOTB 2x20, LEXI row PTB at door x20  Bent over rows 20# x10  S/L ER 3# 2x10        Therapeutic Treatments and Modalities:     1. Manual Therapy (CPT 94763), GIII shoulder MWM ER, inf glide in ABD    Time-based treatments/modalities:    Physical Therapy Timed Treatment Charges  Manual therapy minutes (CPT 93431): 10  minutes  Therapeutic exercise minutes (CPT 14431): 35 minutes  ASSESSMENT:   Response to treatment: Patient with improved AROM but still painful arc. Tolerated today's session well without sig increase in shoulder pain and no pain with heavier rows. Progress based off response next session.    PLAN/RECOMMENDATIONS:   Plan for treatment: therapy treatment to continue next visit.  Planned interventions for next visit: continue with current treatment.

## 2022-04-08 ENCOUNTER — HOSPITAL ENCOUNTER (OUTPATIENT)
Facility: MEDICAL CENTER | Age: 77
End: 2022-04-08
Attending: PATHOLOGY
Payer: COMMERCIAL

## 2022-04-08 DIAGNOSIS — Z00.6 RESEARCH STUDY PATIENT: ICD-10-CM

## 2022-04-13 LAB
ELF SCORE: 9.5
RELATIVE RISK: NORMAL
RISK GROUP: NORMAL
RISK: 3.3 %

## 2022-04-13 NOTE — OP THERAPY DAILY TREATMENT
"  Outpatient Physical Therapy  DAILY TREATMENT     [facility] [place]  [address] [facility], [address]  [place] [address]  Phone:  [phone]    Date: 04/14/2022    Patient: Katy Perez  YOB: 1945  MRN: [id number]     Time Calculation    Start time: 0300  Stop time: 0345 Time Calculation (min): 45 minutes     Chief Complaint: No chief complaint on file.    Visit #: 3    SUBJECTIVE:  Patient indicates that her shoulder has been feeling well; she has some deficits still reported with reaching and pushing hay bags (needs help of other side). Does note some neck discomfort on the L side as well up towards the base of the skull.     Sxs: lateral shoulder pain with ache that feels deep, radiation to posterolateral upper arm. Denies other radiation or neck sxs. Crunching with no pain reported on this shoulder  Aggs: OH reaching goes away once she relaxes, reaching HBB. Carrying bags of feed/tossing hay, reaching to put the coffee cup in microwave Does calm down once she can rest and with work tasks takes an hour usually to calm. Drives >45min    OBJECTIVE:  Current objective measures:   C/S AROM  R* (L lateral shoulder area)  +CRFT tightness in L sided neck    Shoulder AROM L/R (deferred ROBINA/FIR)  Flexion: 180* (very minor painful at top), 180  Abduction: 180** (painful at top), 180  ER: 90*, 90 (very minor pain)    Shoulder ER: 4+*/5, 5/5  Shoulder IR: 4+*/5, 5/5  Shoulder ABD: 4*/5, 5/5    PROM L/R  Flexion: 170*, 180 (slight guarded end feel)  Abduction: 170*, 180  (slight guarding)  90/90 ER: 85*, 90          Therapeutic Exercises (CPT 57195):     1. UPOC 6/5/22      Therapeutic Exercise Summary: Wall walks x10  Isometric ABD with scap squeeze GTB loop 3x30\"  ABD with scap to 45 deg lateral raise BTB 2x10      Bent over rows 20# x10  S/L ER 3# 2x20        Therapeutic Treatments and Modalities:     1. Manual Therapy (CPT 28360), GIII shoulder MWM ER, inf glide in ABD, GIII UP-As " C2/3    Time-based treatments/modalities:    Physical Therapy Timed Treatment Charges  Manual therapy minutes (CPT 31259): 10 minutes  Therapeutic exercise minutes (CPT 27604): 35 minutes  ASSESSMENT:   Pt with improved AROM response, still pain with force production and end range ROM. Cont to progress as able and adjust based off response to PT. Slight pain in clinic today but tolerable by end of session. She was given SNAGs with benefit of P-As to upper C/S with neck tightness with rotation.  PLAN/RECOMMENDATIONS:   Plan for treatment: therapy treatment to continue next visit.  Planned interventions for next visit: continue with current treatment.

## 2022-04-14 ENCOUNTER — PHYSICAL THERAPY (OUTPATIENT)
Dept: PHYSICAL THERAPY | Facility: REHABILITATION | Age: 77
End: 2022-04-14
Attending: STUDENT IN AN ORGANIZED HEALTH CARE EDUCATION/TRAINING PROGRAM
Payer: MEDICARE

## 2022-04-14 DIAGNOSIS — M25.512 ACUTE PAIN OF LEFT SHOULDER: ICD-10-CM

## 2022-04-14 PROCEDURE — 97140 MANUAL THERAPY 1/> REGIONS: CPT

## 2022-04-14 PROCEDURE — 97110 THERAPEUTIC EXERCISES: CPT

## 2022-04-18 PROBLEM — I77.9 DISORDER OF ARTERIES AND ARTERIOLES (HCC): Status: ACTIVE | Noted: 2022-04-18

## 2022-04-18 PROBLEM — E66.3 OVERWEIGHT WITH BODY MASS INDEX (BMI) OF 29 TO 29.9 IN ADULT: Status: ACTIVE | Noted: 2022-04-18

## 2022-04-19 PROCEDURE — RXMED WILLOW AMBULATORY MEDICATION CHARGE: Performed by: NURSE PRACTITIONER

## 2022-04-20 ENCOUNTER — PHARMACY VISIT (OUTPATIENT)
Dept: PHARMACY | Facility: MEDICAL CENTER | Age: 77
End: 2022-04-20
Payer: MEDICARE

## 2022-04-26 ENCOUNTER — PHYSICAL THERAPY (OUTPATIENT)
Dept: PHYSICAL THERAPY | Facility: REHABILITATION | Age: 77
End: 2022-04-26
Attending: STUDENT IN AN ORGANIZED HEALTH CARE EDUCATION/TRAINING PROGRAM
Payer: MEDICARE

## 2022-04-26 DIAGNOSIS — M25.512 ACUTE PAIN OF LEFT SHOULDER: ICD-10-CM

## 2022-04-26 PROCEDURE — 97110 THERAPEUTIC EXERCISES: CPT

## 2022-04-26 PROCEDURE — 97140 MANUAL THERAPY 1/> REGIONS: CPT

## 2022-04-26 NOTE — OP THERAPY DAILY TREATMENT
Outpatient Physical Therapy  DAILY TREATMENT     [facility] Out[facility] Therapy [place] [id number] [facility], [address]  [place] [address]  Phone:  [phone]    Date: 04/26/2022    Patient: Katy Perez  YOB: 1945  MRN: [id number]     Time Calculation    Start time: 1320  Stop time: 1406 Time Calculation (min): 46 minutes         Chief Complaint: Shoulder Injury (Left)    Visit #: 4    SUBJECTIVE:  Patient reports that she has been doing the SNAGs prior to bed and they seem to be really helping! She has noted decreased pain allowing for better sleep. Noted improving ROM but continues to have pinpoint pain on back of left shoulder at end range (pointed over infraspinatus muscle).     OBJECTIVE:  Current objective measures:   Shoulder AROM L/R   Flexion: 180* (painful arc), 180  Abduction: 180** (painful arc), 180  BTB: T8*, T6  BTH:T4, T4        Therapeutic Exercises (CPT 73108):     1. UPOC 6/5/22    2. Shoulder IR stretch with towel behind back, 10 x 10 second holds , Added to HEP     3. Shoulder IR with elbow at side, x 20 , No difficulty, orange TB    4. Shoulder IR at 90/90, 2 x 15, Orange TB, added to HEP     5. Bilateral shoulder ER , 2 x 15, Green TB, added to HEP and discontinued isometric activation.       Therapeutic Exercise Summary:         Therapeutic Treatments and Modalities:     1. Manual Therapy (CPT 48179), STM to L infraspinatus, UT in supine , Inflammation surrounding L infraspinatus noticeable.     Time-based treatments/modalities:    Physical Therapy Timed Treatment Charges  Manual therapy minutes (CPT 84950): 15 minutes  Therapeutic exercise minutes (CPT 67246): 31 minutes    ASSESSMENT:   Response to treatment: Patient demonstrated TTP and inflammation surrounding infraspinatus. Responded well to manual techniques and noted decreased end range pain following. Updated HEP to include stretching and stabilization of this muscle.     PLAN/RECOMMENDATIONS:   Plan for  treatment: therapy treatment to continue next visit.  Planned interventions for next visit: continue with current treatment.

## 2022-05-02 NOTE — OP THERAPY DAILY TREATMENT
Outpatient Physical Therapy  DAILY TREATMENT     [facility] Therapy [address] [facility], [address]  [place] [address]  Phone:  [phone]    Date: 05/03/2022    Patient: Katy Perez  YOB: 1945  MRN: [id number]     Time Calculation    Start time: 1330  Stop time: 1415 Time Calculation (min): 45 minutes     Chief Complaint: No chief complaint on file.    Visit #: 5    SUBJECTIVE:  Patient indicates she feels she is on the right track.     Sxs: EOD and after heavy exercises pain is more noticeable. lateral shoulder pain with ache that feels deep, radiation to posterolateral upper arm. Denies other radiation or neck sxs. Crunching with no pain reported on this shoulder.    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 Much better with reaching for a coffee cup/etc.    OBJECTIVE:  Current objective measures:   C/S AROM WFL no sxs today    Shoulder AROM L/R (deferred ROBINA/FIR)  Flexion: 180 ea no sxs  Abduction: 180** (painful at 160), 180  ER: 90, 90     Shoulder ER: 5*/5, 5/5  Shoulder IR: 5/5, 5/5  Shoulder ABD: 4*/5, 5/5    PROM L/R  Flexion: 180, 180   Abduction: 175*, 180  (slight guarding)  90/90 ER: 90, 90          Therapeutic Exercises (CPT 28786):     1. UPOC 6/5/22      Therapeutic Exercise Summary: Isometric ABD with scap squeeze GTB loop 2x20  ABD with scap to 45 deg lateral raise BTB 2x20  S/L ABD 3# 2x20    Bent over rows 20# x10  S/L ER 3# 2x20 NT  OH press landmine 15# x10ea (fatigued on L slight discomfort)        Therapeutic Treatments and Modalities:     1. Manual Therapy (CPT 46036), GIII ABD mobs, STW infraspinatus    Time-based treatments/modalities:  Physical Therapy Timed Treatment Charges  Manual therapy minutes (CPT 11024): 10 minutes  Therapeutic exercise minutes (CPT 48804): 35 minutes  ASSESSMENT:   Pt with cont improvements in AROM; still painful arc ABD at top; PROM end range ABD/ABD MMT the most. Progressed here as well as functional exercises  today. Will assess response next visit.    PLAN/RECOMMENDATIONS:   Plan for treatment: therapy treatment to continue next visit.  Planned interventions for next visit: continue with current treatment.

## 2022-05-03 ENCOUNTER — PHYSICAL THERAPY (OUTPATIENT)
Dept: PHYSICAL THERAPY | Facility: REHABILITATION | Age: 77
End: 2022-05-03
Attending: STUDENT IN AN ORGANIZED HEALTH CARE EDUCATION/TRAINING PROGRAM
Payer: MEDICARE

## 2022-05-03 DIAGNOSIS — M25.512 ACUTE PAIN OF LEFT SHOULDER: ICD-10-CM

## 2022-05-03 PROCEDURE — 97110 THERAPEUTIC EXERCISES: CPT

## 2022-05-03 PROCEDURE — 97140 MANUAL THERAPY 1/> REGIONS: CPT

## 2022-05-10 ENCOUNTER — APPOINTMENT (OUTPATIENT)
Dept: PHYSICAL THERAPY | Facility: REHABILITATION | Age: 77
End: 2022-05-10
Attending: STUDENT IN AN ORGANIZED HEALTH CARE EDUCATION/TRAINING PROGRAM
Payer: MEDICARE

## 2022-05-12 ENCOUNTER — APPOINTMENT (OUTPATIENT)
Dept: PHYSICAL THERAPY | Facility: REHABILITATION | Age: 77
End: 2022-05-12
Attending: STUDENT IN AN ORGANIZED HEALTH CARE EDUCATION/TRAINING PROGRAM
Payer: MEDICARE

## 2022-05-16 NOTE — OP THERAPY DAILY TREATMENT
Outpatient Physical Therapy  DAILY TREATMENT     [facility] Outpatient Physical Therapy [place] [id number] [facility], [address]  [place] [address]  Phone:  [phone]    Date: 05/17/2022    Patient: Katy Perez  YOB: 1945  MRN: [id number]     Time Calculation    Start time: 1330  Stop time: 1410 Time Calculation (min): 40 minutes   Chief Complaint: No chief complaint on file.  Visit #: 6  SUBJECTIVE:   Patient indicates she feels is doing well overall. Thinks may be close to DC with no pain.    Sxs: EOD and after heavy exercises pain is more noticeable. lateral shoulder pain with ache that feels deep, radiation to posterolateral upper arm. Denies other radiation or neck sxs. Crunching with no pain reported on this shoulder.    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 Reaching HBB is the only thing.    OBJECTIVE:   Current objective measures:   C/S AROM WFL no sxs today    Shoulder AROM L/R (deferred ROBINA/FIR)  Flexion: 180 ea no sxs  Abduction: 180** (painful at top), 180  ER: 90, 90     Shoulder ER: 5/5, 5/5  Shoulder IR: 5/5, 5/5  Shoulder ABD: 4+*/5, 5/5    PROM L/R  Flexion: 180, 180   Abduction: 175*, 180  (slight guarding)  90/90 ER: 90, 90          Therapeutic Exercises (CPT 89801):     1. UPOC 6/5/22      Therapeutic Exercise Summary: Isometric ABD with scap squeeze GTB loop 2x20  Bent over rows 15# x30ea  S/L ABD 3# x20   OH press landmine 15# x10ea (fatigued on L slight discomfort) NT  scaption w/ minor pain at most 3# x15  Prone T 3# x20        Therapeutic Treatments and Modalities:     1. Manual Therapy (CPT 35751), GIII ABD mobs/inf glides    Time-based treatments/modalities:  Physical Therapy Timed Treatment Charges  Manual therapy minutes (CPT 32182): 10 minutes  Therapeutic exercise minutes (CPT 12742): 30 minutes  ASSESSMENT:  Pt with cont improvements in AROM/MMT response. Now has pain end range HBB/ABD at this point. Given some stretches to cont to address here as well as  update to strength for HEP. She is close to DC functionally and may opt for this route. We will keep her case open should she have a set back/to check the above measures.    PLAN/RECOMMENDATIONS:   Plan for treatment: therapy treatment to continue next visit.  Planned interventions for next visit: continue with current treatment.

## 2022-05-17 ENCOUNTER — PHYSICAL THERAPY (OUTPATIENT)
Dept: PHYSICAL THERAPY | Facility: REHABILITATION | Age: 77
End: 2022-05-17
Attending: STUDENT IN AN ORGANIZED HEALTH CARE EDUCATION/TRAINING PROGRAM
Payer: MEDICARE

## 2022-05-17 DIAGNOSIS — M25.512 ACUTE PAIN OF LEFT SHOULDER: ICD-10-CM

## 2022-05-17 PROCEDURE — 97110 THERAPEUTIC EXERCISES: CPT

## 2022-05-17 PROCEDURE — 97140 MANUAL THERAPY 1/> REGIONS: CPT

## 2022-05-19 ENCOUNTER — APPOINTMENT (OUTPATIENT)
Dept: PHYSICAL THERAPY | Facility: REHABILITATION | Age: 77
End: 2022-05-19
Attending: STUDENT IN AN ORGANIZED HEALTH CARE EDUCATION/TRAINING PROGRAM
Payer: MEDICARE

## 2022-08-19 ENCOUNTER — TELEPHONE (OUTPATIENT)
Dept: MEDICAL GROUP | Facility: PHYSICIAN GROUP | Age: 77
End: 2022-08-19
Payer: MEDICARE

## 2022-08-19 NOTE — TELEPHONE ENCOUNTER
ESTABLISHED PATIENT PRE-VISIT PLANNING     Patient was contacted to complete PVP.     Note: Patient will not be contacted if there is no indication to call.     1.  Reviewed notes from the last few office visits within the medical group: Yes    2.  If any orders were placed at last visit or intended to be done for this visit (i.e. 6 mos follow-up), do we have Results/Consult Notes?           Labs - Labs ordered, NOT completed. Patient advised to complete prior to next appointment.  Note: If patient appointment is for lab review and patient did not complete labs, check with provider if OK to reschedule patient until labs completed.         Imaging - Imaging was not ordered at last office visit.         Referrals - No referrals were ordered at last office visit.    3. Is this appointment scheduled as a Hospital Follow-Up? No    4.  Immunizations were updated in Epic using Reconcile Outside Information activity? Yes    5.  Patient is due for the following Health Maintenance Topics:   Health Maintenance Due   Topic Date Due    IMM HEP B VACCINE (1 of 3 - 3-dose series) Never done    COLORECTAL CANCER SCREENING  06/11/2022       6.  AHA (Pulse[employer]) form printed for Provider? Yes

## 2022-08-23 ENCOUNTER — HOSPITAL ENCOUNTER (OUTPATIENT)
Dept: LAB | Facility: MEDICAL CENTER | Age: 77
End: 2022-08-23
Attending: STUDENT IN AN ORGANIZED HEALTH CARE EDUCATION/TRAINING PROGRAM
Payer: MEDICARE

## 2022-08-23 DIAGNOSIS — N18.31 STAGE 3A CHRONIC KIDNEY DISEASE: ICD-10-CM

## 2022-08-23 DIAGNOSIS — Z86.39 HISTORY OF VITAMIN D DEFICIENCY: ICD-10-CM

## 2022-08-23 DIAGNOSIS — M85.852 OSTEOPENIA OF LEFT HIP: ICD-10-CM

## 2022-08-23 LAB
25(OH)D3 SERPL-MCNC: 49 NG/ML (ref 30–100)
ANION GAP SERPL CALC-SCNC: 12 MMOL/L (ref 7–16)
BUN SERPL-MCNC: 20 MG/DL (ref 8–22)
CALCIUM SERPL-MCNC: 9.2 MG/DL (ref 8.5–10.5)
CHLORIDE SERPL-SCNC: 104 MMOL/L (ref 96–112)
CO2 SERPL-SCNC: 23 MMOL/L (ref 20–33)
CREAT SERPL-MCNC: 1.08 MG/DL (ref 0.5–1.4)
GFR SERPLBLD CREATININE-BSD FMLA CKD-EPI: 53 ML/MIN/1.73 M 2
GLUCOSE SERPL-MCNC: 91 MG/DL (ref 65–99)
POTASSIUM SERPL-SCNC: 4.7 MMOL/L (ref 3.6–5.5)
SODIUM SERPL-SCNC: 139 MMOL/L (ref 135–145)

## 2022-08-23 PROCEDURE — 80048 BASIC METABOLIC PNL TOTAL CA: CPT

## 2022-08-23 PROCEDURE — 82306 VITAMIN D 25 HYDROXY: CPT

## 2022-08-23 PROCEDURE — 36415 COLL VENOUS BLD VENIPUNCTURE: CPT

## 2022-08-25 ENCOUNTER — OFFICE VISIT (OUTPATIENT)
Dept: MEDICAL GROUP | Facility: PHYSICIAN GROUP | Age: 77
End: 2022-08-25
Payer: MEDICARE

## 2022-08-25 VITALS
SYSTOLIC BLOOD PRESSURE: 126 MMHG | DIASTOLIC BLOOD PRESSURE: 68 MMHG | TEMPERATURE: 97.4 F | HEART RATE: 74 BPM | OXYGEN SATURATION: 97 %

## 2022-08-25 DIAGNOSIS — I70.0 AORTIC ATHEROSCLEROSIS (HCC): ICD-10-CM

## 2022-08-25 DIAGNOSIS — Z12.11 ENCOUNTER FOR SCREENING FOR MALIGNANT NEOPLASM OF COLON: ICD-10-CM

## 2022-08-25 DIAGNOSIS — N18.31 STAGE 3A CHRONIC KIDNEY DISEASE: ICD-10-CM

## 2022-08-25 DIAGNOSIS — I10 ESSENTIAL HYPERTENSION: ICD-10-CM

## 2022-08-25 DIAGNOSIS — R05.9 COUGH: ICD-10-CM

## 2022-08-25 PROCEDURE — 99214 OFFICE O/P EST MOD 30 MIN: CPT | Performed by: STUDENT IN AN ORGANIZED HEALTH CARE EDUCATION/TRAINING PROGRAM

## 2022-08-25 RX ORDER — LOSARTAN POTASSIUM 50 MG/1
50 TABLET ORAL DAILY
Qty: 100 TABLET | Refills: 3 | Status: SHIPPED | OUTPATIENT
Start: 2022-08-25 | End: 2023-11-05 | Stop reason: SDUPTHER

## 2022-08-25 NOTE — PROGRESS NOTES
Subjective:     Chief Complaint   Patient presents with    Follow-Up     6 mo follow up     HPI:   Katy presents today for follow up.    Has been checking her blood pressure at home, at goal we will.  States that in the past 4 days she has developed a cough that started off very mild and then a few days ago it persisted throughout the day without chest pain or shortness of breath.  No fever or chills.  Essentially pretty much resolved at this point, no sick contact. Does work with hay so wondered about mold, etc. No other symptoms aside from minimal PND.     No problem-specific Assessment & Plan notes found for this encounter.    Current Outpatient Medications Ordered in Epic   Medication Sig Dispense Refill    VITAMIN D PO Take  by mouth.      losartan (COZAAR) 50 MG Tab Take 1 tablet by mouth every day. 100 Tablet 3    Calcium Carbonate Antacid (TUMS ULTRA 1000) 1000 MG Chew Tab Chew 2 Tablets every morning. 90 Tablet 3     No current Epic-ordered facility-administered medications on file.     ROS:  Gen: no fevers/chills, no changes in weight  Eyes: no changes in vision  ENT: no sore throat  Pulm: no sob  CV: no chest pain, no palpitations  GI: no nausea/vomiting, no diarrhea  MSk: no myalgias  Skin: no rash  Neuro: no headaches  Heme/Lymph: no easy bruising    Objective:     Exam:  /68 (BP Location: Left arm, Patient Position: Sitting, BP Cuff Size: Adult)   Pulse 74   Temp 36.3 °C (97.4 °F) (Temporal)   SpO2 97%  There is no height or weight on file to calculate BMI.    Physical Exam:  Constitutional: Well-developed and well-nourished. No acute distress.   Skin: Skin is warm and dry. No rash noted.  Head: Atraumatic without lesions.  Eyes: Conjunctivae and extraocular motions are normal. Pupils are equal, round. No scleral icterus.   Mouth/Throat: Wearing mask.  Neck: Supple, trachea midline.   Cardiovascular: Regular rate and rhythm, S1 and S2 without murmur, rubs, or gallops.  Lungs: Normal  inspiratory effort, CTA bilaterally, no wheezes/rhonchi/rales  Extremities: No cyanosis, clubbing, erythema, nor edema. +2 dorsalis pedis pulses bilat.  Neurological: Alert and oriented x 3. No gross/focal deficits.  Psychiatric:  Behavior, mood, and affect are appropriate.    Labs: Reviewed from 8/23/2022    Assessment & Plan:     77 y.o. female with the following -     1. Stage 3a chronic kidney disease (HCC)  Chronic, stable. BP controlled, continue ARB. Repeat labs in 6m, avoid nephrotoxic medication and advised adequate hydration.   - CBC WITH DIFFERENTIAL; Future  - MICROALBUMIN CREAT RATIO URINE; Future  - PTH INTACT (PTH ONLY); Future  - Comp Metabolic Panel; Future    2. Essential hypertension  Chronic, well controlled. No changes to current treatment.  - losartan (COZAAR) 50 MG Tab; Take 1 tablet by mouth every day.  Dispense: 100 Tablet; Refill: 3    3. Aortic atherosclerosis (HCC)  Incidentally noted prior, lipids good last year and has hx of statin intolerance. Will trend.  - Lipid Profile; Future    4. Encounter for screening for malignant neoplasm of colon  - Referral to GI for Colonoscopy    5. Cough  Acute x 4 days, improved. Exam normal. Return precautions given, declined COVID PCR.    Return in about 6 months (around 2/25/2023), or if symptoms worsen or fail to improve, for Annual.    Please note that this dictation was created using voice recognition software. I have made every reasonable attempt to correct obvious errors, but I expect that there are errors of grammar and possibly content that I did not discover before finalizing the note.

## 2022-08-26 PROCEDURE — RXMED WILLOW AMBULATORY MEDICATION CHARGE: Performed by: STUDENT IN AN ORGANIZED HEALTH CARE EDUCATION/TRAINING PROGRAM

## 2022-08-30 ENCOUNTER — PHARMACY VISIT (OUTPATIENT)
Dept: PHARMACY | Facility: MEDICAL CENTER | Age: 77
End: 2022-08-30
Payer: MEDICARE

## 2022-11-28 PROCEDURE — RXMED WILLOW AMBULATORY MEDICATION CHARGE: Performed by: STUDENT IN AN ORGANIZED HEALTH CARE EDUCATION/TRAINING PROGRAM

## 2023-01-04 ENCOUNTER — PHARMACY VISIT (OUTPATIENT)
Dept: PHARMACY | Facility: MEDICAL CENTER | Age: 78
End: 2023-01-04
Payer: MEDICARE

## 2023-01-31 ENCOUNTER — DOCUMENTATION (OUTPATIENT)
Dept: HEALTH INFORMATION MANAGEMENT | Facility: OTHER | Age: 78
End: 2023-01-31
Payer: MEDICARE

## 2023-02-24 ENCOUNTER — HOSPITAL ENCOUNTER (OUTPATIENT)
Dept: LAB | Facility: MEDICAL CENTER | Age: 78
End: 2023-02-24
Attending: STUDENT IN AN ORGANIZED HEALTH CARE EDUCATION/TRAINING PROGRAM
Payer: MEDICARE

## 2023-02-24 DIAGNOSIS — I70.0 AORTIC ATHEROSCLEROSIS (HCC): ICD-10-CM

## 2023-02-24 DIAGNOSIS — N18.31 STAGE 3A CHRONIC KIDNEY DISEASE: ICD-10-CM

## 2023-02-24 LAB
ALBUMIN SERPL BCP-MCNC: 4.3 G/DL (ref 3.2–4.9)
ALBUMIN/GLOB SERPL: 1.9 G/DL
ALP SERPL-CCNC: 96 U/L (ref 30–99)
ALT SERPL-CCNC: 17 U/L (ref 2–50)
ANION GAP SERPL CALC-SCNC: 11 MMOL/L (ref 7–16)
AST SERPL-CCNC: 18 U/L (ref 12–45)
BASOPHILS # BLD AUTO: 1 % (ref 0–1.8)
BASOPHILS # BLD: 0.07 K/UL (ref 0–0.12)
BILIRUB SERPL-MCNC: 0.6 MG/DL (ref 0.1–1.5)
BUN SERPL-MCNC: 15 MG/DL (ref 8–22)
CALCIUM ALBUM COR SERPL-MCNC: 8.9 MG/DL (ref 8.5–10.5)
CALCIUM SERPL-MCNC: 9.1 MG/DL (ref 8.5–10.5)
CHLORIDE SERPL-SCNC: 107 MMOL/L (ref 96–112)
CHOLEST SERPL-MCNC: 177 MG/DL (ref 100–199)
CO2 SERPL-SCNC: 24 MMOL/L (ref 20–33)
CREAT SERPL-MCNC: 0.94 MG/DL (ref 0.5–1.4)
CREAT UR-MCNC: 175.64 MG/DL
EOSINOPHIL # BLD AUTO: 0.11 K/UL (ref 0–0.51)
EOSINOPHIL NFR BLD: 1.5 % (ref 0–6.9)
ERYTHROCYTE [DISTWIDTH] IN BLOOD BY AUTOMATED COUNT: 45.4 FL (ref 35.9–50)
FASTING STATUS PATIENT QL REPORTED: NORMAL
GFR SERPLBLD CREATININE-BSD FMLA CKD-EPI: 63 ML/MIN/1.73 M 2
GLOBULIN SER CALC-MCNC: 2.3 G/DL (ref 1.9–3.5)
GLUCOSE SERPL-MCNC: 89 MG/DL (ref 65–99)
HCT VFR BLD AUTO: 42.9 % (ref 37–47)
HDLC SERPL-MCNC: 49 MG/DL
HGB BLD-MCNC: 14 G/DL (ref 12–16)
IMM GRANULOCYTES # BLD AUTO: 0.03 K/UL (ref 0–0.11)
IMM GRANULOCYTES NFR BLD AUTO: 0.4 % (ref 0–0.9)
LDLC SERPL CALC-MCNC: 108 MG/DL
LYMPHOCYTES # BLD AUTO: 1.56 K/UL (ref 1–4.8)
LYMPHOCYTES NFR BLD: 21.7 % (ref 22–41)
MCH RBC QN AUTO: 30.7 PG (ref 27–33)
MCHC RBC AUTO-ENTMCNC: 32.6 G/DL (ref 33.6–35)
MCV RBC AUTO: 94.1 FL (ref 81.4–97.8)
MICROALBUMIN UR-MCNC: <1.2 MG/DL
MICROALBUMIN/CREAT UR: NORMAL MG/G (ref 0–30)
MONOCYTES # BLD AUTO: 0.52 K/UL (ref 0–0.85)
MONOCYTES NFR BLD AUTO: 7.2 % (ref 0–13.4)
NEUTROPHILS # BLD AUTO: 4.89 K/UL (ref 2–7.15)
NEUTROPHILS NFR BLD: 68.2 % (ref 44–72)
NRBC # BLD AUTO: 0 K/UL
NRBC BLD-RTO: 0 /100 WBC
PLATELET # BLD AUTO: 291 K/UL (ref 164–446)
PMV BLD AUTO: 10.9 FL (ref 9–12.9)
POTASSIUM SERPL-SCNC: 4 MMOL/L (ref 3.6–5.5)
PROT SERPL-MCNC: 6.6 G/DL (ref 6–8.2)
PTH-INTACT SERPL-MCNC: 39.2 PG/ML (ref 14–72)
RBC # BLD AUTO: 4.56 M/UL (ref 4.2–5.4)
SODIUM SERPL-SCNC: 142 MMOL/L (ref 135–145)
TRIGL SERPL-MCNC: 101 MG/DL (ref 0–149)
WBC # BLD AUTO: 7.2 K/UL (ref 4.8–10.8)

## 2023-02-24 PROCEDURE — 82570 ASSAY OF URINE CREATININE: CPT

## 2023-02-24 PROCEDURE — 36415 COLL VENOUS BLD VENIPUNCTURE: CPT

## 2023-02-24 PROCEDURE — 83970 ASSAY OF PARATHORMONE: CPT

## 2023-02-24 PROCEDURE — 80061 LIPID PANEL: CPT

## 2023-02-24 PROCEDURE — 82043 UR ALBUMIN QUANTITATIVE: CPT

## 2023-02-24 PROCEDURE — 85025 COMPLETE CBC W/AUTO DIFF WBC: CPT

## 2023-02-24 PROCEDURE — 80053 COMPREHEN METABOLIC PANEL: CPT

## 2023-03-02 ENCOUNTER — OFFICE VISIT (OUTPATIENT)
Dept: MEDICAL GROUP | Facility: PHYSICIAN GROUP | Age: 78
End: 2023-03-02
Payer: MEDICARE

## 2023-03-02 VITALS
RESPIRATION RATE: 20 BRPM | SYSTOLIC BLOOD PRESSURE: 134 MMHG | DIASTOLIC BLOOD PRESSURE: 64 MMHG | HEART RATE: 67 BPM | HEIGHT: 66 IN | WEIGHT: 184 LBS | TEMPERATURE: 99 F | BODY MASS INDEX: 29.57 KG/M2 | OXYGEN SATURATION: 97 %

## 2023-03-02 DIAGNOSIS — N18.2 STAGE 2 CHRONIC KIDNEY DISEASE: ICD-10-CM

## 2023-03-02 DIAGNOSIS — Z98.82 STATUS POST SILICONE BREAST IMPLANT: ICD-10-CM

## 2023-03-02 DIAGNOSIS — M85.852 OSTEOPENIA OF LEFT HIP: ICD-10-CM

## 2023-03-02 DIAGNOSIS — E89.2 S/P REMOVAL OF PARATHYROID GLAND: ICD-10-CM

## 2023-03-02 DIAGNOSIS — I70.0 AORTIC ATHEROSCLEROSIS (HCC): ICD-10-CM

## 2023-03-02 DIAGNOSIS — E78.00 ELEVATED LDL CHOLESTEROL LEVEL: ICD-10-CM

## 2023-03-02 PROCEDURE — 99214 OFFICE O/P EST MOD 30 MIN: CPT | Performed by: STUDENT IN AN ORGANIZED HEALTH CARE EDUCATION/TRAINING PROGRAM

## 2023-03-02 ASSESSMENT — PATIENT HEALTH QUESTIONNAIRE - PHQ9: CLINICAL INTERPRETATION OF PHQ2 SCORE: 0

## 2023-03-02 ASSESSMENT — FIBROSIS 4 INDEX: FIB4 SCORE: 1.13

## 2023-03-02 NOTE — PROGRESS NOTES
"Subjective:     Chief Complaint   Patient presents with    Follow-Up     HPI:   Katy presents today for follow up.    Would like to review labs. No concerns, but would like to know if implants should be replaced.    Current Outpatient Medications Ordered in Epic   Medication Sig Dispense Refill    VITAMIN D PO Take  by mouth.      losartan (COZAAR) 50 MG Tab Take 1 tablet by mouth every day. 100 Tablet 3    Calcium Carbonate Antacid (TUMS ULTRA 1000) 1000 MG Chew Tab Chew 2 Tablets every morning. 90 Tablet 3     No current Epic-ordered facility-administered medications on file.     ROS:  Gen: no fevers/chills, no changes in weight  Eyes: no changes in vision  ENT: no sore throat  Pulm: no sob, no cough  CV: no chest pain, no palpitations  GI: no nausea/vomiting, no diarrhea  Skin: no rash  Neuro: no headaches  Heme/Lymph: no easy bruising    Objective:     Exam:  /64 (BP Location: Left arm, Patient Position: Sitting, BP Cuff Size: Adult)   Pulse 67   Temp 37.2 °C (99 °F) (Temporal)   Resp 20   Ht 1.676 m (5' 6\")   Wt 83.5 kg (184 lb)   SpO2 97%   BMI 29.70 kg/m²  Body mass index is 29.7 kg/m².    Physical Exam:  Constitutional: Well-developed and well-nourished. No acute distress.   Skin: Skin is warm and dry. No rash noted.  Head: Atraumatic without lesions.  Eyes: Conjunctivae and extraocular motions are normal. Pupils are equal, round. No scleral icterus.   Mouth/Throat: Wearing mask.  Neck: Supple, trachea midline.   Cardiovascular: Regular rate and rhythm, S1 and S2 without murmur, rubs, or gallops.  Lungs: Normal inspiratory effort, CTA bilaterally, no wheezes/rhonchi/rales  Extremities: No cyanosis, clubbing, erythema, nor edema.  Neurological: Alert and oriented x 3. No gross/focal deficits.  Psychiatric:  Behavior, mood, and affect are appropriate.    Labs: Reviewed from 2/24/2023    Assessment & Plan:     77 y.o. female with the following -     1. Stage 2 chronic kidney disease  CKD stage 3 " resolved. Will monitor Cr Q6m for now. States BP well controlled at home (<130), discussed ideal ranges and return precautions. Continue losartan 50mg daily.  - Comp Metabolic Panel; Future  - VITAMIN D,25 HYDROXY (DEFICIENCY); Future  - CBC WITH DIFFERENTIAL; Future    2. S/P removal of parathyroid gland (HCC)  Stable. Trend below with next labs.  - Comp Metabolic Panel; Future  - VITAMIN D,25 HYDROXY (DEFICIENCY); Future  - PTH INTACT (PTH ONLY); Future    3. Osteopenia of left hip  Chronic, due to trend DEXA.  - DS-BONE DENSITY STUDY (DEXA); Future    4. Aortic atherosclerosis (HCC)  5. Elevated LDL cholesterol level  Minimal LDL elevation, patient prefers to avoid statin and work on diet/exercise. Trend in 6m.  - Comp Metabolic Panel; Future  - TSH WITH REFLEX TO FT4; Future  - Lipid Profile; Future    6. Status post silicone breast implant  Wonders if implants need to be replaced, no current breast complaints. Referred to plastic surgery to discuss.  - Referral to Plastic Surgery    Return in about 6 months (around 9/2/2023), or if symptoms worsen or fail to improve, for Annual.    Please note that this dictation was created using voice recognition software. I have made every reasonable attempt to correct obvious errors, but I expect that there are errors of grammar and possibly content that I did not discover before finalizing the note.

## 2023-03-08 PROBLEM — E55.9 VITAMIN D DEFICIENCY: Status: ACTIVE | Noted: 2023-03-08

## 2023-03-08 PROBLEM — I73.9 PAD (PERIPHERAL ARTERY DISEASE) (HCC): Status: ACTIVE | Noted: 2023-03-08

## 2023-03-08 PROBLEM — G62.9 NEUROPATHY: Status: ACTIVE | Noted: 2023-03-08

## 2023-03-08 PROBLEM — E78.5 DYSLIPIDEMIA: Status: ACTIVE | Noted: 2023-03-08

## 2023-04-07 PROCEDURE — RXMED WILLOW AMBULATORY MEDICATION CHARGE: Performed by: STUDENT IN AN ORGANIZED HEALTH CARE EDUCATION/TRAINING PROGRAM

## 2023-04-10 ENCOUNTER — HOSPITAL ENCOUNTER (OUTPATIENT)
Dept: RADIOLOGY | Facility: MEDICAL CENTER | Age: 78
End: 2023-04-10
Attending: STUDENT IN AN ORGANIZED HEALTH CARE EDUCATION/TRAINING PROGRAM
Payer: MEDICARE

## 2023-04-10 DIAGNOSIS — Z12.31 VISIT FOR SCREENING MAMMOGRAM: ICD-10-CM

## 2023-04-10 DIAGNOSIS — M85.852 OSTEOPENIA OF LEFT HIP: ICD-10-CM

## 2023-04-10 PROCEDURE — 77080 DXA BONE DENSITY AXIAL: CPT

## 2023-04-10 PROCEDURE — 77063 BREAST TOMOSYNTHESIS BI: CPT

## 2023-04-24 ENCOUNTER — PHARMACY VISIT (OUTPATIENT)
Dept: PHARMACY | Facility: MEDICAL CENTER | Age: 78
End: 2023-04-24
Payer: MEDICARE

## 2023-06-19 ENCOUNTER — TELEPHONE (OUTPATIENT)
Dept: PHYSICAL THERAPY | Facility: REHABILITATION | Age: 78
End: 2023-06-19
Payer: MEDICARE

## 2023-06-19 NOTE — OP THERAPY DISCHARGE SUMMARY
Outpatient Physical Therapy  DISCHARGE SUMMARY NOTE      [facility] [address], [address]  [place] [address]  Phone:  [phone]    Date of Visit: 06/19/2023    Patient: Katy Perez  YOB: 1945  MRN: [id number]     Referring Provider: Dori Mason DO   Referring Diagnosis No admission diagnoses are documented for this encounter.         Functional Assessment Used        Your patient is being discharged from Physical Therapy with the following comments:   Patient has failed to schedule or reschedule follow-up visits    Comments:  Patient has failed to schedule follow up visits; no contact since last visit and lapse in care >30 days at this time. Due to company policy patient will be discharged and in need of a new referral should skilled PT care be needed. Recommend follow up with PCP for ongoing issues.     Farrukh Thompson, PT    Date: 6/19/2023

## 2023-07-31 PROCEDURE — RXMED WILLOW AMBULATORY MEDICATION CHARGE: Performed by: STUDENT IN AN ORGANIZED HEALTH CARE EDUCATION/TRAINING PROGRAM

## 2023-08-08 ENCOUNTER — PHARMACY VISIT (OUTPATIENT)
Dept: PHARMACY | Facility: MEDICAL CENTER | Age: 78
End: 2023-08-08
Payer: MEDICARE

## 2023-08-12 NOTE — PROGRESS NOTES
"Subjective     Katy Perez is a 76 y.o. female who presents with Chronic Kidney Disease and Hypertension            Patient is a pleasant 76-year-old lady with past medical history significant for longstanding hypertension, chronic kidney disease stage III, creatinine has been overall stable for the last 5 to 7 years, patient has no recent use of NSAIDs or IV contrast.    Chronic Kidney Disease  This is a chronic problem. The current episode started more than 1 year ago. The problem occurs constantly. The problem has been unchanged. Pertinent negatives include no chest pain, chills, coughing, fever, nausea, urinary symptoms or vomiting.   Hypertension  This is a chronic problem. The current episode started more than 1 year ago. The problem is unchanged. The problem is controlled. Pertinent negatives include no chest pain, malaise/fatigue, peripheral edema or shortness of breath. Risk factors for coronary artery disease include post-menopausal state. Past treatments include angiotensin blockers. The current treatment provides significant improvement. There are no compliance problems.  Hypertensive end-organ damage includes kidney disease. Identifiable causes of hypertension include chronic renal disease.       Review of Systems   Constitutional: Negative for chills, fever and malaise/fatigue.   Respiratory: Negative for cough and shortness of breath.    Cardiovascular: Negative for chest pain and leg swelling.   Gastrointestinal: Negative for nausea and vomiting.   Genitourinary: Negative for dysuria, frequency and urgency.   Musculoskeletal: Positive for joint pain.              Objective     /72 (BP Location: Right arm, Patient Position: Sitting, BP Cuff Size: Adult)   Pulse (!) 54   Temp 36.7 °C (98.1 °F) (Temporal)   Ht 1.702 m (5' 7\")   Wt 81.6 kg (180 lb)   SpO2 98%   BMI 28.19 kg/m²      Physical Exam  Vitals and nursing note reviewed.   Constitutional:       General: She is not in " acute distress.     Appearance: Normal appearance. She is well-developed. She is not diaphoretic.   HENT:      Head: Normocephalic and atraumatic.      Nose: Nose normal.   Eyes:      General: No scleral icterus.        Right eye: No discharge.         Left eye: No discharge.      Conjunctiva/sclera: Conjunctivae normal.   Cardiovascular:      Rate and Rhythm: Normal rate and regular rhythm.      Heart sounds: No murmur heard.     Pulmonary:      Effort: Pulmonary effort is normal. No respiratory distress.      Breath sounds: Normal breath sounds.   Musculoskeletal:         General: No tenderness.      Right lower leg: No edema.      Left lower leg: No edema.   Skin:     General: Skin is warm and dry.      Findings: No erythema.   Neurological:      General: No focal deficit present.      Mental Status: She is alert and oriented to person, place, and time.      Cranial Nerves: No cranial nerve deficit.   Psychiatric:         Mood and Affect: Mood normal.         Behavior: Behavior normal.       Past Medical History:   Diagnosis Date   • Arthritis 5/16/2012    bilateral knees   • Hypertension     states well controlled on meds   • MEDICAL HOME 11/8/12   • Pain     chronic foot pain     Social History     Socioeconomic History   • Marital status:      Spouse name: Not on file   • Number of children: Not on file   • Years of education: Not on file   • Highest education level: Some college, no degree   Occupational History   • Not on file   Tobacco Use   • Smoking status: Never Smoker   • Smokeless tobacco: Never Used   Vaping Use   • Vaping Use: Never used   Substance and Sexual Activity   • Alcohol use: Yes     Alcohol/week: 4.2 - 6.0 oz     Types: 7 - 10 Glasses of wine per week   • Drug use: Yes     Frequency: 1.0 times per week     Types: Marijuana     Comment: smokes couple times a week    • Sexual activity: Not Currently     Partners: Male   Other Topics Concern   • Not on file   Social History Narrative    • Not on file     Social Determinants of Health     Financial Resource Strain: Low Risk    • Difficulty of Paying Living Expenses: Not hard at all   Food Insecurity: No Food Insecurity   • Worried About Running Out of Food in the Last Year: Never true   • Ran Out of Food in the Last Year: Never true   Transportation Needs: No Transportation Needs   • Lack of Transportation (Medical): No   • Lack of Transportation (Non-Medical): No   Physical Activity: Sufficiently Active   • Days of Exercise per Week: 7 days   • Minutes of Exercise per Session: 120 min   Stress: No Stress Concern Present   • Feeling of Stress : Only a little   Social Connections: Moderately Isolated   • Frequency of Communication with Friends and Family: Once a week   • Frequency of Social Gatherings with Friends and Family: Never   • Attends [employer] Services: Never   • Active Member of Clubs or Organizations: Yes   • Attends Club or Organization Meetings: More than 4 times per year   • Marital Status:    Intimate Partner Violence:    • Fear of Current or Ex-Partner:    • Emotionally Abused:    • Physically Abused:    • Sexually Abused:      Family History   Problem Relation Age of Onset   • Hypertension Father    • Heart Attack Father    • Heart Failure Mother    • Breast Cancer Maternal Grandmother    • No Known Problems Sister    • Heart Attack Maternal Grandfather    • No Known Problems Paternal Grandmother    • No Known Problems Paternal Grandfather      Recent Labs     10/01/20  1257 02/02/21  1358 06/17/21  1359   ALBUMIN 4.2 4.3 4.3   HDL 62  --   --    TRIGLYCERIDE 90  --   --    SODIUM 141 140 142   POTASSIUM 4.3 4.0 5.2   CHLORIDE 108 106 107   CO2 21 25 24   BUN 22 19 25*   CREATININE 0.94 1.10 0.95                             Assessment & Plan        1. Hyperparathyroidism (HCC)  Possible primary hyperparathyroidism  Repeat labs including intact PTH  - NM-PARATHYROID (SESTAMIBI) SCAN; Future    2. Hypercalcemia  Most likely  secondary to hyperparathyroidism  Discontinue vitamin D  Recheck labs    3. Stage 3 chronic kidney disease, unspecified whether stage 3a or 3b CKD (HCC)  Etiology is most likely age-related changes  Stable  No uremic symptoms  Renal dose of medication  Avoid nephrotoxins  Continue same medication regimen      Over 50% of this 45 minute visit was spent on counseling and coordination of care regarding diet, side effects, and complication.                   [[id number]],[[email or web address]_[email or web address]:[id number]],[[email or web address]_FT:[id number]]

## 2023-08-29 ENCOUNTER — HOSPITAL ENCOUNTER (OUTPATIENT)
Dept: LAB | Facility: MEDICAL CENTER | Age: 78
End: 2023-08-29
Attending: STUDENT IN AN ORGANIZED HEALTH CARE EDUCATION/TRAINING PROGRAM
Payer: MEDICARE

## 2023-08-29 DIAGNOSIS — E89.2 S/P REMOVAL OF PARATHYROID GLAND: ICD-10-CM

## 2023-08-29 DIAGNOSIS — E78.00 ELEVATED LDL CHOLESTEROL LEVEL: ICD-10-CM

## 2023-08-29 DIAGNOSIS — N18.2 STAGE 2 CHRONIC KIDNEY DISEASE: ICD-10-CM

## 2023-08-29 LAB
25(OH)D3 SERPL-MCNC: 52 NG/ML (ref 30–100)
ALBUMIN SERPL BCP-MCNC: 4.1 G/DL (ref 3.2–4.9)
ALBUMIN/GLOB SERPL: 2 G/DL
ALP SERPL-CCNC: 107 U/L (ref 30–99)
ALT SERPL-CCNC: 13 U/L (ref 2–50)
ANION GAP SERPL CALC-SCNC: 9 MMOL/L (ref 7–16)
AST SERPL-CCNC: 19 U/L (ref 12–45)
BASOPHILS # BLD AUTO: 0.9 % (ref 0–1.8)
BASOPHILS # BLD: 0.07 K/UL (ref 0–0.12)
BILIRUB SERPL-MCNC: 0.4 MG/DL (ref 0.1–1.5)
BUN SERPL-MCNC: 21 MG/DL (ref 8–22)
CALCIUM ALBUM COR SERPL-MCNC: 8.5 MG/DL (ref 8.5–10.5)
CALCIUM SERPL-MCNC: 8.6 MG/DL (ref 8.5–10.5)
CHLORIDE SERPL-SCNC: 108 MMOL/L (ref 96–112)
CHOLEST SERPL-MCNC: 178 MG/DL (ref 100–199)
CO2 SERPL-SCNC: 25 MMOL/L (ref 20–33)
CREAT SERPL-MCNC: 1.05 MG/DL (ref 0.5–1.4)
EOSINOPHIL # BLD AUTO: 0.27 K/UL (ref 0–0.51)
EOSINOPHIL NFR BLD: 3.5 % (ref 0–6.9)
ERYTHROCYTE [DISTWIDTH] IN BLOOD BY AUTOMATED COUNT: 47.9 FL (ref 35.9–50)
FASTING STATUS PATIENT QL REPORTED: NORMAL
GFR SERPLBLD CREATININE-BSD FMLA CKD-EPI: 55 ML/MIN/1.73 M 2
GLOBULIN SER CALC-MCNC: 2.1 G/DL (ref 1.9–3.5)
GLUCOSE SERPL-MCNC: 98 MG/DL (ref 65–99)
HCT VFR BLD AUTO: 41.3 % (ref 37–47)
HDLC SERPL-MCNC: 48 MG/DL
HGB BLD-MCNC: 13.2 G/DL (ref 12–16)
IMM GRANULOCYTES # BLD AUTO: 0.02 K/UL (ref 0–0.11)
IMM GRANULOCYTES NFR BLD AUTO: 0.3 % (ref 0–0.9)
LDLC SERPL CALC-MCNC: 112 MG/DL
LYMPHOCYTES # BLD AUTO: 1.75 K/UL (ref 1–4.8)
LYMPHOCYTES NFR BLD: 22.6 % (ref 22–41)
MCH RBC QN AUTO: 30.3 PG (ref 27–33)
MCHC RBC AUTO-ENTMCNC: 32 G/DL (ref 32.2–35.5)
MCV RBC AUTO: 94.7 FL (ref 81.4–97.8)
MONOCYTES # BLD AUTO: 0.52 K/UL (ref 0–0.85)
MONOCYTES NFR BLD AUTO: 6.7 % (ref 0–13.4)
NEUTROPHILS # BLD AUTO: 5.1 K/UL (ref 1.82–7.42)
NEUTROPHILS NFR BLD: 66 % (ref 44–72)
NRBC # BLD AUTO: 0 K/UL
NRBC BLD-RTO: 0 /100 WBC (ref 0–0.2)
PLATELET # BLD AUTO: 314 K/UL (ref 164–446)
PMV BLD AUTO: 10.8 FL (ref 9–12.9)
POTASSIUM SERPL-SCNC: 4.4 MMOL/L (ref 3.6–5.5)
PROT SERPL-MCNC: 6.2 G/DL (ref 6–8.2)
PTH-INTACT SERPL-MCNC: 40.5 PG/ML (ref 14–72)
RBC # BLD AUTO: 4.36 M/UL (ref 4.2–5.4)
SODIUM SERPL-SCNC: 142 MMOL/L (ref 135–145)
TRIGL SERPL-MCNC: 88 MG/DL (ref 0–149)
TSH SERPL DL<=0.005 MIU/L-ACNC: 2.78 UIU/ML (ref 0.38–5.33)
WBC # BLD AUTO: 7.7 K/UL (ref 4.8–10.8)

## 2023-08-29 PROCEDURE — 80061 LIPID PANEL: CPT

## 2023-08-29 PROCEDURE — 80053 COMPREHEN METABOLIC PANEL: CPT

## 2023-08-29 PROCEDURE — 83970 ASSAY OF PARATHORMONE: CPT

## 2023-08-29 PROCEDURE — 84443 ASSAY THYROID STIM HORMONE: CPT

## 2023-08-29 PROCEDURE — 85025 COMPLETE CBC W/AUTO DIFF WBC: CPT

## 2023-08-29 PROCEDURE — 82306 VITAMIN D 25 HYDROXY: CPT

## 2023-08-29 PROCEDURE — 36415 COLL VENOUS BLD VENIPUNCTURE: CPT

## 2023-09-06 SDOH — ECONOMIC STABILITY: FOOD INSECURITY: WITHIN THE PAST 12 MONTHS, YOU WORRIED THAT YOUR FOOD WOULD RUN OUT BEFORE YOU GOT MONEY TO BUY MORE.: NEVER TRUE

## 2023-09-06 SDOH — HEALTH STABILITY: PHYSICAL HEALTH: ON AVERAGE, HOW MANY MINUTES DO YOU ENGAGE IN EXERCISE AT THIS LEVEL?: 90 MIN

## 2023-09-06 SDOH — ECONOMIC STABILITY: INCOME INSECURITY: HOW HARD IS IT FOR YOU TO PAY FOR THE VERY BASICS LIKE FOOD, HOUSING, MEDICAL CARE, AND HEATING?: NOT HARD AT ALL

## 2023-09-06 SDOH — ECONOMIC STABILITY: TRANSPORTATION INSECURITY

## 2023-09-06 SDOH — ECONOMIC STABILITY: FOOD INSECURITY: WITHIN THE PAST 12 MONTHS, THE FOOD YOU BOUGHT JUST DIDN'T LAST AND YOU DIDN'T HAVE MONEY TO GET MORE.: NEVER TRUE

## 2023-09-06 SDOH — ECONOMIC STABILITY: INCOME INSECURITY: IN THE LAST 12 MONTHS, WAS THERE A TIME WHEN YOU WERE NOT ABLE TO PAY THE MORTGAGE OR RENT ON TIME?: NO

## 2023-09-06 SDOH — HEALTH STABILITY: PHYSICAL HEALTH: ON AVERAGE, HOW MANY DAYS PER WEEK DO YOU ENGAGE IN MODERATE TO STRENUOUS EXERCISE (LIKE A BRISK WALK)?: 7 DAYS

## 2023-09-06 SDOH — ECONOMIC STABILITY: HOUSING INSECURITY

## 2023-09-06 SDOH — HEALTH STABILITY: MENTAL HEALTH
STRESS IS WHEN SOMEONE FEELS TENSE, NERVOUS, ANXIOUS, OR CAN'T SLEEP AT NIGHT BECAUSE THEIR MIND IS TROUBLED. HOW STRESSED ARE YOU?: NOT AT ALL

## 2023-09-06 ASSESSMENT — LIFESTYLE VARIABLES
SKIP TO QUESTIONS 9-10: 1
AUDIT-C TOTAL SCORE: 4
HOW OFTEN DO YOU HAVE SIX OR MORE DRINKS ON ONE OCCASION: NEVER
HOW MANY STANDARD DRINKS CONTAINING ALCOHOL DO YOU HAVE ON A TYPICAL DAY: 1 OR 2
HOW OFTEN DO YOU HAVE A DRINK CONTAINING ALCOHOL: 4 OR MORE TIMES A WEEK

## 2023-09-06 ASSESSMENT — SOCIAL DETERMINANTS OF HEALTH (SDOH)
HOW OFTEN DO YOU GET TOGETHER WITH FRIENDS OR RELATIVES?: NEVER
WITHIN THE PAST 12 MONTHS, YOU WORRIED THAT YOUR FOOD WOULD RUN OUT BEFORE YOU GOT THE MONEY TO BUY MORE: NEVER TRUE
HOW OFTEN DO YOU ATTENT MEETINGS OF THE CLUB OR ORGANIZATION YOU BELONG TO?: MORE THAN 4 TIMES PER YEAR
HOW OFTEN DO YOU GET TOGETHER WITH FRIENDS OR RELATIVES?: NEVER
HOW OFTEN DO YOU ATTEND CHURCH OR RELIGIOUS SERVICES?: NEVER
DO YOU BELONG TO ANY CLUBS OR ORGANIZATIONS SUCH AS CHURCH GROUPS UNIONS, FRATERNAL OR ATHLETIC GROUPS, OR SCHOOL GROUPS?: YES
HOW HARD IS IT FOR YOU TO PAY FOR THE VERY BASICS LIKE FOOD, HOUSING, MEDICAL CARE, AND HEATING?: NOT HARD AT ALL
HOW OFTEN DO YOU HAVE A DRINK CONTAINING ALCOHOL: 4 OR MORE TIMES A WEEK
DO YOU BELONG TO ANY CLUBS OR ORGANIZATIONS SUCH AS CHURCH GROUPS UNIONS, FRATERNAL OR ATHLETIC GROUPS, OR SCHOOL GROUPS?: YES
IN A TYPICAL WEEK, HOW MANY TIMES DO YOU TALK ON THE PHONE WITH FAMILY, FRIENDS, OR NEIGHBORS?: ONCE A WEEK
IN A TYPICAL WEEK, HOW MANY TIMES DO YOU TALK ON THE PHONE WITH FAMILY, FRIENDS, OR NEIGHBORS?: ONCE A WEEK
HOW MANY DRINKS CONTAINING ALCOHOL DO YOU HAVE ON A TYPICAL DAY WHEN YOU ARE DRINKING: 1 OR 2
HOW OFTEN DO YOU ATTEND CHURCH OR RELIGIOUS SERVICES?: NEVER
HOW OFTEN DO YOU HAVE SIX OR MORE DRINKS ON ONE OCCASION: NEVER
HOW OFTEN DO YOU ATTENT MEETINGS OF THE CLUB OR ORGANIZATION YOU BELONG TO?: MORE THAN 4 TIMES PER YEAR

## 2023-09-07 ENCOUNTER — OFFICE VISIT (OUTPATIENT)
Dept: MEDICAL GROUP | Facility: PHYSICIAN GROUP | Age: 78
End: 2023-09-07
Payer: MEDICARE

## 2023-09-07 VITALS
RESPIRATION RATE: 20 BRPM | HEIGHT: 67 IN | BODY MASS INDEX: 27 KG/M2 | SYSTOLIC BLOOD PRESSURE: 142 MMHG | DIASTOLIC BLOOD PRESSURE: 74 MMHG | HEART RATE: 96 BPM | WEIGHT: 172 LBS | OXYGEN SATURATION: 97 % | TEMPERATURE: 98.2 F

## 2023-09-07 DIAGNOSIS — R00.2 PALPITATIONS: ICD-10-CM

## 2023-09-07 DIAGNOSIS — I10 ESSENTIAL HYPERTENSION: ICD-10-CM

## 2023-09-07 DIAGNOSIS — R42 LIGHTHEADEDNESS: ICD-10-CM

## 2023-09-07 DIAGNOSIS — I44.0 FIRST DEGREE HEART BLOCK: ICD-10-CM

## 2023-09-07 DIAGNOSIS — N18.31 STAGE 3A CHRONIC KIDNEY DISEASE: ICD-10-CM

## 2023-09-07 PROBLEM — E66.3 OVERWEIGHT WITH BODY MASS INDEX (BMI) OF 29 TO 29.9 IN ADULT: Status: RESOLVED | Noted: 2022-04-18 | Resolved: 2023-09-07

## 2023-09-07 PROCEDURE — 3077F SYST BP >= 140 MM HG: CPT | Performed by: STUDENT IN AN ORGANIZED HEALTH CARE EDUCATION/TRAINING PROGRAM

## 2023-09-07 PROCEDURE — 99215 OFFICE O/P EST HI 40 MIN: CPT | Performed by: STUDENT IN AN ORGANIZED HEALTH CARE EDUCATION/TRAINING PROGRAM

## 2023-09-07 PROCEDURE — 93000 ELECTROCARDIOGRAM COMPLETE: CPT | Performed by: STUDENT IN AN ORGANIZED HEALTH CARE EDUCATION/TRAINING PROGRAM

## 2023-09-07 PROCEDURE — 3078F DIAST BP <80 MM HG: CPT | Performed by: STUDENT IN AN ORGANIZED HEALTH CARE EDUCATION/TRAINING PROGRAM

## 2023-09-07 ASSESSMENT — ENCOUNTER SYMPTOMS
SPEECH CHANGE: 0
SHORTNESS OF BREATH: 0
SPUTUM PRODUCTION: 0
CHILLS: 0
COUGH: 0
DIAPHORESIS: 0
DIARRHEA: 0
WEIGHT LOSS: 1
CONSTIPATION: 0
PALPITATIONS: 1
ABDOMINAL PAIN: 0
FEVER: 0
HEADACHES: 0
DIZZINESS: 1
VOMITING: 0
TINGLING: 1
LOSS OF CONSCIOUSNESS: 0
NAUSEA: 0
HEMOPTYSIS: 0
FOCAL WEAKNESS: 0

## 2023-09-07 ASSESSMENT — FIBROSIS 4 INDEX: FIB4 SCORE: 1.28

## 2023-09-07 NOTE — PROGRESS NOTES
Subjective:     No chief complaint on file.      HPI:   Katy Perez is a 78 y.o. female who presents for annual exam. She is feeling well and denies any complaints.    Ob-Gyn/ History:    Patient has GYN provider: {YES/NO:63}  /Para:  ***  Last Pap Smear:  ***. *** history of abnormal pap smears.  Gyn Surgery:  ***.  Patient is *** currently sexually active, with*** complaints of dyspareunia.  Post-menopausal bleeding: ***  Urinary incontinence: ***  No significant bloating/fluid retention, pelvic pain, or vaginal discharge.    Health Maintenance  Advanced directive: ***   Osteoporosis Screen/ DEXA: 2023   Cholesterol Screening: ***   Diabetes Screening: ***   Aspirin Use: ***    Diet: ***   Exercise: ***   Substance Abuse: ***     Cancer screening  Colorectal Cancer Screenin2012, recall 10yrs   Lung Cancer Screening: ***  {50-80 yearly screen with 20 pack-years, currently smoke or quit <15 years. USPSTF: B}  Cervical Cancer Screening: aged out   Breast Cancer Screenin2023    Infectious disease screening/Immunizations  --STI Screening: ***   --Practices safe sex.  --HIV Screening: *** No risk factors.  --Hepatitis C Screening: complete   --Immunizations: Reviewed with patient.     She  has a past medical history of Arthritis (2012), Blood clotting disorder (HCC), Hypertension, Hypocalcemia (2021), MEDICAL HOME (12), Pain, and Renal disorder.    She has no past medical history of Breast cancer (HCC) or Clotting disorder (HCC).  She  has a past surgical history that includes pr breast augmentation with implant; gyn surgery (); bunionectomy drew (Right, 1/10/2017); hammertoe correction (Right, 1/10/2017); other (); knee arthroplasty total (2013); other orthopedic surgery (); and pr explore parathyroid glands (Right, 2021).    Family History   Problem Relation Age of Onset    Hypertension Father     Heart Attack Father     Heart Failure Mother      Breast Cancer Maternal Grandmother     No Known Problems Sister     Heart Attack Maternal Grandfather     No Known Problems Paternal Grandmother     No Known Problems Paternal Grandfather        Social History     Socioeconomic History    Marital status:      Spouse name: Not on file    Number of children: Not on file    Years of education: Not on file    Highest education level: Some college, no degree   Occupational History    Not on file   Tobacco Use    Smoking status: Never    Smokeless tobacco: Never   Vaping Use    Vaping Use: Never used   Substance and Sexual Activity    Alcohol use: Yes     Alcohol/week: 4.2 - 6.0 oz     Types: 7 - 10 Glasses of wine per week    Drug use: Yes     Frequency: 1.0 times per week     Types: Marijuana, Oral     Comment: smokes marijuana couple times a week; CBD oil  a couple times a week    Sexual activity: Not Currently     Partners: Male   Other Topics Concern    Not on file   Social History Narrative    Not on file     Social Determinants of Health     Financial Resource Strain: Low Risk  (9/6/2023)    Overall Financial Resource Strain (CARDIA)     Difficulty of Paying Living Expenses: Not hard at all   Food Insecurity: No Food Insecurity (9/6/2023)    Hunger Vital Sign     Worried About Running Out of Food in the Last Year: Never true     Ran Out of Food in the Last Year: Never true   Transportation Needs: Unknown (9/6/2023)    PRAPARE - Transportation     Lack of Transportation (Medical): No     Lack of Transportation (Non-Medical): Not on file   Physical Activity: Sufficiently Active (9/6/2023)    Exercise Vital Sign     Days of Exercise per Week: 7 days     Minutes of Exercise per Session: 90 min   Stress: No Stress Concern Present (9/6/2023)    [place] [employer] - Occupational Stress Questionnaire     Feeling of Stress : Not at all   Social Connections: Moderately Isolated (9/6/2023)    Social Connection and Isolation Panel [NHANES]      Frequency of Communication with Friends and Family: Once a week     Frequency of Social Gatherings with Friends and Family: Never     Attends [employer] Services: Never     Active Member of Clubs or Organizations: Yes     Attends Club or Organization Meetings: More than 4 times per year     Marital Status:    Intimate Partner Violence: Not on file   Housing Stability: Unknown (9/6/2023)    Housing Stability Vital Sign     Unable to Pay for Housing in the Last Year: No     Number of Places Lived in the Last Year: Not on file     Unstable Housing in the Last Year: No       Patient Active Problem List    Diagnosis Date Noted    Vitamin D deficiency 03/08/2023    Dyslipidemia 03/08/2023    BMI 29.0-29.9,adult 03/08/2023    PAD (peripheral artery disease) ([facility]) 03/08/2023    Neuropathy 03/08/2023    Elevated LDL cholesterol level 03/02/2023    Overweight with body mass index (BMI) of 29 to 29.9 in adult 04/18/2022    Disease of liver 12/07/2021    Disorder of vein 12/07/2021    Joint pain 12/07/2021    History of parathyroidectomy ([facility]) 12/07/2021    History of vitamin D deficiency 12/07/2021    Osteopenia of left hip 08/24/2021    Aortic atherosclerosis (HCC) 08/24/2021    CKD (chronic kidney disease)     History of pulmonary embolism 05/22/2018    Essential hypertension 05/02/2016         Current Outpatient Medications   Medication Sig Dispense Refill    VITAMIN D PO Take  by mouth.      losartan (COZAAR) 50 MG Tab Take 1 tablet by mouth every day. 100 Tablet 3    Calcium Carbonate Antacid (TUMS ULTRA 1000) 1000 MG Chew Tab Chew 2 Tablets every morning. 90 Tablet 3     No current facility-administered medications for this visit.     Allergies   Allergen Reactions    Pcn [Penicillins] Rash    Statins [Hmg-Coa-R Inhibitors]        Review of Systems ***  Constitutional: Negative for fever, chills and malaise/fatigue.   HENT: Negative for congestion.    Eyes: Negative for pain.    Respiratory: Negative for cough and  shortness of breath.  Cardiovascular: Negative for leg swelling.   Gastrointestinal: Negative for nausea, vomiting, abdominal pain and diarrhea.   Genitourinary: Negative for dysuria and hematuria.   Skin: Negative for rash.   Neurological: Negative for dizziness, focal weakness and headaches.   Endo/Heme/Allergies: Does not bleed easily.   Psychiatric/Behavioral: Negative for depression.  The patient is not nervous/anxious.      Objective:     There were no vitals taken for this visit.  There is no height or weight on file to calculate BMI.  Wt Readings from Last 4 Encounters:   03/08/23 83 kg (183 lb)   03/02/23 83.5 kg (184 lb)   04/18/22 82.6 kg (182 lb 3.2 oz)   02/24/22 84.8 kg (187 lb)       Physical Exam:  Constitutional: Well-developed and well-nourished. Not diaphoretic. No distress.   Skin: Skin is warm and dry. No rash noted.  Head: Atraumatic without lesions.  Eyes: Conjunctivae and extraocular motions are normal. Pupils are equal, round. No scleral icterus.   Ears:  External ears unremarkable. Tympanic membranes clear and intact.  Nose: Nares patent. No discharge.   Mouth/Throat: Dentition is ***. Tongue normal. Oropharynx is clear and moist. Posterior pharynx without erythema or exudates.  Neck: Supple, trachea midline. Normal range of motion. No thyromegaly present. No lymphadenopathy--cervical or supraclavicular.  Cardiovascular: Regular rate and rhythm, S1 and S2 without murmur, rubs, or gallops.  Lungs: Normal inspiratory effort, CTA bilaterally, no wheezes/rhonchi/rales  Breast: Breasts examined seated and supine. No skin changes, peau d'orange or nipple retraction. No discharge. No axillary or supraclavicular adenopathy. No masses or nodularity palpable. ***  Abdomen: Soft, non tender, and without distention. Active bowel sounds in all four quadrants. No rebound, guarding, masses or HSM.  :Perineum and external genitalia normal without rash. Vagina with {GYN VAGINAL DISCHARGE:721} discharge.  Cervix without visible lesions or discharge. Bimanual exam without adnexal masses or cervical motion tenderness.***  Extremities: No cyanosis, clubbing, erythema, nor edema.   Musculoskeletal: ***  Neurological: Alert and oriented x 3. No gross or focal deficits.   Psychiatric:  Behavior, mood, and affect are appropriate.    A chaperone was offered to the patient during today's exam. {CHAPERONE:82167}    Labs: Reviewed from ***  Imaging: Reviewed from ***    Assessment and Plan:     There are no diagnoses linked to this encounter.     HCM: Reviewed with patient as above.  Immunizations discussed/recommended.  Age-appropriate anticipatory guidance discussed: sun screen, dental visits, healthy diet/exercise, AHA recommendation for ETOH intake and tobacco cessation.      Current Outpatient Medications:     VITAMIN D PO, Take  by mouth., Disp: , Rfl:     losartan (COZAAR) 50 MG Tab, Take 1 tablet by mouth every day., Disp: 100 Tablet, Rfl: 3    Calcium Carbonate Antacid (TUMS ULTRA 1000) 1000 MG Chew Tab, Chew 2 Tablets every morning., Disp: 90 Tablet, Rfl: 3    Follow-up: No follow-ups on file.

## 2023-09-07 NOTE — PATIENT INSTRUCTIONS
Ideal blood pressure <130/80 and at least <140/90.  If <100 for the top number we are over treating.  Bring in your log/machine to follow up    Vaccines due that can be received at the pharmacy:  Shingrix series (shingles)  COVID booster  Flu (we will have it here after 9/26)  RSV    I will be transferring to the clinic below 10/10/2023.  [facility] - [address] [place] [address]     I will be happy to continue to be your primary care provider if you'd like to follow me. If not please call [phone] to establish care with new provider

## 2023-09-07 NOTE — ASSESSMENT & PLAN NOTE
This is a chronic condition for the past 4 to 5 months but becoming more frequent occurring multiple times a day.  States that she has episodes of palpitations which seem irregular and thumping but not fast.  These episodes last 10 to 20 seconds and resolve on their own.  The episodes are associated with lightheadedness and occasionally tingling in her fingertips but no chest pain or shortness of breath. This typically occurs at rest, random throughout the day.  Not associated with diaphoresis or headaches.    Not lightheaded, chest pain or shortness of breath with exertion.  Denies shortness of breath at rest as well.  Checks her blood pressure when this occurs, not high or low during episodes.    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  Wondering about something like mitral valve prolapse.    Has been drinking decaf coffee for the past few weeks and not any better. Drinks enough water.

## 2023-09-07 NOTE — ASSESSMENT & PLAN NOTE
This is a chronic condition.  Patient states that she had her annual Medicare wellness visit and her blood pressure was quite high there.  Has been taking measurements at home and brings in a log.  Intermittently elevated.  Continues with losartan 50 mg daily.  Blood pressure this morning 122/66 patient

## 2023-09-07 NOTE — PROGRESS NOTES
Subjective:     Chief Complaint   Patient presents with    Annual Exam    Hypertension    Palpitations     HPI:   Katy presents today for an annual exam but has concerns about palpitations and hypertension.    Palpitations  This is a chronic condition for the past 4 to 5 months but becoming more frequent occurring multiple times a day.  States that she has episodes of palpitations which seem irregular and thumping but not fast.  These episodes last 10 to 20 seconds and resolve on their own.  The episodes are associated with lightheadedness and occasionally tingling in her fingertips but no chest pain or shortness of breath. This typically occurs at rest, random throughout the day.  Not associated with diaphoresis or headaches.    Not lightheaded, chest pain or shortness of breath with exertion.  Denies shortness of breath at rest as well.  Checks her blood pressure when this occurs, not high or low during episodes.    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  Wondering about something like mitral valve prolapse.    Has been drinking decaf coffee for the past few weeks and not any better. Drinks enough water.    Essential hypertension  This is a chronic condition.  Patient states that she had her annual Medicare wellness visit and her blood pressure was quite high there.  Has been taking measurements at home and brings in a log.  Intermittently elevated.  Continues with losartan 50 mg daily.  Blood pressure this morning 122/66 patient    Review of Systems   Constitutional:  Positive for weight loss (intentional). Negative for chills, diaphoresis, fever and malaise/fatigue.   Respiratory:  Negative for cough (In the AM only), hemoptysis, sputum production and shortness of breath.    Cardiovascular:  Positive for palpitations. Negative for chest pain and leg swelling.   Gastrointestinal:  Negative for abdominal pain, constipation, diarrhea, nausea and  "vomiting.   Neurological:  Positive for dizziness and tingling. Negative for speech change, focal weakness, loss of consciousness and headaches.     Objective:     Exam:  BP (!) 142/74 (BP Location: Left arm, Patient Position: Sitting, BP Cuff Size: Adult)   Pulse 96   Temp 36.8 °C (98.2 °F) (Temporal)   Resp 20   Ht 1.689 m (5' 6.5\")   Wt 78 kg (172 lb)   SpO2 97%   BMI 27.35 kg/m²  Body mass index is 27.35 kg/m².    Physical Exam  Vitals reviewed.   Constitutional:       General: She is not in acute distress.     Appearance: Normal appearance. She is not ill-appearing.   HENT:      Head: Normocephalic and atraumatic.      Nose: Nose normal.      Mouth/Throat:      Mouth: Mucous membranes are moist.   Eyes:      Conjunctiva/sclera: Conjunctivae normal.   Cardiovascular:      Rate and Rhythm: Normal rate and regular rhythm.      Heart sounds: Normal heart sounds. No murmur heard.  Pulmonary:      Effort: Pulmonary effort is normal. No respiratory distress.      Breath sounds: Normal breath sounds. No stridor. No wheezing, rhonchi or rales.   Musculoskeletal:      Cervical back: Normal range of motion and neck supple. No rigidity or tenderness.      Right lower leg: No edema.      Left lower leg: No edema.   Skin:     General: Skin is warm and dry.   Neurological:      General: No focal deficit present.      Mental Status: She is alert and oriented to person, place, and time.   Psychiatric:         Mood and Affect: Mood normal.         Behavior: Behavior normal.         Thought Content: Thought content normal.       Labs: Reviewed from 8/29/2023  Imaging: Reviewed from 9/30/2020, 2/11/2021 (echo), 4/10/2023    EKG Interpretation   Ordered and interpreted by Dori Mason DO. Compared to 11/17/2021.  Rhythm: normal sinus   Rate: 64  Axis: normal   Intervals: , QRS normal, Qtc 443  Ectopy: none   ST Segments: No ST elevations or depressions  T Waves: no acute change, flat in lead III  Q Waves: none "   Clinical Impression: Normal sinus rhythm with first-degree AV block, artifact in V3    Assessment & Plan:     78 y.o. female with the following -     1. Palpitations  2. Lightheadedness  3. First degree heart block  Chronic palpitations for the past 3 to 5 months associated with lightheadedness without other associated symptoms with stable first-degree heart block on today's EKG.  Potentially PACs/PVCs.  Stressed importance of adequate hydration and avoiding stimulants like caffeine.  Reviewed prior echocardiogram, repeat.  Recent lab work without anemia, thyroid or electrolyte disturbances.  Will obtain 48-hour monitor to evaluate for arrhythmia.  Gave return precautions.  - EKG - Clinic Performed  - [facility] ZIO PATCH MONITOR; Future  - EC-ECHOCARDIOGRAM COMPLETE W/O CONT; Future    4. Essential hypertension  This is a chronic condition, overall appears to be well controlled at home with few instances of mild hypertension in the 140s.  States this morning was at goal.  On repeat in clinic still elevated.  Plan to follow-up next week with a log and her machine to crosscheck.  For now continue losartan 50 mg daily.    5. Stage 3a chronic kidney disease (HCC)  Chronic condition. BP as above, continue ARB. Avoid nephrotoxic medications. Trend in 6m.    I spent a total of 43 minutes with record review, exam, communication with the patient, and documentation of this encounter.    Return in about 1 week (around 9/14/2023), or if symptoms worsen or fail to improve, for Blood pressure.    Please note that this dictation was created using voice recognition software. I have made every reasonable attempt to correct obvious errors, but I expect that there are errors of grammar and possibly content that I did not discover before finalizing the note.

## 2023-09-13 ENCOUNTER — NON-PROVIDER VISIT (OUTPATIENT)
Dept: CARDIOLOGY | Facility: MEDICAL CENTER | Age: 78
End: 2023-09-13
Attending: STUDENT IN AN ORGANIZED HEALTH CARE EDUCATION/TRAINING PROGRAM
Payer: MEDICARE

## 2023-09-13 DIAGNOSIS — R00.2 PALPITATIONS: ICD-10-CM

## 2023-09-13 DIAGNOSIS — I44.0 FIRST DEGREE HEART BLOCK: ICD-10-CM

## 2023-09-13 DIAGNOSIS — R42 LIGHTHEADEDNESS: ICD-10-CM

## 2023-09-13 PROCEDURE — 93226 XTRNL ECG REC<48 HR SCAN A/R: CPT

## 2023-09-13 PROCEDURE — 93225 XTRNL ECG REC<48 HRS REC: CPT

## 2023-09-13 PROCEDURE — 93227 XTRNL ECG REC<48 HR R&I: CPT | Performed by: STUDENT IN AN ORGANIZED HEALTH CARE EDUCATION/TRAINING PROGRAM

## 2023-09-13 NOTE — PROGRESS NOTES
Patient enrolled in the 48 hour three channel ePatch Holter monitoring program per Dori Mason DO.  >Office hook-up, serial # [id number].  >Currently pending EOS/ upload.

## 2023-09-15 ENCOUNTER — OFFICE VISIT (OUTPATIENT)
Dept: MEDICAL GROUP | Facility: PHYSICIAN GROUP | Age: 78
End: 2023-09-15
Payer: MEDICARE

## 2023-09-15 VITALS
BODY MASS INDEX: 27.31 KG/M2 | RESPIRATION RATE: 20 BRPM | DIASTOLIC BLOOD PRESSURE: 74 MMHG | OXYGEN SATURATION: 97 % | HEART RATE: 80 BPM | WEIGHT: 174 LBS | HEIGHT: 67 IN | SYSTOLIC BLOOD PRESSURE: 118 MMHG | TEMPERATURE: 97.3 F

## 2023-09-15 DIAGNOSIS — R00.2 PALPITATIONS: ICD-10-CM

## 2023-09-15 DIAGNOSIS — I10 ESSENTIAL HYPERTENSION: ICD-10-CM

## 2023-09-15 DIAGNOSIS — Z23 NEED FOR VACCINATION: ICD-10-CM

## 2023-09-15 DIAGNOSIS — R42 LIGHTHEADEDNESS: ICD-10-CM

## 2023-09-15 PROCEDURE — 99214 OFFICE O/P EST MOD 30 MIN: CPT | Mod: 25 | Performed by: STUDENT IN AN ORGANIZED HEALTH CARE EDUCATION/TRAINING PROGRAM

## 2023-09-15 PROCEDURE — G0008 ADMIN INFLUENZA VIRUS VAC: HCPCS | Performed by: STUDENT IN AN ORGANIZED HEALTH CARE EDUCATION/TRAINING PROGRAM

## 2023-09-15 PROCEDURE — 3074F SYST BP LT 130 MM HG: CPT | Performed by: STUDENT IN AN ORGANIZED HEALTH CARE EDUCATION/TRAINING PROGRAM

## 2023-09-15 PROCEDURE — 3078F DIAST BP <80 MM HG: CPT | Performed by: STUDENT IN AN ORGANIZED HEALTH CARE EDUCATION/TRAINING PROGRAM

## 2023-09-15 PROCEDURE — 90662 IIV NO PRSV INCREASED AG IM: CPT | Performed by: STUDENT IN AN ORGANIZED HEALTH CARE EDUCATION/TRAINING PROGRAM

## 2023-09-15 ASSESSMENT — ENCOUNTER SYMPTOMS
COUGH: 0
ABDOMINAL PAIN: 0
DIZZINESS: 1
SORE THROAT: 0
PALPITATIONS: 1
FEVER: 0
WEIGHT LOSS: 0
HEADACHES: 0
VOMITING: 0
CHILLS: 0
SHORTNESS OF BREATH: 0
NAUSEA: 0
DIARRHEA: 0

## 2023-09-15 ASSESSMENT — FIBROSIS 4 INDEX: FIB4 SCORE: 1.28

## 2023-09-15 NOTE — PROGRESS NOTES
"Subjective:     Chief Complaint   Patient presents with    Hypertension Follow-up       HPI:   Katy presents today w to follow-up on her blood pressure.    See last encounter for details.    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  Continues with losartan 50 mg daily.  Patient is quite active. 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  Patient denies any exertional symptoms.  Since her  had a heart attack she and him have been eating very well and she limits her salt intake.    Still with occasional palpitations that last 3 to 4 seconds.  States that she has a dropping sensation with lightheadedness associated with that but also at random will feel lightheaded.  Denies ever feeling lightheaded with exertion.  This only occurs at rest.  She just got her monitor today and plans to turn in soon.  Has her echocardiogram scheduled for mid October.  Still drinking a lot of water and only decaffeinated coffee.    Review of Systems   Constitutional:  Negative for chills, fever, malaise/fatigue and weight loss.   HENT:  Negative for congestion, ear pain and sore throat.    Respiratory:  Negative for cough and shortness of breath.    Cardiovascular:  Positive for palpitations. Negative for chest pain and leg swelling.   Gastrointestinal:  Negative for abdominal pain, diarrhea, nausea and vomiting.   Neurological:  Positive for dizziness (no vertigo). Negative for headaches.     Objective:     Exam:  /74 (BP Location: Left arm, Patient Position: Sitting, BP Cuff Size: Adult)   Pulse 80   Temp 36.3 °C (97.3 °F) (Temporal)   Resp 20   Ht 1.689 m (5' 6.5\")   Wt 78.9 kg (174 lb)   SpO2 97%   BMI 27.66 kg/m²  Body mass index is 27.66 kg/m².    Physical Exam  Vitals reviewed.   Constitutional:       General: She is not in acute distress.     Appearance: Normal appearance. She is not ill-appearing.   HENT:      Head: Normocephalic and atraumatic.      Right Ear: Tympanic membrane, ear canal and external ear normal.      Left " Ear: Tympanic membrane, ear canal and external ear normal.      Nose: Nose normal.      Mouth/Throat:      Mouth: Mucous membranes are moist.   Eyes:      Conjunctiva/sclera: Conjunctivae normal.   Cardiovascular:      Rate and Rhythm: Normal rate and regular rhythm.      Heart sounds: Normal heart sounds. No murmur heard.  Pulmonary:      Effort: Pulmonary effort is normal. No respiratory distress.      Breath sounds: Normal breath sounds. No stridor. No wheezing, rhonchi or rales.   Musculoskeletal:      Cervical back: Normal range of motion and neck supple. No rigidity or tenderness.      Right lower leg: No edema.      Left lower leg: No edema.   Lymphadenopathy:      Cervical: No cervical adenopathy.   Skin:     General: Skin is warm and dry.      Coloration: Skin is not jaundiced.   Neurological:      General: No focal deficit present.      Mental Status: She is alert and oriented to person, place, and time.      Gait: Gait normal.   Psychiatric:         Mood and Affect: Mood normal.         Behavior: Behavior normal.         Thought Content: Thought content normal.       Assessment & Plan:     78 y.o. female with the following -     1. Essential hypertension  Chronic, controlled. Continue below medication(s).  We checked her machine today and it seems accurate (maybe reading a little high, but we were chatting a bit during some measurements).  Discussed ideal ranges/return precautions.    losartan (COZAAR) 50 MG Tab, Take 1 tablet by mouth every day., Disp: 100 Tablet, Rfl: 3    2. Palpitations  3. Lightheadedness  This is a chronic condition for the past 3 to 5 months without associated symptoms and never with exertional complaints.  She has made some diet changes since her spouse had a heart attack with less sodium which may potentially be contributing to the lightheadedness.  Again cardiac exam normal today.  Has echocardiogram scheduled for mid October and will turn in her 48-hour monitor soon.  Plan  pending results, gave return precautions.    4. Need for vaccination  - INFLUENZA VACCINE, HIGH DOSE (65+ ONLY)    Return in about 5 weeks (around 10/19/2023), or if symptoms worsen or fail to improve, for monitor and echo f/u.    Please note that this dictation was created using voice recognition software. I have made every reasonable attempt to correct obvious errors, but I expect that there are errors of grammar and possibly content that I did not discover before finalizing the note.

## 2023-09-15 NOTE — PATIENT INSTRUCTIONS
I will be transferring to the clinic below October 2023.  [facility] [place]  [address] [place] [address]     [place] blood pressure <130/80 and at least <140/90.  If <100 for the top number we are over treating.

## 2023-09-25 ENCOUNTER — TELEPHONE (OUTPATIENT)
Dept: CARDIOLOGY | Facility: MEDICAL CENTER | Age: 78
End: 2023-09-25
Payer: MEDICARE

## 2023-09-26 PROCEDURE — 93227 XTRNL ECG REC<48 HR R&I: CPT | Performed by: INTERNAL MEDICINE

## 2023-09-26 NOTE — TELEPHONE ENCOUNTER
Angelia EOS Tracings to Tuesdays ADD Be's nurse Thania on 9/25/2023    
To BE: please read and sign as ADD; thank you!   
normal...

## 2023-10-11 ENCOUNTER — ANCILLARY PROCEDURE (OUTPATIENT)
Dept: CARDIOLOGY | Facility: MEDICAL CENTER | Age: 78
End: 2023-10-11
Attending: STUDENT IN AN ORGANIZED HEALTH CARE EDUCATION/TRAINING PROGRAM
Payer: MEDICARE

## 2023-10-11 DIAGNOSIS — R00.2 PALPITATIONS: ICD-10-CM

## 2023-10-11 DIAGNOSIS — I44.0 FIRST DEGREE HEART BLOCK: ICD-10-CM

## 2023-10-11 DIAGNOSIS — R42 LIGHTHEADEDNESS: ICD-10-CM

## 2023-10-11 LAB
LV EJECT FRACT  99904: 65
LV EJECT FRACT MOD 2C 99903: 65.1
LV EJECT FRACT MOD 4C 99902: 67.97
LV EJECT FRACT MOD BP 99901: 66.18

## 2023-10-11 PROCEDURE — 93306 TTE W/DOPPLER COMPLETE: CPT | Mod: 26 | Performed by: INTERNAL MEDICINE

## 2023-10-11 PROCEDURE — 93306 TTE W/DOPPLER COMPLETE: CPT

## 2023-10-25 ENCOUNTER — OFFICE VISIT (OUTPATIENT)
Dept: MEDICAL GROUP | Facility: LAB | Age: 78
End: 2023-10-25
Payer: MEDICARE

## 2023-10-25 VITALS
OXYGEN SATURATION: 95 % | BODY MASS INDEX: 29.27 KG/M2 | DIASTOLIC BLOOD PRESSURE: 80 MMHG | HEIGHT: 66 IN | HEART RATE: 59 BPM | RESPIRATION RATE: 20 BRPM | SYSTOLIC BLOOD PRESSURE: 122 MMHG | WEIGHT: 182.1 LBS | TEMPERATURE: 98 F

## 2023-10-25 DIAGNOSIS — N18.31 STAGE 3A CHRONIC KIDNEY DISEASE: ICD-10-CM

## 2023-10-25 DIAGNOSIS — R00.2 PALPITATIONS: ICD-10-CM

## 2023-10-25 DIAGNOSIS — M17.12 PRIMARY OSTEOARTHRITIS OF LEFT KNEE: ICD-10-CM

## 2023-10-25 PROCEDURE — 3074F SYST BP LT 130 MM HG: CPT | Performed by: STUDENT IN AN ORGANIZED HEALTH CARE EDUCATION/TRAINING PROGRAM

## 2023-10-25 PROCEDURE — 99214 OFFICE O/P EST MOD 30 MIN: CPT | Performed by: STUDENT IN AN ORGANIZED HEALTH CARE EDUCATION/TRAINING PROGRAM

## 2023-10-25 PROCEDURE — 3079F DIAST BP 80-89 MM HG: CPT | Performed by: STUDENT IN AN ORGANIZED HEALTH CARE EDUCATION/TRAINING PROGRAM

## 2023-10-25 ASSESSMENT — ENCOUNTER SYMPTOMS
CHILLS: 0
PALPITATIONS: 1
FEVER: 0
DIZZINESS: 1
SHORTNESS OF BREATH: 0
COUGH: 0
NAUSEA: 0
FALLS: 0
VOMITING: 0
WEIGHT LOSS: 0
DIARRHEA: 0
ABDOMINAL PAIN: 0
INSOMNIA: 1

## 2023-10-25 ASSESSMENT — FIBROSIS 4 INDEX: FIB4 SCORE: 1.28

## 2023-10-25 NOTE — PATIENT INSTRUCTIONS
non-fasting labs due 1-2 weeks prior to follow up     Sleep hygiene checklist:  -Avoid naps.  Napping during the day can disturb the normal pattern of sleep and wakefulness.  -Avoid stimulants, such as caffeine and nicotine, and alcohol as bedtime approaches.  While alcohol is well known to speed the onset of sleep, the process of the body metabolizing the alcohol can cause arousal, thus disrupting sleep.  -Exercise.  All forms of exercise help to ensure sound sleep.  Vigorous activity should be conducted in the morning or late afternoon, while a relaxing exercise, like yoga, can be done before bed to help initiate a restful night sleep.  -Avoid foods too close to bedtime-particularly large meals and chocolate (which contains caffeine).  And try not to make any significant change to your diet.  For example, if you are struggling with sleep problem, is not a good time to start experimenting with spicy dishes. A light snack prior to bed may help as well.  -Soak up some natural light.  This is particularly important for older people who may not venture outside as frequently as children in younger adults.  Light exposure helps maintain a healthy sleep-wake cycle.  -Establish a regular bedtime routine.  Try to avoid emotionally upsetting conversations and activities before going to sleep. Stick to a routine sleep and wake up time (within an hour) regardless of weekday.  -Avoid clock watching. Set your alarm if needed and avoid looking at the clock at night if you awake early which can cause excessive worry about lack of sleep.  -Get out of bed. If you have not fallen asleep in approximately 20-30 minutes get out of bed and do something 'boring' until you feel tired then return to bed.  -Associated bed with sleep.  Is not a good idea to watch television, use her computer or phone, listen to the radio, or read while in bed.  -Ensure a pleasant, relaxing sleep environment.  The bed should be comfortable, in the room should  not be too hot, cold, or bright.

## 2023-10-25 NOTE — PROGRESS NOTES
"Subjective:     Chief Complaint   Patient presents with    Results     HPI:   Katy presents today to discuss results.    Patient reports that her palpitations have improved and now occurring only once to twice a day without always associated lightheadedness or dizziness.  Denies any chest pain or shortness of breath.  Has been overall trying to hydrate well and avoid caffeinated beverages but recently with visitors in town believes she had some coffee with caffeine.    Feeling tired, but has been doing a lot of driving and having company.  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  Typically urinates once at night.  Has been using a sleep mask which helps.  Denies any symptoms suggestive of sleep apnea without paroxysmal nocturnal dyspnea.    States that she has a long history of left knee arthritis and wondering about having her knee replaced.  States that previously steroid injections were not helpful.  Overall just bears through the pain but does have Voltaren gel at home.    Review of Systems   Constitutional:  Positive for malaise/fatigue. Negative for chills, fever and weight loss.   Respiratory:  Negative for cough and shortness of breath.    Cardiovascular:  Positive for palpitations. Negative for chest pain.   Gastrointestinal:  Negative for abdominal pain, diarrhea, nausea and vomiting.   Musculoskeletal:  Positive for joint pain. Negative for falls.   Neurological:  Positive for dizziness.   Psychiatric/Behavioral:  The patient has insomnia.      Objective:     Exam:  /80   Pulse (!) 59   Temp 36.7 °C (98 °F) (Temporal)   Resp 20   Ht 1.676 m (5' 6\")   Wt 82.6 kg (182 lb 1.6 oz)   SpO2 95%   BMI 29.39 kg/m²  Body mass index is 29.39 kg/m².    Physical Exam  Vitals reviewed.   Constitutional:       General: She is not in acute distress.     Appearance: " Normal appearance. She is not ill-appearing.   HENT:      Head: Normocephalic and atraumatic.      Nose: Nose normal.      Mouth/Throat:      Mouth: Mucous membranes are moist.   Eyes:      Conjunctiva/sclera: Conjunctivae normal.   Cardiovascular:      Rate and Rhythm: Normal rate and regular rhythm.      Heart sounds: Normal heart sounds. No murmur heard.  Pulmonary:      Effort: Pulmonary effort is normal. No respiratory distress.      Breath sounds: Normal breath sounds. No stridor. No wheezing, rhonchi or rales.   Musculoskeletal:      Cervical back: Normal range of motion and neck supple. No rigidity or tenderness.      Right knee: No swelling, effusion or bony tenderness. Normal range of motion. No tenderness.      Right lower leg: No edema.      Left lower leg: No edema.   Neurological:      General: No focal deficit present.      Mental Status: She is alert and oriented to person, place, and time.   Psychiatric:         Mood and Affect: Mood normal.         Behavior: Behavior normal.         Thought Content: Thought content normal.       Labs: Reviewed from 8/29/2023  Imaging: Reviewed from echo 10/11/2023  Zio patch 3 day monitor 9/26/2023  Impressions:   Symptomatic 3-day monitor.  Symptoms correspond to sinus rhythm   No significant arrhythmia.    Assessment & Plan:     78 y.o. female with the following -     1. Palpitations  This is chronic and improved from prior.  Recent lab work end of August without contributing findings, 3-day monitor normal and echocardiogram without concerning findings.  Discussed preventative measures to avoid palpitations/lightheadedness such as ensuring adequate hydration and avoiding caffeine.  Follow-up if change in baseline.    2. Stage 3a chronic kidney disease (HCC)  Chronic condition, stable. BP well controlled, continue losartan as below. Avoid nephrotoxic medications. Trend Cr 6m from last.  - Basic Metabolic Panel; Future    losartan (COZAAR) 50 MG Tab, Take 1 tablet  by mouth every day., Disp: 100 Tablet, Rfl: 3    3. Primary osteoarthritis of left knee  This is a chronic condition, patient requesting referral to orthopedics for consideration of total knee arthroscopy.  Can continue over-the-counter topicals such as Voltaren gel or other in addition to acetaminophen as needed.  - Referral to Orthopedics    Return in about 4 months (around 3/1/2024), or if symptoms worsen or fail to improve, for labs.    Please note that this dictation was created using voice recognition software. I have made every reasonable attempt to correct obvious errors, but I expect that there are errors of grammar and possibly content that I did not discover before finalizing the note.

## 2023-11-05 DIAGNOSIS — I10 ESSENTIAL HYPERTENSION: ICD-10-CM

## 2023-11-06 PROCEDURE — RXMED WILLOW AMBULATORY MEDICATION CHARGE: Performed by: STUDENT IN AN ORGANIZED HEALTH CARE EDUCATION/TRAINING PROGRAM

## 2023-11-06 RX ORDER — LOSARTAN POTASSIUM 50 MG/1
50 TABLET ORAL DAILY
Qty: 100 TABLET | Refills: 3 | Status: SHIPPED | OUTPATIENT
Start: 2023-11-06

## 2023-11-07 ENCOUNTER — PHARMACY VISIT (OUTPATIENT)
Dept: PHARMACY | Facility: MEDICAL CENTER | Age: 78
End: 2023-11-07
Payer: COMMERCIAL

## 2023-12-11 ENCOUNTER — HOSPITAL ENCOUNTER (EMERGENCY)
Facility: MEDICAL CENTER | Age: 78
End: 2023-12-11
Attending: EMERGENCY MEDICINE
Payer: MEDICARE

## 2023-12-11 ENCOUNTER — PATIENT MESSAGE (OUTPATIENT)
Dept: MEDICAL GROUP | Facility: LAB | Age: 78
End: 2023-12-11
Payer: MEDICARE

## 2023-12-11 VITALS
SYSTOLIC BLOOD PRESSURE: 149 MMHG | DIASTOLIC BLOOD PRESSURE: 78 MMHG | TEMPERATURE: 97.3 F | HEART RATE: 55 BPM | BODY MASS INDEX: 27.62 KG/M2 | RESPIRATION RATE: 15 BRPM | HEIGHT: 67 IN | WEIGHT: 176 LBS | OXYGEN SATURATION: 97 %

## 2023-12-11 DIAGNOSIS — I10 ESSENTIAL HYPERTENSION: ICD-10-CM

## 2023-12-11 DIAGNOSIS — R00.2 PALPITATIONS: ICD-10-CM

## 2023-12-11 DIAGNOSIS — R42 DIZZINESS: ICD-10-CM

## 2023-12-11 LAB
ALBUMIN SERPL BCP-MCNC: 3.9 G/DL (ref 3.2–4.9)
ALBUMIN/GLOB SERPL: 1.9 G/DL
ALP SERPL-CCNC: 91 U/L (ref 30–99)
ALT SERPL-CCNC: 18 U/L (ref 2–50)
ANION GAP SERPL CALC-SCNC: 11 MMOL/L (ref 7–16)
AST SERPL-CCNC: 19 U/L (ref 12–45)
BASOPHILS # BLD AUTO: 0.5 % (ref 0–1.8)
BASOPHILS # BLD: 0.02 K/UL (ref 0–0.12)
BILIRUB SERPL-MCNC: 0.2 MG/DL (ref 0.1–1.5)
BUN SERPL-MCNC: 19 MG/DL (ref 8–22)
CALCIUM ALBUM COR SERPL-MCNC: 9.1 MG/DL (ref 8.5–10.5)
CALCIUM SERPL-MCNC: 9 MG/DL (ref 8.5–10.5)
CHLORIDE SERPL-SCNC: 104 MMOL/L (ref 96–112)
CK SERPL-CCNC: 24 U/L (ref 0–154)
CO2 SERPL-SCNC: 24 MMOL/L (ref 20–33)
CREAT SERPL-MCNC: 0.87 MG/DL (ref 0.5–1.4)
EKG IMPRESSION: NORMAL
EOSINOPHIL # BLD AUTO: 0.05 K/UL (ref 0–0.51)
EOSINOPHIL NFR BLD: 1.3 % (ref 0–6.9)
ERYTHROCYTE [DISTWIDTH] IN BLOOD BY AUTOMATED COUNT: 43.5 FL (ref 35.9–50)
GFR SERPLBLD CREATININE-BSD FMLA CKD-EPI: 69 ML/MIN/1.73 M 2
GLOBULIN SER CALC-MCNC: 2.1 G/DL (ref 1.9–3.5)
GLUCOSE SERPL-MCNC: 105 MG/DL (ref 65–99)
HCT VFR BLD AUTO: 40.6 % (ref 37–47)
HGB BLD-MCNC: 13.8 G/DL (ref 12–16)
IMM GRANULOCYTES # BLD AUTO: 0.01 K/UL (ref 0–0.11)
IMM GRANULOCYTES NFR BLD AUTO: 0.3 % (ref 0–0.9)
LYMPHOCYTES # BLD AUTO: 1.09 K/UL (ref 1–4.8)
LYMPHOCYTES NFR BLD: 27.6 % (ref 22–41)
MCH RBC QN AUTO: 30.9 PG (ref 27–33)
MCHC RBC AUTO-ENTMCNC: 34 G/DL (ref 32.2–35.5)
MCV RBC AUTO: 90.8 FL (ref 81.4–97.8)
MONOCYTES # BLD AUTO: 0.47 K/UL (ref 0–0.85)
MONOCYTES NFR BLD AUTO: 11.9 % (ref 0–13.4)
NEUTROPHILS # BLD AUTO: 2.31 K/UL (ref 1.82–7.42)
NEUTROPHILS NFR BLD: 58.4 % (ref 44–72)
NRBC # BLD AUTO: 0 K/UL
NRBC BLD-RTO: 0 /100 WBC (ref 0–0.2)
PLATELET # BLD AUTO: 253 K/UL (ref 164–446)
PMV BLD AUTO: 10.1 FL (ref 9–12.9)
POTASSIUM SERPL-SCNC: 4.1 MMOL/L (ref 3.6–5.5)
PROT SERPL-MCNC: 6 G/DL (ref 6–8.2)
RBC # BLD AUTO: 4.47 M/UL (ref 4.2–5.4)
SODIUM SERPL-SCNC: 139 MMOL/L (ref 135–145)
TROPONIN T SERPL-MCNC: 10 NG/L (ref 6–19)
WBC # BLD AUTO: 4 K/UL (ref 4.8–10.8)

## 2023-12-11 PROCEDURE — 36415 COLL VENOUS BLD VENIPUNCTURE: CPT

## 2023-12-11 PROCEDURE — 93005 ELECTROCARDIOGRAM TRACING: CPT

## 2023-12-11 PROCEDURE — 80053 COMPREHEN METABOLIC PANEL: CPT

## 2023-12-11 PROCEDURE — 85025 COMPLETE CBC W/AUTO DIFF WBC: CPT

## 2023-12-11 PROCEDURE — 99284 EMERGENCY DEPT VISIT MOD MDM: CPT

## 2023-12-11 PROCEDURE — 82550 ASSAY OF CK (CPK): CPT

## 2023-12-11 PROCEDURE — 84484 ASSAY OF TROPONIN QUANT: CPT

## 2023-12-11 PROCEDURE — 93005 ELECTROCARDIOGRAM TRACING: CPT | Performed by: EMERGENCY MEDICINE

## 2023-12-11 ASSESSMENT — FIBROSIS 4 INDEX: FIB4 SCORE: 1.28

## 2023-12-11 NOTE — ED TRIAGE NOTES
.  Chief Complaint   Patient presents with    Dizziness     Saturday after feeding animals. Home bp 85/64    Headache     Onset friday    Generalized Body Aches     Onset Friday lasted 24 hours     Pt to triage with s/o. Took home covid test negative. Pt reports continues to have dizziness intermittently. EKG complete on arrival.

## 2023-12-12 NOTE — ED PROVIDER NOTES
"ED Provider Note    CHIEF COMPLAINT  Chief Complaint   Patient presents with    Dizziness     Saturday after feeding animals. Home bp 85/64    Headache     Onset friday    Generalized Body Aches     Onset Friday lasted 24 hours       EXTERNAL RECORDS REVIEWED  Outpatient Notes   and Outpatient labs & studies patient follows with her primary doctor for episodes of palpitations.  Echocardiogram showed normal function.  Recent TSH which was normal.    HPI/ROS  LIMITATION TO HISTORY   Select: : None      Katy Perez is a 78 y.o. female who presents complaining of dizziness and feeling fatigued.  The patient has had a long history of intermittent dizziness, and a thumping in her chest.  She had a Holter monitor which apparently was unrevealing.  This past Thursday she went to "[employer] that she typically does.  Did not think too much of it.  However Friday she woke with soreness all over, headache, feeling generally terrible.  She took some Tylenol and basic just went to bed for the day.  Saturday she was little bit better but feeling fatigued.  She went about her activities doing her chores, and just felt really tired.  She took her blood pressure that time was in the 80s.  Today she is with her  who has an appointment today here in [facility].  She thought she should go ahead and get checked out because she had a \"brain fog\" and again just feels very fatigued.  She cannot really put a finger on it.  There is no focal weakness or numbness.  No chest pain or shortness of breath.  She has her watch which monitors her heart rate, and apparently this is not giving her any very high or very low rates.  While here in the ER she feels better than she has all day.    PAST MEDICAL HISTORY   has a past medical history of Arthritis (5/16/2012), Blood clotting disorder (HCC), Hypertension, Hypocalcemia (12/7/2021), MEDICAL HOME (11/8/12), Overweight with body mass index (BMI) of 29 to 29.9 in adult (4/18/2022), Pain, and " "Renal disorder.    SURGICAL HISTORY   has a past surgical history that includes breast augmentation with implant; gyn surgery (1977); bunionectomy drew (Right, 1/10/2017); hammertoe correction (Right, 1/10/2017); other (1970); knee arthroplasty total (11/5/2013); other orthopedic surgery (2017); and explore parathyroid glands (Right, 11/17/2021).    FAMILY HISTORY  Family History   Problem Relation Age of Onset    Hypertension Father     Heart Attack Father     Heart Failure Mother     Breast Cancer Maternal Grandmother     No Known Problems Sister     Heart Attack Maternal Grandfather     No Known Problems Paternal Grandmother     No Known Problems Paternal Grandfather        SOCIAL HISTORY  Social History     Tobacco Use    Smoking status: Never    Smokeless tobacco: Never   Vaping Use    Vaping Use: Never used   Substance and Sexual Activity    Alcohol use: Yes     Alcohol/week: 4.2 - 6.0 oz     Types: 7 - 10 Glasses of wine per week    Drug use: Yes     Frequency: 1.0 times per week     Types: Marijuana, Oral     Comment: smokes marijuana couple times a week; CBD oil  a couple times a week    Sexual activity: Not Currently     Partners: Male       CURRENT MEDICATIONS  Home Medications       Reviewed by Kala Herman R.N. (Registered Nurse) on 12/11/23 at 1430  Med List Status: Partial     Medication Last Dose Status   Calcium Carbonate Antacid (TUMS ULTRA 1000) 1000 MG Chew Tab  Active   losartan (COZAAR) 50 MG Tab 12/10/2023 Active   VITAMIN D PO  Active                    ALLERGIES  Allergies   Allergen Reactions    Pcn [Penicillins] Rash    Statins [Hmg-Coa-R Inhibitors]        PHYSICAL EXAM  VITAL SIGNS: /73   Pulse 77   Temp 35.8 °C (96.5 °F) (Temporal)   Resp 16   Ht 1.689 m (5' 6.5\")   Wt 79.8 kg (176 lb)   SpO2 100%   BMI 27.98 kg/m²    Constitutional: Well appearing patient in no acute distress.  HENT: Head is without trauma.  Oropharynx is clear.  Mucous membranes are moist.  Eyes: " Sclerae are nonicteric, pupils are equally round.  Neck: Supple with grossly normal range of motion.  Cardiovascular: Heart is regular rate and rhythm without murmur rub or gallop.  Peripheral pulses are intact and symmetric throughout.  Thorax & Lungs: Breathing easily.  Good air movement.  There is no wheeze, rhonchi or rales.  Abdomen: Bowel sounds normal, soft, non-distended, nontender, no mass nor pulsatile mass. I do not appreciate hepatosplenomegaly.  Skin: No apparent rash.  I do not see petechiae or purpura.  Extremities: No evidence of acute trauma.  No clubbing, cyanosis, edema, no Homans or cords.  Neurologic: Alert. Moving all extremities.  Intact sensation and strength throughout.  Gait is normal.  Psychiatric: Normal for situation      DIAGNOSTIC STUDIES / PROCEDURES  EKG  I have independently interpreted this EKG  My interpretation this is a twelve-lead study showing normal sinus rhythm at a rate of 62.  There are no ST segment or T wave changes.  Impression: Unremarkable EKG.  Telemetry review  There has been no ectopy while here in the ER  LABS  Labs Reviewed   CBC WITH DIFFERENTIAL - Abnormal; Notable for the following components:       Result Value    WBC 4.0 (*)     All other components within normal limits    Narrative:     Biotin intake of greater than 5 mg per day may interfere with  troponin levels, causing false low values.   COMP METABOLIC PANEL - Abnormal; Notable for the following components:    Glucose 105 (*)     All other components within normal limits    Narrative:     Biotin intake of greater than 5 mg per day may interfere with  troponin levels, causing false low values.   TROPONIN    Narrative:     Biotin intake of greater than 5 mg per day may interfere with  troponin levels, causing false low values.   CREATINE KINASE    Narrative:     Biotin intake of greater than 5 mg per day may interfere with  troponin levels, causing false low values.   ESTIMATED GFR    Narrative:      Biotin intake of greater than 5 mg per day may interfere with  troponin levels, causing false low values.             COURSE & MEDICAL DECISION MAKING    ED Observation Status? Yes; I am placing the patient in to an observation status due to a diagnostic uncertainty as well as therapeutic intensity. Patient placed in observation status at 4:00 PM, 12/11/2023.     Observation plan is as follows: Keep her on telemetry.  Will follow her vital signs.    Upon Reevaluation, the patient's condition has: Stable; and will be discharged.    Patient discharged from ED Observation status at 1805 (Time) 12/11/2023  (Date).     INITIAL ASSESSMENT, COURSE AND PLAN  Care Narrative: Patient presents with some intermittent dizziness and palpitations.  This has been ongoing, her workup was been negative thus far.  Last few days she has had some fatigue.  Not sure what to make of this.  Will go ahead and work her up.    Her EKG is unrevealing.    Laboratories returned showing a mild leukopenia, however no leukocytosis.  Laboratories are unrevealing to include electrolytes.  Troponin is negative with days of symptoms.  I did check a CK level, this is also normal.  On telemetry, she has had no ectopy.  Course, she has had no symptoms either.    As I discussed with her and her , at this point I am not sure is the cause of her symptoms but I feel that is safe to discharge her home.  I put an order for cardiology follow-up.  It does look like her doctor ordered a Zio patch 3 months ago, however has not been done.    Instructions on dizziness and palpitations.  \    DISPOSITION AND DISCUSSIONS      Escalation of care considered, and ultimately not performed:acute inpatient care management, however at this time, the patient is most appropriate for outpatient management      FINAL DIAGNOSIS  1. Dizziness    2. Palpitations           Electronically signed by: Alex Sorto M.D., 12/11/2023 4:00 PM

## 2023-12-18 PROBLEM — M17.12 DEGENERATIVE ARTHRITIS OF LEFT KNEE: Status: ACTIVE | Noted: 2023-12-18

## 2024-01-03 ENCOUNTER — TELEPHONE (OUTPATIENT)
Dept: HEALTH INFORMATION MANAGEMENT | Facility: OTHER | Age: 79
End: 2024-01-03
Payer: MEDICARE

## 2024-01-16 ENCOUNTER — TELEPHONE (OUTPATIENT)
Dept: MEDICAL GROUP | Facility: LAB | Age: 79
End: 2024-01-16
Payer: MEDICARE

## 2024-01-16 NOTE — TELEPHONE ENCOUNTER
Phone Number Called: [phone] (home)      Call outcome: Did not leave a detailed message. Requested patient to call back.    Message: Advised patient to give us a call back regarding a clearance that needs to be done.

## 2024-01-18 ENCOUNTER — OFFICE VISIT (OUTPATIENT)
Dept: MEDICAL GROUP | Facility: LAB | Age: 79
End: 2024-01-18
Payer: MEDICARE

## 2024-01-18 ENCOUNTER — HOSPITAL ENCOUNTER (OUTPATIENT)
Dept: LAB | Facility: MEDICAL CENTER | Age: 79
End: 2024-01-18
Attending: STUDENT IN AN ORGANIZED HEALTH CARE EDUCATION/TRAINING PROGRAM
Payer: MEDICARE

## 2024-01-18 VITALS
SYSTOLIC BLOOD PRESSURE: 112 MMHG | HEART RATE: 64 BPM | BODY MASS INDEX: 28.28 KG/M2 | WEIGHT: 180.2 LBS | DIASTOLIC BLOOD PRESSURE: 50 MMHG | RESPIRATION RATE: 16 BRPM | HEIGHT: 67 IN | TEMPERATURE: 97 F

## 2024-01-18 DIAGNOSIS — Z01.810 PREOP CARDIOVASCULAR EXAM: ICD-10-CM

## 2024-01-18 DIAGNOSIS — I10 ESSENTIAL HYPERTENSION: ICD-10-CM

## 2024-01-18 DIAGNOSIS — D72.819 LEUKOPENIA, UNSPECIFIED TYPE: ICD-10-CM

## 2024-01-18 DIAGNOSIS — Z86.711 HISTORY OF PULMONARY EMBOLISM: ICD-10-CM

## 2024-01-18 DIAGNOSIS — N18.31 STAGE 3A CHRONIC KIDNEY DISEASE: ICD-10-CM

## 2024-01-18 DIAGNOSIS — I70.0 AORTIC ATHEROSCLEROSIS (HCC): ICD-10-CM

## 2024-01-18 PROBLEM — N18.2 STAGE 2 CHRONIC KIDNEY DISEASE: Status: ACTIVE | Noted: 2024-01-18

## 2024-01-18 LAB
BASOPHILS # BLD AUTO: 0.9 % (ref 0–1.8)
BASOPHILS # BLD: 0.07 K/UL (ref 0–0.12)
EOSINOPHIL # BLD AUTO: 0.15 K/UL (ref 0–0.51)
EOSINOPHIL NFR BLD: 2 % (ref 0–6.9)
ERYTHROCYTE [DISTWIDTH] IN BLOOD BY AUTOMATED COUNT: 45.1 FL (ref 35.9–50)
HCT VFR BLD AUTO: 42.2 % (ref 37–47)
HGB BLD-MCNC: 13.8 G/DL (ref 12–16)
IMM GRANULOCYTES # BLD AUTO: 0.02 K/UL (ref 0–0.11)
IMM GRANULOCYTES NFR BLD AUTO: 0.3 % (ref 0–0.9)
LYMPHOCYTES # BLD AUTO: 1.82 K/UL (ref 1–4.8)
LYMPHOCYTES NFR BLD: 24.6 % (ref 22–41)
MCH RBC QN AUTO: 30.5 PG (ref 27–33)
MCHC RBC AUTO-ENTMCNC: 32.7 G/DL (ref 32.2–35.5)
MCV RBC AUTO: 93.4 FL (ref 81.4–97.8)
MONOCYTES # BLD AUTO: 0.56 K/UL (ref 0–0.85)
MONOCYTES NFR BLD AUTO: 7.6 % (ref 0–13.4)
NEUTROPHILS # BLD AUTO: 4.78 K/UL (ref 1.82–7.42)
NEUTROPHILS NFR BLD: 64.6 % (ref 44–72)
NRBC # BLD AUTO: 0 K/UL
NRBC BLD-RTO: 0 /100 WBC (ref 0–0.2)
PLATELET # BLD AUTO: 296 K/UL (ref 164–446)
PMV BLD AUTO: 10.7 FL (ref 9–12.9)
RBC # BLD AUTO: 4.52 M/UL (ref 4.2–5.4)
WBC # BLD AUTO: 7.4 K/UL (ref 4.8–10.8)

## 2024-01-18 PROCEDURE — 80048 BASIC METABOLIC PNL TOTAL CA: CPT

## 2024-01-18 PROCEDURE — 85025 COMPLETE CBC W/AUTO DIFF WBC: CPT

## 2024-01-18 PROCEDURE — 99214 OFFICE O/P EST MOD 30 MIN: CPT | Performed by: STUDENT IN AN ORGANIZED HEALTH CARE EDUCATION/TRAINING PROGRAM

## 2024-01-18 PROCEDURE — 3074F SYST BP LT 130 MM HG: CPT | Performed by: STUDENT IN AN ORGANIZED HEALTH CARE EDUCATION/TRAINING PROGRAM

## 2024-01-18 PROCEDURE — 83735 ASSAY OF MAGNESIUM: CPT

## 2024-01-18 PROCEDURE — 3078F DIAST BP <80 MM HG: CPT | Performed by: STUDENT IN AN ORGANIZED HEALTH CARE EDUCATION/TRAINING PROGRAM

## 2024-01-18 PROCEDURE — 36415 COLL VENOUS BLD VENIPUNCTURE: CPT

## 2024-01-18 ASSESSMENT — ENCOUNTER SYMPTOMS
WEIGHT LOSS: 0
SORE THROAT: 0
ABDOMINAL PAIN: 0
DIARRHEA: 0
NAUSEA: 0
ORTHOPNEA: 0
FEVER: 0
CLAUDICATION: 0
CHILLS: 0
INSOMNIA: 0
PALPITATIONS: 0
VOMITING: 0
PND: 0
MYALGIAS: 0
HEADACHES: 0
SHORTNESS OF BREATH: 0
COUGH: 0

## 2024-01-18 ASSESSMENT — FIBROSIS 4 INDEX: FIB4 SCORE: 1.35

## 2024-01-18 ASSESSMENT — PATIENT HEALTH QUESTIONNAIRE - PHQ9: CLINICAL INTERPRETATION OF PHQ2 SCORE: 0

## 2024-01-18 NOTE — PROGRESS NOTES
Subjective:     Chief Complaint   Patient presents with    Letter for School/Work     Clearance letter for joint replacement / knee surgery      HPI:   Katy presents today for cardiac restratification prior to surgery.    Patient having a left total knee arthroscopy and is here to discuss clearance letter.    Patient has a history of postoperative DVT on the left lower extremity with resultant pulmonary embolism after bunion surgery in 2017. IVC filter placed and has since been removed.  Patient reports that she and her orthopedist had discussed Xarelto for postoperative prophylaxis.      Patient's last surgery was approximately 2 years ago for parathyroidectomy.  She had no intraoperative or postoperative complications associated with that surgery.  Has never had complications from anesthesia.    Patient did have a visit to the emergency department 12/2023 and she suspects her symptoms were related to a viral illness.  States that she has been adding salt to food and has had complete resolution of palpitations.  Reports very infrequent rare lightheadedness without syncope.  States that she can work on her property for up to an hour without resting caring for her horses (ex walking carrying buckets and shoveling maneuver) without chest pain, lightheadedness or shortness of breath.    Review of Systems   Constitutional:  Negative for chills, fever, malaise/fatigue and weight loss.   HENT:  Negative for congestion and sore throat.    Respiratory:  Negative for cough and shortness of breath.    Cardiovascular:  Negative for chest pain, palpitations, orthopnea, claudication, leg swelling and PND.   Gastrointestinal:  Negative for abdominal pain, diarrhea, nausea and vomiting.   Musculoskeletal:  Positive for joint pain. Negative for myalgias.   Skin:  Negative for rash.   Neurological:  Negative for headaches.   Psychiatric/Behavioral:  The patient does not have insomnia.      Objective:     Exam:  /50 (BP  "Location: Left arm, Patient Position: Sitting, BP Cuff Size: Adult)   Pulse 64   Temp 36.1 °C (97 °F) (Temporal)   Resp 16   Ht 1.689 m (5' 6.5\")   Wt 81.7 kg (180 lb 3.2 oz)   BMI 28.65 kg/m²  Body mass index is 28.65 kg/m².    Physical Exam  Vitals reviewed.   Constitutional:       General: She is not in acute distress.     Appearance: Normal appearance. She is not ill-appearing.   HENT:      Head: Normocephalic and atraumatic.      Nose: Nose normal.      Mouth/Throat:      Mouth: Mucous membranes are moist.   Eyes:      Conjunctiva/sclera: Conjunctivae normal.   Cardiovascular:      Rate and Rhythm: Normal rate and regular rhythm.      Heart sounds: Normal heart sounds. No murmur heard.  Pulmonary:      Effort: Pulmonary effort is normal. No respiratory distress.      Breath sounds: Normal breath sounds. No stridor. No wheezing, rhonchi or rales.   Musculoskeletal:      Cervical back: Normal range of motion and neck supple. No rigidity or tenderness.      Right lower leg: No tenderness. No edema.      Left lower leg: No tenderness. No edema.   Skin:     General: Skin is warm and dry.   Neurological:      General: No focal deficit present.      Mental Status: She is alert and oriented to person, place, and time.   Psychiatric:         Mood and Affect: Mood normal.         Behavior: Behavior normal.         Thought Content: Thought content normal.     Labs: Reviewed from 8/29/2023, 12/11/2023  Imaging: Reviewed from EKG 9/7/2023, ZIO 3-day monitor 9/25/2023, echo 10/11/2023, EKG 12/11/2023    Assessment & Plan:     78 y.o. female with the following -     1. Preop cardiovascular exam  2. History of pulmonary embolism  Per the 2014 ACC/AHA perioperative guidelines for non-cardiac surgery, this patient is medically stable and at average risk for surgery. She has no active cardiac conditions and good functional capacity exceeding 4 mets, and is undergoing an elevated (intermediate) risk procedure.  She may " proceed with surgery with the following recommendations.    - Hold losartan the morning of surgery  -Patient high risk for VTE given history, recommend postoperative anticoagulation for prophylaxis (renally dosed if necessary)  - Because of age, care with CNS meds post-operatively to minimize risk of delirium    3. Leukopenia, unspecified type  Acute, mild without neutropenia or other cell line abnormalities.  Agree with patient that this was likely related to suspected viral infection which symptoms have resolved.  Trend.  - CBC WITH DIFFERENTIAL; Future    I spent a total of 32 minutes with record review, exam, communication with the patient, and documentation of this encounter.    Return if symptoms worsen or fail to improve.    Please note that this dictation was created using voice recognition software. I have made every reasonable attempt to correct obvious errors, but I expect that there are errors of grammar and possibly content that I did not discover before finalizing the note.    HCC Gap Form    Diagnosis to address: I70.0 - Aortic atherosclerosis (HCC)  Assessment and plan: Chronic, stable. Continue with current defined treatment plan: monitor labs, heart healthy diet and exercise as tolerated. Patient has declined statin with minimal LDL elevation. Follow-up at least annually.  Last edited 01/18/24 15:08 PST by Dori Mason D.O.

## 2024-01-19 LAB
ANION GAP SERPL CALC-SCNC: 12 MMOL/L (ref 7–16)
BUN SERPL-MCNC: 13 MG/DL (ref 8–22)
CALCIUM SERPL-MCNC: 8.7 MG/DL (ref 8.5–10.5)
CHLORIDE SERPL-SCNC: 107 MMOL/L (ref 96–112)
CO2 SERPL-SCNC: 22 MMOL/L (ref 20–33)
CREAT SERPL-MCNC: 0.94 MG/DL (ref 0.5–1.4)
GFR SERPLBLD CREATININE-BSD FMLA CKD-EPI: 63 ML/MIN/1.73 M 2
GLUCOSE SERPL-MCNC: 89 MG/DL (ref 65–99)
MAGNESIUM SERPL-MCNC: 1.9 MG/DL (ref 1.5–2.5)
POTASSIUM SERPL-SCNC: 4.3 MMOL/L (ref 3.6–5.5)
SODIUM SERPL-SCNC: 141 MMOL/L (ref 135–145)

## 2024-01-26 ENCOUNTER — OFFICE VISIT (OUTPATIENT)
Dept: URGENT CARE | Facility: PHYSICIAN GROUP | Age: 79
End: 2024-01-26
Payer: MEDICARE

## 2024-01-26 VITALS
WEIGHT: 181 LBS | OXYGEN SATURATION: 96 % | BODY MASS INDEX: 29.09 KG/M2 | HEIGHT: 66 IN | HEART RATE: 64 BPM | SYSTOLIC BLOOD PRESSURE: 118 MMHG | DIASTOLIC BLOOD PRESSURE: 76 MMHG | RESPIRATION RATE: 18 BRPM | TEMPERATURE: 98.2 F

## 2024-01-26 DIAGNOSIS — S81.812A LEG LACERATION, LEFT, INITIAL ENCOUNTER: ICD-10-CM

## 2024-01-26 PROCEDURE — 12002 RPR S/N/AX/GEN/TRNK2.6-7.5CM: CPT | Mod: LT | Performed by: PHYSICIAN ASSISTANT

## 2024-01-26 PROCEDURE — 3078F DIAST BP <80 MM HG: CPT | Performed by: PHYSICIAN ASSISTANT

## 2024-01-26 PROCEDURE — 3074F SYST BP LT 130 MM HG: CPT | Performed by: PHYSICIAN ASSISTANT

## 2024-01-26 RX ORDER — CEPHALEXIN 500 MG/1
500 CAPSULE ORAL 4 TIMES DAILY
Qty: 8 CAPSULE | Refills: 0 | Status: SHIPPED | OUTPATIENT
Start: 2024-01-26 | End: 2024-01-28

## 2024-01-26 ASSESSMENT — FIBROSIS 4 INDEX: FIB4 SCORE: 1.15

## 2024-01-26 ASSESSMENT — ENCOUNTER SYMPTOMS
MYALGIAS: 0
HEADACHES: 0
DIARRHEA: 0
EYE PAIN: 0
CONSTIPATION: 0
CHILLS: 0
SORE THROAT: 0
FEVER: 0
SHORTNESS OF BREATH: 0
NAUSEA: 0
ABDOMINAL PAIN: 0
VOMITING: 0
COUGH: 0

## 2024-01-27 NOTE — PROGRESS NOTES
"Subjective:   Katy Perez is a 78 y.o. female who presents for Leg Pain (Pt states cut herself with wood pallets, Front of left shin x 1 day )      78-year-old female sustained laceration to left anterior shin around 1 hour prior to arrival when her leg slipped between 2 wooden pallets and scraped the front of her shin.  Tetanus updated within the last 5 years    Review of Systems   Constitutional:  Negative for chills and fever.   HENT:  Negative for congestion, ear pain and sore throat.    Eyes:  Negative for pain.   Respiratory:  Negative for cough and shortness of breath.    Cardiovascular:  Negative for chest pain.   Gastrointestinal:  Negative for abdominal pain, constipation, diarrhea, nausea and vomiting.   Genitourinary:  Negative for dysuria.   Musculoskeletal:  Negative for myalgias.   Skin:  Negative for rash.   Neurological:  Negative for headaches.       Medications, Allergies, and current problem list reviewed today in Epic.     Objective:     /76 (BP Location: Right arm, Patient Position: Sitting, BP Cuff Size: Adult)   Pulse 64   Temp 36.8 °C (98.2 °F) (Temporal)   Resp 18   Ht 1.676 m (5' 6\")   Wt 82.1 kg (181 lb)   SpO2 96%     Physical Exam  Vitals reviewed.   Constitutional:       Appearance: Normal appearance.   HENT:      Head: Normocephalic and atraumatic.      Right Ear: External ear normal.      Left Ear: External ear normal.      Nose: Nose normal.      Mouth/Throat:      Mouth: Mucous membranes are moist.   Eyes:      Conjunctiva/sclera: Conjunctivae normal.   Cardiovascular:      Rate and Rhythm: Normal rate.   Pulmonary:      Effort: Pulmonary effort is normal.   Skin:     General: Skin is warm and dry.      Capillary Refill: Capillary refill takes less than 2 seconds.      Comments: 5 cm curvilinear jagged laceration left anterior shin full-thickness   Neurological:      Mental Status: She is alert and oriented to person, place, and time. "         Assessment/Plan:     Diagnosis and associated orders:     1. Leg laceration, left, initial encounter  cephALEXin (KEFLEX) 500 MG Cap         Comments/MDM:     6 simple interrupted sutures placed, small area in the middle where it was jagged I loosely approximated to allow for any potential drainage as is and worried about potential contamination.  Return in 10 days for suture removal.  Tetanus is up-to-date.  Will provide some prophylaxis with Keflex 4 times daily for 2 days however she notes any signs of infection I encouraged her to return so we can do dual antibiotic therapy         Differential diagnosis, natural history, supportive care, and indications for immediate follow-up discussed.    Advised the patient to follow-up with the primary care physician for recheck, reevaluation, and consideration of further management.    Please note that this dictation was created using voice recognition software. I have made a reasonable attempt to correct obvious errors, but I expect that there are errors of grammar and possibly content that I did not discover before finalizing the note.    This note was electronically signed by Alex Reed PA-C

## 2024-01-27 NOTE — PROCEDURES
Laceration Repair    Date/Time: 1/26/2024 6:48 PM    Performed by: Alex Reed P.A.-C.  Authorized by: Alex Reed P.A.-C.  Body area: lower extremity  Location details: left lower leg  Laceration length: 5 cm  Contamination: The wound is contaminated.  Foreign bodies: no foreign bodies  Tendon involvement: none  Nerve involvement: none  Vascular damage: no  Anesthesia: local infiltration    Anesthesia:  Local Anesthetic: lidocaine 1% without epinephrine  Anesthetic total: 6 mL    Sedation:  Patient sedated: no    Preparation: Patient was prepped and draped in the usual sterile fashion.  Irrigation solution: Dilute chlorhexidine.  Irrigation method: syringe  Amount of cleaning: extensive  Debridement: none  Degree of undermining: none  Skin closure: 4-0 Prolene  Number of sutures: 6  Technique: simple  Approximation: close  Approximation difficulty: simple  Dressing: pressure dressing  Patient tolerance: patient tolerated the procedure well with no immediate complications

## 2024-02-06 ENCOUNTER — OFFICE VISIT (OUTPATIENT)
Dept: MEDICAL GROUP | Facility: LAB | Age: 79
End: 2024-02-06
Payer: MEDICARE

## 2024-02-06 VITALS
OXYGEN SATURATION: 97 % | WEIGHT: 179.3 LBS | RESPIRATION RATE: 16 BRPM | SYSTOLIC BLOOD PRESSURE: 120 MMHG | HEART RATE: 61 BPM | TEMPERATURE: 97.3 F | DIASTOLIC BLOOD PRESSURE: 58 MMHG | BODY MASS INDEX: 28.82 KG/M2 | HEIGHT: 66 IN

## 2024-02-06 DIAGNOSIS — Z48.02 VISIT FOR SUTURE REMOVAL: ICD-10-CM

## 2024-02-06 PROCEDURE — 15853 REMOVAL SUTR/STAPL XREQ ANES: CPT | Performed by: FAMILY MEDICINE

## 2024-02-06 PROCEDURE — 3074F SYST BP LT 130 MM HG: CPT | Performed by: FAMILY MEDICINE

## 2024-02-06 PROCEDURE — 3078F DIAST BP <80 MM HG: CPT | Performed by: FAMILY MEDICINE

## 2024-02-06 PROCEDURE — 99212 OFFICE O/P EST SF 10 MIN: CPT | Performed by: FAMILY MEDICINE

## 2024-02-06 ASSESSMENT — FIBROSIS 4 INDEX: FIB4 SCORE: 1.15

## 2024-02-06 NOTE — PROGRESS NOTES
Chief Complaint:   Chief Complaint   Patient presents with    Suture / Staple Removal     Stitches removal       HPI: Established patient  Katy Perez is a 78 y.o. female who presents for suture removal    Visit for suture removal    10 days ago, patient had injury with a wound on her left leg, presented to urgent care, sutures were applied to secure bleeding and close the wound.    Denies concerns of infection like drainage from the wound or fever or pain.        Past medical history, family history, social history and medications reviewed and updated in the record.  Today  Current medications, problem list and allergies reviewed in [facility] today  Health maintenance topics are reviewed and updated.    Patient Active Problem List    Diagnosis Date Noted    Leukopenia 01/18/2024    Stage 2 chronic kidney disease 01/18/2024    Degenerative arthritis of left knee 12/18/2023    Primary osteoarthritis of left knee 10/25/2023    Palpitations 09/07/2023    Lightheadedness 09/07/2023    First degree heart block 09/07/2023    Vitamin D deficiency 03/08/2023    BMI 27.0-27.9,adult 03/08/2023    Neuropathy 03/08/2023    Elevated LDL cholesterol level 03/02/2023    Overweight with body mass index (BMI) of 29 to 29.9 in adult 04/18/2022    Disease of liver 12/07/2021    Disorder of vein 12/07/2021    Joint pain 12/07/2021    History of parathyroidectomy (HCC) 12/07/2021    History of vitamin D deficiency 12/07/2021    Osteopenia of left hip 08/24/2021    Aortic atherosclerosis (HCC) 08/24/2021    History of pulmonary embolism 05/22/2018    Essential hypertension 05/02/2016     Family History   Problem Relation Age of Onset    Hypertension Father     Heart Attack Father     Heart Failure Mother     Breast Cancer Maternal Grandmother     No Known Problems Sister     Heart Attack Maternal Grandfather     No Known Problems Paternal Grandmother     No Known Problems Paternal Grandfather      Social History     Socioeconomic  History    Marital status:      Spouse name: Not on file    Number of children: Not on file    Years of education: Not on file    Highest education level: Some college, no degree   Occupational History    Not on file   Tobacco Use    Smoking status: Never    Smokeless tobacco: Never   Vaping Use    Vaping Use: Never used   Substance and Sexual Activity    Alcohol use: Yes     Alcohol/week: 4.2 - 6.0 oz     Types: 7 - 10 Glasses of wine per week    Drug use: Yes     Frequency: 1.0 times per week     Types: Marijuana, Oral     Comment: smokes marijuana couple times a week; CBD oil  a couple times a week    Sexual activity: Not Currently     Partners: Male   Other Topics Concern    Not on file   Social History Narrative    Not on file     Social Determinants of Health     Financial Resource Strain: Low Risk  (9/6/2023)    Overall Financial Resource Strain (CARDIA)     Difficulty of Paying Living Expenses: Not hard at all   Food Insecurity: No Food Insecurity (9/6/2023)    Hunger Vital Sign     Worried About Running Out of Food in the Last Year: Never true     Ran Out of Food in the Last Year: Never true   Transportation Needs: Unknown (9/6/2023)    PRAPARE - Transportation     Lack of Transportation (Medical): No     Lack of Transportation (Non-Medical): Not on file   Physical Activity: Sufficiently Active (9/6/2023)    Exercise Vital Sign     Days of Exercise per Week: 7 days     Minutes of Exercise per Session: 90 min   Stress: No Stress Concern Present (9/6/2023)    [employer] [place] [employer] - Occupational Stress Questionnaire     Feeling of Stress : Not at all   Social Connections: Moderately Isolated (9/6/2023)    Social Connection and Isolation Panel [NHANES]     Frequency of Communication with Friends and Family: Once a week     Frequency of Social Gatherings with Friends and Family: Never     Attends Hinduism Services: Never     Active Member of Clubs or Organizations: Yes     Attends  "Club or Organization Meetings: More than 4 times per year     Marital Status:    Intimate Partner Violence: Not on file   Housing Stability: Unknown (9/6/2023)    Housing Stability Vital Sign     Unable to Pay for Housing in the Last Year: No     Number of Places Lived in the Last Year: Not on file     Unstable Housing in the Last Year: No       Current Outpatient Medications   Medication Sig Dispense Refill    losartan (COZAAR) 50 MG Tab Take 1 tablet by mouth every day. 100 Tablet 3    VITAMIN D PO Take  by mouth.      Calcium Carbonate Antacid (TUMS ULTRA 1000) 1000 MG Chew Tab Chew 2 Tablets every morning. 90 Tablet 3     No current facility-administered medications for this visit.         Review Of Systems  As documented in HPI above  PHYSICAL EXAMINATION:    /58 (BP Location: Left arm, Patient Position: Sitting, BP Cuff Size: Adult)   Pulse 61   Temp 36.3 °C (97.3 °F) (Temporal)   Resp 16   Ht 1.676 m (5' 6\")   Wt 81.3 kg (179 lb 4.8 oz)   SpO2 97%   BMI 28.94 kg/m²   Gen.: Well-developed, well-nourished, no apparent distress, pleasant and cooperative with the examination  HEENT: Normocephalic/atraumatic,     Wound description: On the side of the left leg on the mid leg area, mild redness at the edges of the wound around the suture line, no drainage.  Healing well.  No bleeding.  6 sutures were removed from the wound, cleaned well, tolerated procedure well.        ASSESSMENT/Plan:  1. Visit for suture removal  Removed sutures without events, clean the wound advised to keep it dry and clean.  Follow-up if any symptoms or signs of infection.  Voices understanding         Please note that this dictation was created using voice recognition software. I have made every reasonable attempt to correct obvious errors but there may be errors of grammar and content that I may have overlooked prior to finalization of this note.     "

## 2024-02-12 ENCOUNTER — HOSPITAL ENCOUNTER (OUTPATIENT)
Dept: RADIOLOGY | Facility: MEDICAL CENTER | Age: 79
End: 2024-02-12
Attending: ORTHOPAEDIC SURGERY
Payer: MEDICARE

## 2024-02-12 DIAGNOSIS — M17.12 PRIMARY OSTEOARTHRITIS OF LEFT KNEE: ICD-10-CM

## 2024-02-12 PROCEDURE — 73700 CT LOWER EXTREMITY W/O DYE: CPT | Mod: LT

## 2024-02-15 ENCOUNTER — OFFICE VISIT (OUTPATIENT)
Dept: MEDICAL GROUP | Facility: LAB | Age: 79
End: 2024-02-15
Payer: MEDICARE

## 2024-02-15 VITALS
TEMPERATURE: 97.3 F | BODY MASS INDEX: 27.62 KG/M2 | SYSTOLIC BLOOD PRESSURE: 118 MMHG | OXYGEN SATURATION: 96 % | WEIGHT: 176 LBS | DIASTOLIC BLOOD PRESSURE: 74 MMHG | RESPIRATION RATE: 20 BRPM | HEIGHT: 67 IN | HEART RATE: 58 BPM

## 2024-02-15 DIAGNOSIS — I70.0 AORTIC ATHEROSCLEROSIS (HCC): ICD-10-CM

## 2024-02-15 DIAGNOSIS — E78.00 ELEVATED LDL CHOLESTEROL LEVEL: ICD-10-CM

## 2024-02-15 DIAGNOSIS — E89.2 HISTORY OF PARATHYROIDECTOMY: ICD-10-CM

## 2024-02-15 DIAGNOSIS — Z00.00 ENCOUNTER FOR ANNUAL GENERAL MEDICAL EXAMINATION WITHOUT ABNORMAL FINDINGS IN ADULT: ICD-10-CM

## 2024-02-15 DIAGNOSIS — I10 ESSENTIAL HYPERTENSION: ICD-10-CM

## 2024-02-15 PROBLEM — M85.852 OSTEOPENIA OF LEFT HIP: Status: RESOLVED | Noted: 2021-08-24 | Resolved: 2024-02-15

## 2024-02-15 PROBLEM — H93.13 TINNITUS OF BOTH EARS: Status: ACTIVE | Noted: 2024-02-15

## 2024-02-15 PROBLEM — D72.819 LEUKOPENIA: Status: RESOLVED | Noted: 2024-01-18 | Resolved: 2024-02-15

## 2024-02-15 PROCEDURE — 3074F SYST BP LT 130 MM HG: CPT | Performed by: STUDENT IN AN ORGANIZED HEALTH CARE EDUCATION/TRAINING PROGRAM

## 2024-02-15 PROCEDURE — 3078F DIAST BP <80 MM HG: CPT | Performed by: STUDENT IN AN ORGANIZED HEALTH CARE EDUCATION/TRAINING PROGRAM

## 2024-02-15 PROCEDURE — 99397 PER PM REEVAL EST PAT 65+ YR: CPT | Performed by: STUDENT IN AN ORGANIZED HEALTH CARE EDUCATION/TRAINING PROGRAM

## 2024-02-15 ASSESSMENT — FIBROSIS 4 INDEX: FIB4 SCORE: 1.15

## 2024-02-15 NOTE — PROGRESS NOTES
Subjective:     Chief Complaint   Patient presents with    Annual Exam     HPI:   Katy Perez is a 78 y.o. female who presents for annual exam.     Ob-Gyn/ History:    Patient has GYN provider: no  /Para:  G0  No history of abnormal pap smears.  Patient is not currently sexually active.  Post-menopausal bleeding: denies  Urinary incontinence: on occasion with lifting things (bale of hay), not bothersome (small amount)  No significant bloating/fluid retention, pelvic pain, or vaginal discharge.    Health Maintenance  Advanced directive: on file   Osteoporosis Screen/ DEXA: 2023, repeat due 2028  Cholesterol Screenin   Diabetes Screenin   Aspirin Use:  The 10-year ASCVD risk score (Deniz MARTINEZ, et al., 2019) is: 19.9%    Diet: Heart healthy.  No processed/fast food.  Exercise: Pilates 2 days a week, 1 hour. Active at home.   Substance Abuse: ETOH-none now, drugs-none now, prior occasional marijuana      Cancer screening  Colorectal Cancer Screenin with 10yr recall.  Patient prefers to avoid future screening.  Lung Cancer Screening: Never smoked  Cervical Cancer Screening: aged out   Breast Cancer Screenin2023.  Patient prefers to avoid future screening.    Infectious disease screening/Immunizations  --HIV Screenin  --Hepatitis C Screenin   --Immunizations: Reviewed with patient-directed to the pharmacy for shingles series.    She  has a past medical history of Arthritis (2012), Blood clotting disorder (HCC), Hypertension, Hypocalcemia (2021), Leukopenia, MEDICAL HOME (2012), Osteopenia of left hip, Overweight with body mass index (BMI) of 29 to 29.9 in adult (2022), Pain, Renal disorder, and Stage 3a chronic kidney disease (HCC).    She has no past medical history of Breast cancer (HCC) or Clotting disorder (HCC).  She  has a past surgical history that includes pr breast augmentation with implant; gyn surgery (); bunionectomy  drew (Right, 1/10/2017); natali correction (Right, 1/10/2017); other (1970); knee arthroplasty total (11/5/2013); other orthopedic surgery (2017); and pr explore parathyroid glands (Right, 11/17/2021).    Family History   Problem Relation Age of Onset    Hypertension Father     Heart Attack Father     Heart Failure Mother     Breast Cancer Maternal Grandmother     No Known Problems Sister     Heart Attack Maternal Grandfather     No Known Problems Paternal Grandmother     No Known Problems Paternal Grandfather      Social History     Socioeconomic History    Marital status:      Spouse name: Not on file    Number of children: Not on file    Years of education: Not on file    Highest education level: Some college, no degree   Occupational History    Not on file   Tobacco Use    Smoking status: Never    Smokeless tobacco: Never   Vaping Use    Vaping Use: Never used   Substance and Sexual Activity    Alcohol use: Not Currently     Alcohol/week: 4.2 - 6.0 oz     Types: 7 - 10 Glasses of wine per week    Drug use: Not Currently     Types: Marijuana, Inhaled    Sexual activity: Not Currently     Partners: Male   Other Topics Concern    Not on file   Social History Narrative    Not on file     Social Determinants of Health     Financial Resource Strain: Low Risk  (9/6/2023)    Overall Financial Resource Strain (CARDIA)     Difficulty of Paying Living Expenses: Not hard at all   Food Insecurity: No Food Insecurity (9/6/2023)    Hunger Vital Sign     Worried About Running Out of Food in the Last Year: Never true     Ran Out of Food in the Last Year: Never true   Transportation Needs: Unknown (9/6/2023)    PRAPARE - Transportation     Lack of Transportation (Medical): No     Lack of Transportation (Non-Medical): Not on file   Physical Activity: Sufficiently Active (9/6/2023)    Exercise Vital Sign     Days of Exercise per Week: 7 days     Minutes of Exercise per Session: 90 min   Stress: No Stress Concern Present  (9/6/2023)    [place] [employer] - Occupational Stress Questionnaire     Feeling of Stress : Not at all   Social Connections: Moderately Isolated (9/6/2023)    Social Connection and Isolation Panel [NHANES]     Frequency of Communication with Friends and Family: Once a week     Frequency of Social Gatherings with Friends and Family: Never     Attends [employer] Services: Never     Active Member of Clubs or Organizations: Yes     Attends Club or Organization Meetings: More than 4 times per year     Marital Status:    Intimate Partner Violence: Not on file   Housing Stability: Unknown (9/6/2023)    Housing Stability Vital Sign     Unable to Pay for Housing in the Last Year: No     Number of Places Lived in the Last Year: Not on file     Unstable Housing in the Last Year: No       Patient Active Problem List    Diagnosis Date Noted    Tinnitus of both ears 02/15/2024    Stage 2 chronic kidney disease 01/18/2024    Degenerative arthritis of left knee 12/18/2023    Primary osteoarthritis of left knee 10/25/2023    Palpitations 09/07/2023    Lightheadedness 09/07/2023    First degree heart block 09/07/2023    Vitamin D deficiency 03/08/2023    BMI 27.0-27.9,adult 03/08/2023    Neuropathy 03/08/2023    Elevated LDL cholesterol level 03/02/2023    Overweight with body mass index (BMI) of 29 to 29.9 in adult 04/18/2022    Disease of liver 12/07/2021    Disorder of vein 12/07/2021    Joint pain 12/07/2021    History of parathyroidectomy (HCC) 12/07/2021    History of vitamin D deficiency 12/07/2021    Aortic atherosclerosis (HCC) 08/24/2021    History of pulmonary embolism 05/22/2018    Essential hypertension 05/02/2016     Current Outpatient Medications   Medication Sig Dispense Refill    losartan (COZAAR) 50 MG Tab Take 1 tablet by mouth every day. 100 Tablet 3    VITAMIN D PO Take  by mouth.      Calcium Carbonate Antacid (TUMS ULTRA 1000) 1000 MG Chew Tab Chew 2 Tablets every morning. 90 Tablet  "3     No current facility-administered medications for this visit.     Allergies   Allergen Reactions    Pcn [Penicillins] Rash    Statins [Hmg-Coa-R Inhibitors] Rash     Rash (crestor)     Review of Systems   Constitutional: Negative for fever, chills and malaise/fatigue.   HENT: Negative for congestion.    Eyes: Negative for pain.    Respiratory: Negative for cough and shortness of breath.  Cardiovascular: Negative for leg swelling.  Reports rare brief asymptomatic palpitations which is improved after adding back some sodium in her diet.  Gastrointestinal: Negative for nausea, vomiting, abdominal pain and diarrhea.   Genitourinary: Negative for dysuria and hematuria.   Skin: Negative for rash.   Neurological: Negative for dizziness, focal weakness and headaches.   Endo/Heme/Allergies: Does not bleed easily.   Psychiatric/Behavioral: Negative for depression.  The patient is not nervous/anxious.      Objective:     /74 (BP Location: Left arm, Patient Position: Sitting, BP Cuff Size: Adult)   Pulse (!) 58   Temp 36.3 °C (97.3 °F) (Temporal)   Resp 20   Ht 1.689 m (5' 6.5\")   Wt 79.8 kg (176 lb)   SpO2 96%   BMI 27.98 kg/m²   Body mass index is 27.98 kg/m².  Wt Readings from Last 4 Encounters:   02/15/24 79.8 kg (176 lb)   02/06/24 81.3 kg (179 lb 4.8 oz)   01/26/24 82.1 kg (181 lb)   01/18/24 81.7 kg (180 lb 3.2 oz)     Physical Exam:  Constitutional: Well-developed and well-nourished. Not diaphoretic. No distress.   Skin: Skin is warm and dry. No rash noted.  Head: Atraumatic without lesions.  Eyes: Conjunctivae and extraocular motions are normal. Pupils are equal, round. No scleral icterus.   Ears:  External ears unremarkable. Tympanic membranes clear and intact.  Nose: Nares patent. No discharge.   Mouth/Throat: Dentition is good. Tongue normal. Oropharynx is clear and moist. Posterior pharynx without erythema or exudates.  Neck: Supple, trachea midline. Normal range of motion. No thyromegaly present. " No lymphadenopathy--cervical or supraclavicular.  Cardiovascular: Regular rate and rhythm, S1 and S2 without murmur, rubs, or gallops.  Lungs: Normal inspiratory effort, CTA bilaterally, no wheezes/rhonchi/rales  Abdomen: Soft, non tender, and without distention. Active bowel sounds in all four quadrants. No rebound, guarding, masses or HSM.  Extremities: No cyanosis, clubbing, erythema, nor edema.   Neurological: Alert and oriented x 3. No gross or focal deficits.   Psychiatric:  Behavior, mood, and affect are appropriate.    Labs: Reviewed from 8/29/2023, 1/18/2024    Assessment and Plan:     1. Encounter for annual general medical examination without abnormal findings in adult  HCM: Reviewed with patient as above.  Immunizations discussed/recommended.  Age-appropriate anticipatory guidance discussed: sun screen, dental visits, healthy diet/exercise.    2. Essential hypertension  Chronic, controlled. Continue below medication(s). Discussed ideal ranges/return precautions.  - Comp Metabolic Panel; Future  - TSH WITH REFLEX TO FT4; Future  - CBC WITH DIFFERENTIAL; Future    losartan (COZAAR) 50 MG Tab, Take 1 tablet by mouth every day., Disp: 100 Tablet, Rfl: 3    3. Elevated LDL cholesterol level  - Comp Metabolic Panel; Future  - TSH WITH REFLEX TO FT4; Future  - Lipid Profile; Future  4. Aortic atherosclerosis (HCC)  Chronic, minimally elevated LDL cholesterol.  Statin is not an option given significant rash prior with resume statin.  Patient declines medication at this time in favor of continuing with healthy diet and exercise.  Will plan to trend labs again in August.  After having her knee surgery and cleared by orthopedics can start 81 mg aspirin daily for primary prevention.     5. History of parathyroidectomy (HCC)  Doing excellent, postoperative labs have been normal and stage III CKD resolved.  Trend labs with annual labs in August.  Continue calcium as below.  - Comp Metabolic Panel; Future  - VITAMIN  D,25 HYDROXY (DEFICIENCY); Future  - PTH INTACT (PTH ONLY); Future    Calcium Carbonate Antacid (TUMS ULTRA 1000) 1000 MG Chew Tab, Chew 2 Tablets every morning., Disp: 90 Tablet, Rfl: 3    Follow-up: Return in about 6 months (around 8/15/2024), or if symptoms worsen or fail to improve, for labs.

## 2024-02-16 NOTE — PATIENT INSTRUCTIONS
Vaccines due that can be received only at pharmacy:  Shingrix series (shingles)     Asprin 81mg EC daily    fasting labs due 8/2024

## 2024-02-19 PROCEDURE — RXMED WILLOW AMBULATORY MEDICATION CHARGE: Performed by: STUDENT IN AN ORGANIZED HEALTH CARE EDUCATION/TRAINING PROGRAM

## 2024-02-20 ENCOUNTER — PHARMACY VISIT (OUTPATIENT)
Dept: PHARMACY | Facility: MEDICAL CENTER | Age: 79
End: 2024-02-20
Payer: COMMERCIAL

## 2024-02-26 ASSESSMENT — PATIENT HEALTH QUESTIONNAIRE - PHQ9: CLINICAL INTERPRETATION OF PHQ2 SCORE: 0

## 2024-02-26 ASSESSMENT — ACTIVITIES OF DAILY LIVING (ADL): BATHING_REQUIRES_ASSISTANCE: 0

## 2024-02-26 ASSESSMENT — ENCOUNTER SYMPTOMS: GENERAL WELL-BEING: GOOD

## 2024-02-27 ENCOUNTER — OFFICE VISIT (OUTPATIENT)
Dept: FAMILY PLANNING/WOMEN'S HEALTH CLINIC | Facility: PHYSICIAN GROUP | Age: 79
End: 2024-02-27
Attending: FAMILY MEDICINE
Payer: MEDICARE

## 2024-02-27 VITALS
DIASTOLIC BLOOD PRESSURE: 70 MMHG | SYSTOLIC BLOOD PRESSURE: 122 MMHG | WEIGHT: 178.3 LBS | HEIGHT: 67 IN | BODY MASS INDEX: 27.99 KG/M2

## 2024-02-27 DIAGNOSIS — E78.00 ELEVATED LDL CHOLESTEROL LEVEL: ICD-10-CM

## 2024-02-27 DIAGNOSIS — I70.0 AORTIC ATHEROSCLEROSIS (HCC): ICD-10-CM

## 2024-02-27 DIAGNOSIS — I10 ESSENTIAL HYPERTENSION: ICD-10-CM

## 2024-02-27 DIAGNOSIS — K56.2 CECAL BASCULE (HCC): ICD-10-CM

## 2024-02-27 DIAGNOSIS — G62.9 NEUROPATHY: ICD-10-CM

## 2024-02-27 DIAGNOSIS — E89.2 HISTORY OF PARATHYROIDECTOMY: ICD-10-CM

## 2024-02-27 PROCEDURE — 3074F SYST BP LT 130 MM HG: CPT | Performed by: PHYSICIAN ASSISTANT

## 2024-02-27 PROCEDURE — 3078F DIAST BP <80 MM HG: CPT | Performed by: PHYSICIAN ASSISTANT

## 2024-02-27 PROCEDURE — G0439 PPPS, SUBSEQ VISIT: HCPCS | Performed by: PHYSICIAN ASSISTANT

## 2024-02-27 SDOH — ECONOMIC STABILITY: INCOME INSECURITY: HOW HARD IS IT FOR YOU TO PAY FOR THE VERY BASICS LIKE FOOD, HOUSING, MEDICAL CARE, AND HEATING?: NOT HARD AT ALL

## 2024-02-27 SDOH — ECONOMIC STABILITY: FOOD INSECURITY: WITHIN THE PAST 12 MONTHS, YOU WORRIED THAT YOUR FOOD WOULD RUN OUT BEFORE YOU GOT MONEY TO BUY MORE.: NEVER TRUE

## 2024-02-27 SDOH — ECONOMIC STABILITY: FOOD INSECURITY: WITHIN THE PAST 12 MONTHS, THE FOOD YOU BOUGHT JUST DIDN'T LAST AND YOU DIDN'T HAVE MONEY TO GET MORE.: NEVER TRUE

## 2024-02-27 ASSESSMENT — FIBROSIS 4 INDEX: FIB4 SCORE: 1.15

## 2024-02-27 NOTE — ASSESSMENT & PLAN NOTE
Chronic, stable. BP today 122/70. Pt monitors BP daily at home and reports this is always within goal. Denies significant dizziness/lightheadedness, chest pain. Continue current treatment regime: losartan 50mg daily . Follow up with PCP annually for continued monitoring and management.

## 2024-02-27 NOTE — ASSESSMENT & PLAN NOTE
Chronic, stable. Diagnosis made following incidental finding of disease on imaging. Associated with HLD. Encouraged Mediterranean diet and regular physical exercise. Follow up with PCP at least annually for continued monitoring.     CT 9/30/2020:  There is aortic atherosclerotic plaque.

## 2024-02-28 PROCEDURE — RXMED WILLOW AMBULATORY MEDICATION CHARGE

## 2024-02-28 NOTE — ASSESSMENT & PLAN NOTE
Chronic, stable. Most recent lipid panel from 8/2023 with LDL at 112. Recommend Mediterranean diet and regular physical activity. Follow up with PCP at least annually for continued monitoring and management.   Lab Results   Component Value Date/Time    CHOLSTRLTOT 178 08/29/2023 12:51 PM     (H) 08/29/2023 12:51 PM    HDL 48 08/29/2023 12:51 PM    TRIGLYCERIDE 88 08/29/2023 12:51 PM

## 2024-02-28 NOTE — ASSESSMENT & PLAN NOTE
In November 2021, pt underwent parathyroidectomy to address her hyperparathyroid adenoma in the setting of hyperparathyroidism, osteopenia, CKD. Following procedure, her kidney function improved. Continue to follow up at least annually with PCP for continued monitoring and management.

## 2024-02-28 NOTE — ASSESSMENT & PLAN NOTE
Chronic, stable. Noted incidentally on 2/2017 APCT. Pt remains asymptomatic. Follow up with PCP per routine for continued monitoring.

## 2024-02-28 NOTE — ASSESSMENT & PLAN NOTE
Chronic, stable, unchanged. Pt reporting numbness of her toes. Pt attributes this to hx of wearing high heels. Follow up with PCP as needed.

## 2024-02-28 NOTE — PROGRESS NOTES
Comprehensive Health Assessment Program     Katy Perez is a 78 y.o. here for her comprehensive health assessment.    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    Tinnitus of both ears 02/15/2024    Stage 2 chronic kidney disease 01/18/2024    Degenerative arthritis of left knee 12/18/2023    Primary osteoarthritis of left knee 10/25/2023    Palpitations 09/07/2023    Lightheadedness 09/07/2023    First degree heart block 09/07/2023    Vitamin D deficiency 03/08/2023    Neuropathy 03/08/2023    Elevated LDL cholesterol level 03/02/2023    Overweight with body mass index (BMI) of 29 to 29.9 in adult 04/18/2022    Disease of liver 12/07/2021    Disorder of vein 12/07/2021    Joint pain 12/07/2021    History of parathyroidectomy ([facility]) 12/07/2021    History of vitamin D deficiency 12/07/2021    Aortic atherosclerosis ([facility]) 08/24/2021    History of pulmonary embolism 05/22/2018    Essential hypertension 05/02/2016       Current Outpatient Medications   Medication Sig Dispense Refill    losartan (COZAAR) 50 MG Tab Take 1 tablet by mouth every day. 100 Tablet 3    VITAMIN D PO Take  by mouth.      Calcium Carbonate Antacid (TUMS ULTRA 1000) 1000 MG Chew Tab Chew 2 Tablets every morning. 90 Tablet 3     No current facility-administered medications for this visit.          Current supplements as per medication list.     Allergies:   Pcn [penicillins] and Statins [hmg-coa-r inhibitors]  Social History     Tobacco Use    Smoking status: Never    Smokeless tobacco: Never   Vaping Use    Vaping Use: Never used   Substance Use Topics    Alcohol use: Not Currently     Alcohol/week: 4.2 - 6.0 oz     Types: 7 - 10 Glasses of wine per week    Drug use: Not Currently     Types: Marijuana, Inhaled     Family History   Problem Relation Age of Onset    Hypertension Father     Heart Attack Father     Heart Failure Mother     Breast Cancer Maternal Grandmother     No Known  Problems Sister     Heart Attack Maternal Grandfather     No Known Problems Paternal Grandmother     No Known Problems Paternal Grandfather      Katy  has a past medical history of Arthritis (05/16/2012), Blood clotting disorder ([facility]), Hypertension, Hypocalcemia (12/07/2021), Leukopenia, MEDICAL HOME (11/08/2012), Osteopenia of left hip, Overweight with body mass index (BMI) of 29 to 29.9 in adult (04/18/2022), Pain, Renal disorder, and Stage 3a chronic kidney disease (HCC).    She has no past medical history of Breast cancer ([facility]) or Clotting disorder ([facility]).   Past Surgical History:   Procedure Laterality Date    IL EXPLORE PARATHYROID GLANDS Right 11/17/2021    Procedure: PARATHYROIDECTOMY;  Surgeon: Dottie Kemp M.D.;  Location: SURGERY SAME DAY [facility];  Service: General    BUNIONECTOMY DANK Right 1/10/2017    Procedure: BUNIONECTOMY DANK 2ND;  Surgeon: SHARMILA SmithPFiliMFili;  Location: SURGERY [facility];  Service:     HAMMERTOE CORRECTION Right 1/10/2017    Procedure: HAMMERTOE CORRECTION;  Surgeon: SHARMILA SmithPFiliMFili;  Location: SURGERY [facility];  Service:     OTHER ORTHOPEDIC SURGERY  2017    bunion    KNEE ARTHROPLASTY TOTAL  11/5/2013    Performed by Luis Faulkner M.D. at SURGERY [facility]    GYN SURGERY  1977    tubal    OTHER  1970    breast implants [facility]    IL BREAST AUGMENTATION WITH IMPLANT         Screening:  In the last six months have you experienced any leakage of urine? No    Depression Screening  Little interest or pleasure in doing things?  0 - not at all  Feeling down, depressed , or hopeless? 0 - not at all  Patient Health Questionnaire Score: 0     If depressive symptoms identified deferred to follow up visit unless specifically addressed in assessment and plan.    Interpretation of PHQ-9 Total Score   Score Severity   1-4 No Depression   5-9 Mild Depression   10-14 Moderate Depression   15-19 Moderately Severe Depression   20-27 Severe  Depression    Screening for Cognitive Impairment  Do you or any of your friends or family members have any concern about your memory? No  Three Minute Recall (Banana, Sunrise, Chair) 3/3    Hari clock face with all 12 numbers and set the hands to show 20 past 8.  Yes    Cognitive concerns identified deferred for follow up unless specifically addressed in assessment and plan.    Fall Risk Assessment  Has the patient had two or more falls in the last year or any fall with injury in the last year?  No    Safety Assessment  Do you always wear your seatbelt?  Yes  Any changes to home needed to function safely? No  Difficulty hearing.  No  Patient counseled about all safety risks that were identified.    Functional Assessment ADLs  Are there any barriers preventing you from cooking for yourself or meeting nutritional needs?  No.    Are there any barriers preventing you from driving safely or obtaining transportation?  No.    Are there any barriers preventing you from using a telephone or calling for help?  No    Are there any barriers preventing you from shopping?  No.    Are there any barriers preventing you from taking care of your own finances?  No    Are there any barriers preventing you from managing your medications?  No    Are there any barriers preventing you from showering, bathing or dressing yourself? No    Are there any barriers preventing you from doing housework or laundry? No  Are there any barriers preventing you from using the toilet?No  Are you currently engaging in any exercise or physical activity?  Yes.      Self-Assessment of Health  What is your perception of your health? Good  Do you sleep more than six hours a night? Yes  In the past 7 days, how much did pain keep you from doing your normal work? None  Do you spend quality time with family or friends (virtually or in person)? Yes  Do you usually eat a heart healthy diet that constists of a variety of fruits, vegetables, whole grains and fiber?  Yes  Do you eat foods high in fat and/or Fast Food more than three times per week? No    Advance Care Planning  Do you have an Advance Directive, Living Will, Durable Power of , or POLST? Yes  Advance Directive              Health Maintenance Summary            Postponed - Zoster (Shingles) Vaccines (1 of 2) Postponed until 6/12/2036      No completion history exists for this topic.              Annual Wellness Visit (Yearly) Next due on 2/27/2025 02/27/2024  Level of Service: ANNUAL WELLNESS VISIT-INCLUDES PPPS SUBSEQUE*    02/15/2024  Level of Service: PREVENTIVE VISIT,EST,65 & OVER    03/08/2023  Level of Service: [place] ANNUAL WELLNESS VISIT-INCLUDES PPPS SUBSEQUE*    04/18/2022  Level of Service: [place] ANNUAL WELLNESS VISIT-INCLUDES PPPS SUBSEQUE*    09/14/2021  Level of Service: [place] ANNUAL WELLNESS VISIT-INCLUDES PPPS SUBSEQUE*    Only the first 5 history entries have been loaded, but more history exists.              Bone Density Scan (Every 5 Years) Next due on 4/10/2028      04/10/2023  DS-BONE DENSITY STUDY (DEXA)    09/28/2020  DS-BONE DENSITY STUDY (DEXA)    03/03/2015  DS-BONE DENSITY STUDY (DEXA)    06/01/2012  DS-BONE DENSITY STUDY (DEXA)              IMM DTaP/Tdap/Td Vaccine (5 - Td or Tdap) Next due on 2/23/2031 02/23/2021  Imm Admin: Tdap Vaccine    08/19/2013  Imm Admin: Tdap Vaccine    06/22/2007  Imm Admin: Tdap Vaccine    05/16/2007  Imm Admin: Tdap Vaccine              Pneumococcal Vaccine: 65+ Years (Series Information) Completed      02/03/2017  Imm Admin: Pneumococcal Conjugate Vaccine (Prevnar/PCV-13)    05/16/2012  Imm Admin: Pneumococcal polysaccharide vaccine (PPSV-23)              Hepatitis C Screening  Completed      08/27/2019  Hepatitis C Antibody component of HEP C VIRUS ANTIBODY              Influenza Vaccine (Series Information) Completed      09/15/2023  Imm Admin: Influenza Vaccine Adult HD    09/30/2022  Imm Admin: Influenza, Unspecified - HISTORICAL DATA     09/29/2021  Imm Admin: Influenza, Unspecified - HISTORICAL DATA    10/08/2020  Imm Admin: Influenza Vaccine Quad Inj (Pf)    11/01/2019  Imm Admin: Influenza Vaccine Adult HD    Only the first 5 history entries have been loaded, but more history exists.              COVID-19 Vaccine (Series Information) Completed      10/09/2023  Imm Admin: Covid-19 Mrna (Spikevax) Moderna 12+ Years    09/30/2022  Imm Admin: MODERNA BIVALENT BOOSTER SARS-COV-2 VACCINE (6+)    05/10/2022  Imm Admin: MODERNA SARS-COV-2 VACCINE (12+)    11/04/2021  Imm Admin: MODERNA SARS-COV-2 VACCINE (12+)    03/31/2021  Imm Admin: MODERNA SARS-COV-2 VACCINE (12+)    Only the first 5 history entries have been loaded, but more history exists.              Hepatitis A Vaccine (Hep A) (Series Information) Aged Out      No completion history exists for this topic.              HPV Vaccines (Series Information) Aged Out      No completion history exists for this topic.              Polio Vaccine (Inactivated Polio) (Series Information) Aged Out      No completion history exists for this topic.              Meningococcal Immunization (Series Information) Aged Out      No completion history exists for this topic.              Discontinued - Mammogram  Discontinued        Frequency changed to Never automatically (Topic No Longer Applies)    04/10/2023  MA-SCREENING MAMMO BILAT W/IMPLANTS W/NGHIA W/CAD    04/11/2013  Done    06/01/2012  MA-SCREEING MAMMOGRAM W/ CAD              Discontinued - Cervical Cancer Screening  Discontinued        Frequency changed to Never automatically (Topic No Longer Applies)    06/18/2012  PAP IG, CT-NG TV HPV 16&18              Discontinued - Colorectal Cancer Screening  Discontinued        Frequency changed to Never automatically (Topic No Longer Applies)    06/11/2012  REFERRAL TO GI FOR COLONOSCOPY    06/05/2012  Colonoscopy (Done)              Discontinued - Hepatitis B Vaccine (Hep B)  Discontinued      No completion history  "exists for this topic.                    Patient Care Team:  Dori Mason D.O. as PCP - General (Family Medicine)  Melani Astudillo D.O. as PCP - [facility] Paneled  Charlotte Moore as        Financial Resource Strain: Low Risk  (2/27/2024)    Overall Financial Resource Strain (CARDIA)     Difficulty of Paying Living Expenses: Not hard at all      Transportation Needs: No Transportation Needs (2/27/2024)    PRAPARE - Transportation     Lack of Transportation (Medical): No     Lack of Transportation (Non-Medical): No      Food Insecurity: No Food Insecurity (2/27/2024)    Hunger Vital Sign     Worried About Running Out of Food in the Last Year: Never true     Ran Out of Food in the Last Year: Never true        Encounter Vitals  Blood Pressure : 122/70  Weight: 80.9 kg (178 lb 4.8 oz)  Height: 168.9 cm (5' 6.5\")  BMI (Calculated): 28.35     Alert, oriented in no acute distress.  Eye contact is good, speech goal directed, affect calm.    Assessment and Plan. The following treatment and monitoring plan is recommended:  Aortic atherosclerosis (HCC)  Chronic, stable. Diagnosis made following incidental finding of disease on imaging. Associated with HLD. Encouraged Mediterranean diet and regular physical exercise. Follow up with PCP at least annually for continued monitoring.     CT 9/30/2020:  There is aortic atherosclerotic plaque.    Essential hypertension  Chronic, stable. BP today 122/70. Pt monitors BP daily at home and reports this is always within goal. Denies significant dizziness/lightheadedness, chest pain. Continue current treatment regime: losartan 50mg daily . Follow up with PCP annually for continued monitoring and management.     Elevated LDL cholesterol level  Chronic, stable. Most recent lipid panel from 8/2023 with LDL at 112. Recommend Mediterranean diet and regular physical activity. Follow up with PCP at least annually for continued monitoring and management.   Lab " Results   Component Value Date/Time    CHOLSTRLTOT 178 08/29/2023 12:51 PM     (H) 08/29/2023 12:51 PM    HDL 48 08/29/2023 12:51 PM    TRIGLYCERIDE 88 08/29/2023 12:51 PM       History of parathyroidectomy (HCC)  In November 2021, pt underwent parathyroidectomy to address her hyperparathyroid adenoma in the setting of hyperparathyroidism, osteopenia, CKD. Following procedure, her kidney function improved. Continue to follow up at least annually with PCP for continued monitoring and management.    Neuropathy  Chronic, stable, unchanged. Pt reporting numbness of her toes. Pt attributes this to hx of wearing high heels. Follow up with PCP as needed.    Cecal bascule (HCC)  Chronic, stable. Noted incidentally on 2/2017 APCT. Pt remains asymptomatic. Follow up with PCP per routine for continued monitoring.    Services suggested: No services needed at this time  Health Care Screening: Age-appropriate preventive services recommended by USPTF and ACIP covered by Medicare were discussed today. Services ordered if indicated and agreed upon by the patient.  Referrals offered: Community-based lifestyle interventions to reduce health risks and promote self-management and wellness, fall prevention, nutrition, physical activity, tobacco-use cessation, weight loss, and mental health services as per orders if indicated.    Discussion today about general wellness and lifestyle habits:    Prevent falls and reduce trip hazards; Cautioned about securing or removing rugs.  Have a working fire alarm and carbon monoxide detector.  Engage in regular physical activity and social activities.    Follow-up: Return for follow up visit with PCP as previously scheduled.

## 2024-02-29 ENCOUNTER — PHARMACY VISIT (OUTPATIENT)
Dept: PHARMACY | Facility: MEDICAL CENTER | Age: 79
End: 2024-02-29
Payer: COMMERCIAL

## 2024-02-29 PROCEDURE — RXMED WILLOW AMBULATORY MEDICATION CHARGE

## 2024-03-04 PROBLEM — D75.9 BLOOD DYSCRASIA: Status: ACTIVE | Noted: 2024-03-04

## 2024-03-09 ENCOUNTER — HOSPITAL ENCOUNTER (EMERGENCY)
Facility: MEDICAL CENTER | Age: 79
End: 2024-03-09
Attending: EMERGENCY MEDICINE
Payer: MEDICARE

## 2024-03-09 VITALS
TEMPERATURE: 97.5 F | RESPIRATION RATE: 17 BRPM | DIASTOLIC BLOOD PRESSURE: 68 MMHG | OXYGEN SATURATION: 94 % | BODY MASS INDEX: 28.73 KG/M2 | SYSTOLIC BLOOD PRESSURE: 141 MMHG | HEART RATE: 80 BPM | WEIGHT: 178 LBS

## 2024-03-09 DIAGNOSIS — K59.09 OTHER CONSTIPATION: ICD-10-CM

## 2024-03-09 LAB
ALBUMIN SERPL BCP-MCNC: 3.3 G/DL (ref 3.2–4.9)
ALBUMIN/GLOB SERPL: 1.2 G/DL
ALP SERPL-CCNC: 173 U/L (ref 30–99)
ALT SERPL-CCNC: 25 U/L (ref 2–50)
ANION GAP SERPL CALC-SCNC: 10 MMOL/L (ref 7–16)
AST SERPL-CCNC: 25 U/L (ref 12–45)
BASOPHILS # BLD AUTO: 0.4 % (ref 0–1.8)
BASOPHILS # BLD: 0.04 K/UL (ref 0–0.12)
BILIRUB SERPL-MCNC: 0.6 MG/DL (ref 0.1–1.5)
BUN SERPL-MCNC: 23 MG/DL (ref 8–22)
CALCIUM ALBUM COR SERPL-MCNC: 9 MG/DL (ref 8.5–10.5)
CALCIUM SERPL-MCNC: 8.4 MG/DL (ref 8.5–10.5)
CHLORIDE SERPL-SCNC: 93 MMOL/L (ref 96–112)
CO2 SERPL-SCNC: 25 MMOL/L (ref 20–33)
CREAT SERPL-MCNC: 1.16 MG/DL (ref 0.5–1.4)
EOSINOPHIL # BLD AUTO: 0.11 K/UL (ref 0–0.51)
EOSINOPHIL NFR BLD: 1 % (ref 0–6.9)
ERYTHROCYTE [DISTWIDTH] IN BLOOD BY AUTOMATED COUNT: 45.7 FL (ref 35.9–50)
GFR SERPLBLD CREATININE-BSD FMLA CKD-EPI: 49 ML/MIN/1.73 M 2
GLOBULIN SER CALC-MCNC: 2.8 G/DL (ref 1.9–3.5)
GLUCOSE SERPL-MCNC: 106 MG/DL (ref 65–99)
HCT VFR BLD AUTO: 30.2 % (ref 37–47)
HGB BLD-MCNC: 10.2 G/DL (ref 12–16)
IMM GRANULOCYTES # BLD AUTO: 0.04 K/UL (ref 0–0.11)
IMM GRANULOCYTES NFR BLD AUTO: 0.4 % (ref 0–0.9)
LYMPHOCYTES # BLD AUTO: 1.08 K/UL (ref 1–4.8)
LYMPHOCYTES NFR BLD: 10 % (ref 22–41)
MCH RBC QN AUTO: 31 PG (ref 27–33)
MCHC RBC AUTO-ENTMCNC: 33.8 G/DL (ref 32.2–35.5)
MCV RBC AUTO: 91.8 FL (ref 81.4–97.8)
MONOCYTES # BLD AUTO: 1.01 K/UL (ref 0–0.85)
MONOCYTES NFR BLD AUTO: 9.3 % (ref 0–13.4)
NEUTROPHILS # BLD AUTO: 8.56 K/UL (ref 1.82–7.42)
NEUTROPHILS NFR BLD: 78.9 % (ref 44–72)
NRBC # BLD AUTO: 0 K/UL
NRBC BLD-RTO: 0 /100 WBC (ref 0–0.2)
PLATELET # BLD AUTO: 270 K/UL (ref 164–446)
PMV BLD AUTO: 10.5 FL (ref 9–12.9)
POTASSIUM SERPL-SCNC: 5.1 MMOL/L (ref 3.6–5.5)
PROT SERPL-MCNC: 6.1 G/DL (ref 6–8.2)
RBC # BLD AUTO: 3.29 M/UL (ref 4.2–5.4)
SODIUM SERPL-SCNC: 128 MMOL/L (ref 135–145)
WBC # BLD AUTO: 10.8 K/UL (ref 4.8–10.8)

## 2024-03-09 PROCEDURE — 36415 COLL VENOUS BLD VENIPUNCTURE: CPT

## 2024-03-09 PROCEDURE — 99284 EMERGENCY DEPT VISIT MOD MDM: CPT

## 2024-03-09 PROCEDURE — 80053 COMPREHEN METABOLIC PANEL: CPT

## 2024-03-09 PROCEDURE — 700105 HCHG RX REV CODE 258: Performed by: EMERGENCY MEDICINE

## 2024-03-09 PROCEDURE — 85025 COMPLETE CBC W/AUTO DIFF WBC: CPT

## 2024-03-09 RX ORDER — SODIUM CHLORIDE, SODIUM LACTATE, POTASSIUM CHLORIDE, CALCIUM CHLORIDE 600; 310; 30; 20 MG/100ML; MG/100ML; MG/100ML; MG/100ML
1000 INJECTION, SOLUTION INTRAVENOUS ONCE
Status: COMPLETED | OUTPATIENT
Start: 2024-03-09 | End: 2024-03-09

## 2024-03-09 RX ADMIN — SODIUM CHLORIDE, POTASSIUM CHLORIDE, SODIUM LACTATE AND CALCIUM CHLORIDE 1000 ML: 600; 310; 30; 20 INJECTION, SOLUTION INTRAVENOUS at 09:48

## 2024-03-09 ASSESSMENT — FIBROSIS 4 INDEX: FIB4 SCORE: 1.15

## 2024-03-09 ASSESSMENT — PAIN DESCRIPTION - PAIN TYPE: TYPE: SURGICAL PAIN

## 2024-03-09 NOTE — ED NOTES
Pt here today for no BM x's 4 days. Sts that she had L knee rplacement surgery on Tuesday and was on percocet and tramadol. Has not been able to have a BM and has tried dulcolax and miralax without success. Sts she is passing some flatus. Pt is A&O and answering approrpiately

## 2024-03-09 NOTE — ED PROVIDER NOTES
ED Provider Note    CHIEF COMPLAINT  Chief Complaint   Patient presents with    Post-Op Complications     3/5 left knee sx Dr Faulkner    Constipation     3/5 last BM       EXTERNAL RECORDS REVIEWED  Inpatient Notes the patient had an operation report for total knee replacement of the left knee on March 5, 2024    HPI/ROS  LIMITATION TO HISTORY   Select: : None  OUTSIDE HISTORIAN(S):      Katy Perez is a 78 y.o. female who presents status post total knee replacement 4 days ago.  Since that time she has not had a bowel movement.  She does not have any abdominal pain, no pain in her rectum.  She has been using MiraLAX 1 dose per day and Dulcolax for 2 days.  She was vomiting while taking the Percocet and tramadol which she stopped 2 days ago.  The patient has a history of stage III chronic kidney disease.    PAST MEDICAL HISTORY   has a past medical history of Arthritis (05/16/2012), Blood clotting disorder (HCC), Hypertension, Hypocalcemia (12/07/2021), Leukopenia, MEDICAL HOME (11/08/2012), Osteopenia of left hip, Overweight with body mass index (BMI) of 29 to 29.9 in adult (04/18/2022), Pain, Renal disorder, and Stage 3a chronic kidney disease (HCC).    SURGICAL HISTORY   has a past surgical history that includes breast augmentation with implant; gyn surgery (1977); bunionectomy drew (Right, 1/10/2017); hammertoe correction (Right, 1/10/2017); other (1970); knee arthroplasty total (11/5/2013); other orthopedic surgery (2017); explore parathyroid glands (Right, 11/17/2021); and total knee arthroplasty (Left, 3/5/2024).    FAMILY HISTORY  Family History   Problem Relation Age of Onset    Hypertension Father     Heart Attack Father     Heart Failure Mother     Breast Cancer Maternal Grandmother     No Known Problems Sister     Heart Attack Maternal Grandfather     No Known Problems Paternal Grandmother     No Known Problems Paternal Grandfather        SOCIAL HISTORY  Social History     Tobacco Use     Smoking status: Never    Smokeless tobacco: Never   Vaping Use    Vaping Use: Never used   Substance and Sexual Activity    Alcohol use: Not Currently     Alcohol/week: 4.2 - 6.0 oz     Types: 7 - 10 Glasses of wine per week    Drug use: Not Currently     Types: Marijuana, Inhaled    Sexual activity: Not Currently     Partners: Male       CURRENT MEDICATIONS  Home Medications       Reviewed by Kala Herman R.N. (Registered Nurse) on 03/09/24 at 0801  Med List Status: Partial     Medication Last Dose Status   Calcium Carbonate Antacid (TUMS ULTRA 1000) 1000 MG Chew Tab  Active   losartan (COZAAR) 50 MG Tab  Active   rivaroxaban (XARELTO) 10 MG Tab tablet  Active   VITAMIN D PO  Active                    ALLERGIES  Allergies   Allergen Reactions    Pcn [Penicillins] Rash    Skin Adhesives Rash     rash    Statins [Hmg-Coa-R Inhibitors] Rash     Rash (crestor)       PHYSICAL EXAM  VITAL SIGNS: BP (!) 142/70   Pulse 80   Temp 36.4 °C (97.5 °F) (Temporal)   Resp 18   Wt 80.7 kg (178 lb)   SpO2 93%   BMI 28.73 kg/m²    Constitutional: Alert.  HENT: No signs of trauma, Bilateral external ears normal, Nose normal.   Eyes: Pupils are equal and reactive, Conjunctiva normal, Non-icteric.   Neck: Normal range of motion, No tenderness, Supple, No stridor.   Lymphatic: No lymphadenopathy noted.   Cardiovascular: Regular rate and rhythm, no murmurs.   Thorax & Lungs: Normal breath sounds, No respiratory distress, No wheezing, No chest tenderness.   Abdomen: Bowel sounds normal, Soft, No tenderness, No peritoneal signs, No masses, No pulsatile masses.   Skin: Warm, Dry, No erythema, No rash.   Back: No bony tenderness, No CVA tenderness.   Extremities: Intact distal pulses, No edema, No tenderness, No cyanosis.  Musculoskeletal: Good range of motion in all major joints. No tenderness to palpation or major deformities noted.   Neurologic: Alert , Normal motor function, Normal sensory function, No focal deficits  noted.   Psychiatric: Affect normal, Judgment normal, Mood normal.       DIAGNOSTIC STUDIES / PROCEDURES      LABS  Labs Reviewed   CBC WITH DIFFERENTIAL - Abnormal; Notable for the following components:       Result Value    RBC 3.29 (*)     Hemoglobin 10.2 (*)     Hematocrit 30.2 (*)     Neutrophils-Polys 78.90 (*)     Lymphocytes 10.00 (*)     Neutrophils (Absolute) 8.56 (*)     Monos (Absolute) 1.01 (*)     All other components within normal limits   COMP METABOLIC PANEL - Abnormal; Notable for the following components:    Sodium 128 (*)     Chloride 93 (*)     Glucose 106 (*)     Bun 23 (*)     Calcium 8.4 (*)     Alkaline Phosphatase 173 (*)     All other components within normal limits   ESTIMATED GFR - Abnormal; Notable for the following components:    GFR (CKD-EPI) 49 (*)     All other components within normal limits           COURSE & MEDICAL DECISION MAKING    ED Observation Status? No; Patient does not meet criteria for ED Observation.     INITIAL ASSESSMENT, COURSE AND PLAN  Care Narrative:     The patient presents with a history of chronic kidney disease status post total knee replacement, she has had constipation.  Labs were ordered to assess kidney function and hydration status.  The patient was given 1 L LR IV.      10:34 AM the patient's BUN is slightly elevated, creatinine normal.  Sodium slightly low.  I would like the patient to drink Gatorade 0 and use MiraLAX to effect for constipation.  She will return for worsening symptoms.        HYDRATION: Based on the patient's presentation of Dehydration the patient was given IV fluids. IV Hydration was used because oral hydration was not as rapid as required. Upon recheck following hydration, the patient was feeling improved.      ADDITIONAL PROBLEM LIST  Stage III kidney disease  DISPOSITION AND DISCUSSIONS  I have discussed management of the patient with the following physicians and LILLY's: None    Discussion of management with other QHP or  appropriate source(s): None     Escalation of care considered, and ultimately not performed:diagnostic imaging and acute inpatient care management, however at this time, the patient is most appropriate for outpatient management    Barriers to care at this time, including but not limited to:  None .     Decision tools and prescription drugs considered including, but not limited to:  None .      The patient will return for new or worsening symptoms and is stable at the time of discharge.    The patient is referred to a primary physician for blood pressure management, diabetic screening, and for all other preventative health concerns.        DISPOSITION:  Patient will be discharged home in stable condition.    FOLLOW UP:  [facility], Emergency Dept  [address] [id number] [phone]    If symptoms worsen    Luis Faulkner M.D.  [address] [id number] [phone]      as scheduled      OUTPATIENT MEDICATIONS:  New Prescriptions    No medications on file         FINAL DIAGNOSIS  1. Other constipation           Electronically signed by: Cristian Dozier M.D., 3/9/2024 9:35 AM

## 2024-03-09 NOTE — ED TRIAGE NOTES
.  Chief Complaint   Patient presents with    Post-Op Complications     3/5 left knee sx Dr Faulkner    Constipation     3/5 last BM   To triage with s/o. Pt reports post op constipation, was taking tramadol and norco for pain no longer taking d/t side effects and nausea.  Has taken colace and miralax without relief.

## 2024-03-09 NOTE — DISCHARGE INSTRUCTIONS
Use as  much MiraLAX as needed for effect, drink Gatorade 0 to avoid dehydration  Follow instructions given to for postop total knee replacement by Dr. Faulkner

## 2024-05-27 PROCEDURE — RXMED WILLOW AMBULATORY MEDICATION CHARGE: Performed by: STUDENT IN AN ORGANIZED HEALTH CARE EDUCATION/TRAINING PROGRAM

## 2024-05-29 ENCOUNTER — PHARMACY VISIT (OUTPATIENT)
Dept: PHARMACY | Facility: MEDICAL CENTER | Age: 79
End: 2024-05-29
Payer: COMMERCIAL

## 2024-08-09 ENCOUNTER — HOSPITAL ENCOUNTER (OUTPATIENT)
Dept: LAB | Facility: MEDICAL CENTER | Age: 79
End: 2024-08-09
Attending: STUDENT IN AN ORGANIZED HEALTH CARE EDUCATION/TRAINING PROGRAM
Payer: MEDICARE

## 2024-08-09 DIAGNOSIS — I10 ESSENTIAL HYPERTENSION: ICD-10-CM

## 2024-08-09 DIAGNOSIS — I70.0 AORTIC ATHEROSCLEROSIS (HCC): ICD-10-CM

## 2024-08-09 DIAGNOSIS — Z90.89 HISTORY OF PARATHYROIDECTOMY: ICD-10-CM

## 2024-08-09 DIAGNOSIS — E78.00 ELEVATED LDL CHOLESTEROL LEVEL: ICD-10-CM

## 2024-08-09 DIAGNOSIS — Z98.890 HISTORY OF PARATHYROIDECTOMY: ICD-10-CM

## 2024-08-09 LAB
25(OH)D3 SERPL-MCNC: 35 NG/ML (ref 30–100)
ALBUMIN SERPL BCP-MCNC: 3.8 G/DL (ref 3.2–4.9)
ALBUMIN/GLOB SERPL: 1.6 G/DL
ALP SERPL-CCNC: 128 U/L (ref 30–99)
ALT SERPL-CCNC: 12 U/L (ref 2–50)
ANION GAP SERPL CALC-SCNC: 12 MMOL/L (ref 7–16)
AST SERPL-CCNC: 14 U/L (ref 12–45)
BASOPHILS # BLD AUTO: 0.9 % (ref 0–1.8)
BASOPHILS # BLD: 0.06 K/UL (ref 0–0.12)
BILIRUB SERPL-MCNC: 0.5 MG/DL (ref 0.1–1.5)
BUN SERPL-MCNC: 15 MG/DL (ref 8–22)
CALCIUM ALBUM COR SERPL-MCNC: 9.1 MG/DL (ref 8.5–10.5)
CALCIUM SERPL-MCNC: 8.9 MG/DL (ref 8.5–10.5)
CHLORIDE SERPL-SCNC: 107 MMOL/L (ref 96–112)
CHOLEST SERPL-MCNC: 193 MG/DL (ref 100–199)
CO2 SERPL-SCNC: 23 MMOL/L (ref 20–33)
CREAT SERPL-MCNC: 0.79 MG/DL (ref 0.5–1.4)
EOSINOPHIL # BLD AUTO: 0.13 K/UL (ref 0–0.51)
EOSINOPHIL NFR BLD: 2 % (ref 0–6.9)
ERYTHROCYTE [DISTWIDTH] IN BLOOD BY AUTOMATED COUNT: 46.9 FL (ref 35.9–50)
GFR SERPLBLD CREATININE-BSD FMLA CKD-EPI: 77 ML/MIN/1.73 M 2
GLOBULIN SER CALC-MCNC: 2.4 G/DL (ref 1.9–3.5)
GLUCOSE SERPL-MCNC: 89 MG/DL (ref 65–99)
HCT VFR BLD AUTO: 41.8 % (ref 37–47)
HDLC SERPL-MCNC: 41 MG/DL
HGB BLD-MCNC: 13.2 G/DL (ref 12–16)
IMM GRANULOCYTES # BLD AUTO: 0.02 K/UL (ref 0–0.11)
IMM GRANULOCYTES NFR BLD AUTO: 0.3 % (ref 0–0.9)
LDLC SERPL CALC-MCNC: 132 MG/DL
LYMPHOCYTES # BLD AUTO: 1.58 K/UL (ref 1–4.8)
LYMPHOCYTES NFR BLD: 24.6 % (ref 22–41)
MCH RBC QN AUTO: 29.5 PG (ref 27–33)
MCHC RBC AUTO-ENTMCNC: 31.6 G/DL (ref 32.2–35.5)
MCV RBC AUTO: 93.5 FL (ref 81.4–97.8)
MONOCYTES # BLD AUTO: 0.41 K/UL (ref 0–0.85)
MONOCYTES NFR BLD AUTO: 6.4 % (ref 0–13.4)
NEUTROPHILS # BLD AUTO: 4.22 K/UL (ref 1.82–7.42)
NEUTROPHILS NFR BLD: 65.8 % (ref 44–72)
NRBC # BLD AUTO: 0 K/UL
NRBC BLD-RTO: 0 /100 WBC (ref 0–0.2)
PLATELET # BLD AUTO: 297 K/UL (ref 164–446)
PMV BLD AUTO: 10.9 FL (ref 9–12.9)
POTASSIUM SERPL-SCNC: 4.1 MMOL/L (ref 3.6–5.5)
PROT SERPL-MCNC: 6.2 G/DL (ref 6–8.2)
PTH-INTACT SERPL-MCNC: 36.1 PG/ML (ref 14–72)
RBC # BLD AUTO: 4.47 M/UL (ref 4.2–5.4)
SODIUM SERPL-SCNC: 142 MMOL/L (ref 135–145)
TRIGL SERPL-MCNC: 101 MG/DL (ref 0–149)
TSH SERPL DL<=0.005 MIU/L-ACNC: 2.96 UIU/ML (ref 0.38–5.33)
WBC # BLD AUTO: 6.4 K/UL (ref 4.8–10.8)

## 2024-08-09 PROCEDURE — 80061 LIPID PANEL: CPT

## 2024-08-09 PROCEDURE — 80053 COMPREHEN METABOLIC PANEL: CPT

## 2024-08-09 PROCEDURE — 83970 ASSAY OF PARATHORMONE: CPT

## 2024-08-09 PROCEDURE — 36415 COLL VENOUS BLD VENIPUNCTURE: CPT

## 2024-08-09 PROCEDURE — 84443 ASSAY THYROID STIM HORMONE: CPT

## 2024-08-09 PROCEDURE — 82306 VITAMIN D 25 HYDROXY: CPT

## 2024-08-09 PROCEDURE — 85025 COMPLETE CBC W/AUTO DIFF WBC: CPT

## 2024-08-15 ENCOUNTER — PHARMACY VISIT (OUTPATIENT)
Dept: PHARMACY | Facility: MEDICAL CENTER | Age: 79
End: 2024-08-15
Payer: COMMERCIAL

## 2024-08-15 ENCOUNTER — OFFICE VISIT (OUTPATIENT)
Dept: MEDICAL GROUP | Facility: LAB | Age: 79
End: 2024-08-15
Payer: MEDICARE

## 2024-08-15 VITALS
DIASTOLIC BLOOD PRESSURE: 70 MMHG | BODY MASS INDEX: 29.89 KG/M2 | SYSTOLIC BLOOD PRESSURE: 150 MMHG | HEIGHT: 66 IN | TEMPERATURE: 97.8 F | RESPIRATION RATE: 20 BRPM | OXYGEN SATURATION: 97 % | WEIGHT: 186 LBS | HEART RATE: 63 BPM

## 2024-08-15 DIAGNOSIS — I10 ESSENTIAL HYPERTENSION: ICD-10-CM

## 2024-08-15 DIAGNOSIS — R74.8 ELEVATED ALKALINE PHOSPHATASE LEVEL: ICD-10-CM

## 2024-08-15 DIAGNOSIS — I70.0 AORTIC ATHEROSCLEROSIS (HCC): ICD-10-CM

## 2024-08-15 DIAGNOSIS — E78.5 HYPERLIPIDEMIA, UNSPECIFIED HYPERLIPIDEMIA TYPE: ICD-10-CM

## 2024-08-15 PROCEDURE — 3077F SYST BP >= 140 MM HG: CPT | Performed by: STUDENT IN AN ORGANIZED HEALTH CARE EDUCATION/TRAINING PROGRAM

## 2024-08-15 PROCEDURE — RXMED WILLOW AMBULATORY MEDICATION CHARGE: Performed by: STUDENT IN AN ORGANIZED HEALTH CARE EDUCATION/TRAINING PROGRAM

## 2024-08-15 PROCEDURE — 3078F DIAST BP <80 MM HG: CPT | Performed by: STUDENT IN AN ORGANIZED HEALTH CARE EDUCATION/TRAINING PROGRAM

## 2024-08-15 PROCEDURE — 99214 OFFICE O/P EST MOD 30 MIN: CPT | Performed by: STUDENT IN AN ORGANIZED HEALTH CARE EDUCATION/TRAINING PROGRAM

## 2024-08-15 RX ORDER — EZETIMIBE 10 MG/1
10 TABLET ORAL DAILY
Qty: 100 TABLET | Refills: 3 | Status: SHIPPED | OUTPATIENT
Start: 2024-08-15

## 2024-08-15 RX ORDER — LOSARTAN POTASSIUM 100 MG/1
100 TABLET ORAL DAILY
Qty: 100 TABLET | Refills: 3 | Status: SHIPPED | OUTPATIENT
Start: 2024-08-15

## 2024-08-15 ASSESSMENT — ENCOUNTER SYMPTOMS
WEIGHT LOSS: 0
CHILLS: 0
HEADACHES: 0
DIARRHEA: 0
PALPITATIONS: 0
DIZZINESS: 0
SHORTNESS OF BREATH: 0
ABDOMINAL PAIN: 0
NAUSEA: 0
FEVER: 0
VOMITING: 0
COUGH: 0

## 2024-08-15 ASSESSMENT — FIBROSIS 4 INDEX: FIB4 SCORE: 1.05

## 2024-08-15 NOTE — PROGRESS NOTES
Subjective:     Chief Complaint   Patient presents with    Lab Results     HPI:   Katy is a 79 y.o. female who presents today for follow-up and lab review.    Verbal consent was acquired by the patient to use [employer] ambient listening note generation during this visit Yes.    History of Present Illness  The patient is a 79-year-old female who presents for follow-up and to discuss lab results.     She reports that her blood pressure has been fluctuating over the past year and has been higher than prior. Her last check was yesterday, with a reading of 123/78 on her watch, which she calibrated twice but remains skeptical about its accuracy. She feels well overall but notes that her blood pressure has been trending higher than usual. She has not made any significant changes to her diet recently.    In August 2019, her cholesterol levels were low, but they have since increased. She is not currently taking any cholesterol medications. She maintains a heart-healthy diet, avoiding meat and opting for beans and vegetables instead. She lost 20 pounds on a diet plan, which she attributes to portion control rather than the quality of the food. She had a severe rash while on Crestor but reports no other side effects.    She recalls having liver nodules or hemangiomas in the past but has not undergone an MRI or CT scan for this. She reports no abdominal issues. She does not drink alcohol.    Review of Systems   Constitutional:  Negative for chills, fever, malaise/fatigue and weight loss.   Respiratory:  Negative for cough and shortness of breath.    Cardiovascular:  Negative for chest pain, palpitations and leg swelling.   Gastrointestinal:  Negative for abdominal pain, diarrhea, nausea and vomiting.   Skin:  Negative for rash.   Neurological:  Negative for dizziness and headaches.     Objective:     Exam:  BP (!) 150/70 (BP Location: Left arm, Patient Position: Sitting, BP Cuff Size: Adult)   Pulse 63   Temp 36.6 °C (97.8  "°F) (Temporal)   Resp 20   Ht 1.676 m (5' 6\")   Wt 84.4 kg (186 lb)   SpO2 97%   BMI 30.02 kg/m²  Body mass index is 30.02 kg/m².    Physical Exam  Vitals reviewed.   Constitutional:       General: She is not in acute distress.     Appearance: Normal appearance. She is not ill-appearing.   HENT:      Head: Normocephalic and atraumatic.      Nose: Nose normal.      Mouth/Throat:      Mouth: Mucous membranes are moist.   Eyes:      Conjunctiva/sclera: Conjunctivae normal.   Cardiovascular:      Rate and Rhythm: Normal rate and regular rhythm.      Heart sounds: Normal heart sounds. No murmur heard.  Pulmonary:      Effort: Pulmonary effort is normal. No respiratory distress.      Breath sounds: Normal breath sounds. No stridor. No wheezing, rhonchi or rales.   Musculoskeletal:      Cervical back: Normal range of motion and neck supple. No rigidity or tenderness.      Right lower leg: No edema.      Left lower leg: No edema.   Skin:     General: Skin is warm and dry.      Coloration: Skin is not jaundiced.   Neurological:      General: No focal deficit present.      Mental Status: She is alert and oriented to person, place, and time.   Psychiatric:         Mood and Affect: Mood normal.         Behavior: Behavior normal.         Thought Content: Thought content normal.       Labs: Reviewed from 8/9/2024  Imaging: Reviewed from 2017, 2020    Assessment & Plan:     79 y.o. female with the following -     1. Essential hypertension  Chronic, uncontrolled.  Increase losartan from 50 to 100 mg daily.  Discussed ideal ranges/return precautions.  - losartan (COZAAR) 100 MG Tab; Take 1 Tablet by mouth every day.  Dispense: 100 Tablet; Refill: 3  - MICROALBUMIN CREAT RATIO URINE; Future    2. Aortic atherosclerosis (HCC)   3. Hyperlipidemia, unspecified hyperlipidemia type  Chronic conditions with worsened HDL cholesterol.  Reviewed CT cardiac calcium score from 2020.  Discussed treatment options as she reports good exercise " and healthy diet.  Had severe reaction to Crestor prior so we will trial Zetia instead as below.  Labs in 3 months.  The 10-year ASCVD risk score (Deniz DK, et al., 2019) is: 32.7%  - ezetimibe (ZETIA) 10 MG Tab; Take 1 Tablet by mouth every day.  Dispense: 100 Tablet; Refill: 3  - Comp Metabolic Panel; Future  - Lipid Profile; Future    4. Elevated alkaline phosphatase level  Chronic, investigate further with next labs and check isoenzymes.  Vitamin D normal.  Reviewed imaging of the liver from 2017, repeat imaging with right upper quadrant ultrasound.  Plan pending results.  - ALKALINE PHOSPHATASE ISOENZYMES; Future  - Comp Metabolic Panel; Future  - US-RUQ; Future    Return in about 3 months (around 11/15/2024), or if symptoms worsen or fail to improve, for labs, Blood pressure.    Please note that this dictation was created using voice recognition software. I have made every reasonable attempt to correct obvious errors, but I expect that there are errors of grammar and possibly content that I did not discover before finalizing the note.

## 2024-08-15 NOTE — PATIENT INSTRUCTIONS
Ideal blood pressure <130/80 and at least <140/90.  If <100 for the top number we are over treating.  Severe range >180/120, with symptoms=ER    fasting labs due 1-2 weeks prior to next visit

## 2024-08-19 ENCOUNTER — APPOINTMENT (OUTPATIENT)
Dept: RADIOLOGY | Facility: MEDICAL CENTER | Age: 79
End: 2024-08-19
Attending: STUDENT IN AN ORGANIZED HEALTH CARE EDUCATION/TRAINING PROGRAM
Payer: MEDICARE

## 2024-08-20 ENCOUNTER — HOSPITAL ENCOUNTER (OUTPATIENT)
Dept: RADIOLOGY | Facility: MEDICAL CENTER | Age: 79
End: 2024-08-20
Attending: STUDENT IN AN ORGANIZED HEALTH CARE EDUCATION/TRAINING PROGRAM
Payer: MEDICARE

## 2024-08-20 DIAGNOSIS — R74.8 ELEVATED ALKALINE PHOSPHATASE LEVEL: ICD-10-CM

## 2024-08-20 PROCEDURE — 76705 ECHO EXAM OF ABDOMEN: CPT

## 2024-11-14 ENCOUNTER — HOSPITAL ENCOUNTER (OUTPATIENT)
Dept: LAB | Facility: MEDICAL CENTER | Age: 79
End: 2024-11-14
Attending: STUDENT IN AN ORGANIZED HEALTH CARE EDUCATION/TRAINING PROGRAM
Payer: MEDICARE

## 2024-11-14 DIAGNOSIS — R74.8 ELEVATED ALKALINE PHOSPHATASE LEVEL: ICD-10-CM

## 2024-11-14 DIAGNOSIS — I10 ESSENTIAL HYPERTENSION: ICD-10-CM

## 2024-11-14 DIAGNOSIS — E78.5 HYPERLIPIDEMIA, UNSPECIFIED HYPERLIPIDEMIA TYPE: ICD-10-CM

## 2024-11-14 PROCEDURE — 82570 ASSAY OF URINE CREATININE: CPT

## 2024-11-14 PROCEDURE — 80053 COMPREHEN METABOLIC PANEL: CPT

## 2024-11-14 PROCEDURE — 84075 ASSAY ALKALINE PHOSPHATASE: CPT | Mod: XU

## 2024-11-14 PROCEDURE — 80061 LIPID PANEL: CPT

## 2024-11-14 PROCEDURE — 84080 ASSAY ALKALINE PHOSPHATASES: CPT

## 2024-11-14 PROCEDURE — 36415 COLL VENOUS BLD VENIPUNCTURE: CPT

## 2024-11-14 PROCEDURE — 82043 UR ALBUMIN QUANTITATIVE: CPT

## 2024-11-15 ENCOUNTER — PATIENT MESSAGE (OUTPATIENT)
Dept: MEDICAL GROUP | Facility: LAB | Age: 79
End: 2024-11-15
Payer: MEDICARE

## 2024-11-15 DIAGNOSIS — E78.5 HYPERLIPIDEMIA, UNSPECIFIED HYPERLIPIDEMIA TYPE: ICD-10-CM

## 2024-11-15 LAB
ALBUMIN SERPL BCP-MCNC: 4 G/DL (ref 3.2–4.9)
ALBUMIN/GLOB SERPL: 1.7 G/DL
ALP SERPL-CCNC: 126 U/L (ref 30–99)
ALT SERPL-CCNC: 15 U/L (ref 2–50)
ANION GAP SERPL CALC-SCNC: 11 MMOL/L (ref 7–16)
AST SERPL-CCNC: 13 U/L (ref 12–45)
BILIRUB SERPL-MCNC: 0.5 MG/DL (ref 0.1–1.5)
BUN SERPL-MCNC: 19 MG/DL (ref 8–22)
CALCIUM ALBUM COR SERPL-MCNC: 8.7 MG/DL (ref 8.5–10.5)
CALCIUM SERPL-MCNC: 8.7 MG/DL (ref 8.5–10.5)
CHLORIDE SERPL-SCNC: 106 MMOL/L (ref 96–112)
CHOLEST SERPL-MCNC: 154 MG/DL (ref 100–199)
CO2 SERPL-SCNC: 24 MMOL/L (ref 20–33)
CREAT SERPL-MCNC: 0.77 MG/DL (ref 0.5–1.4)
CREAT UR-MCNC: 133.11 MG/DL
GFR SERPLBLD CREATININE-BSD FMLA CKD-EPI: 79 ML/MIN/1.73 M 2
GLOBULIN SER CALC-MCNC: 2.4 G/DL (ref 1.9–3.5)
GLUCOSE SERPL-MCNC: 89 MG/DL (ref 65–99)
HDLC SERPL-MCNC: 42 MG/DL
LDLC SERPL CALC-MCNC: 87 MG/DL
MICROALBUMIN UR-MCNC: <1.2 MG/DL
MICROALBUMIN/CREAT UR: NORMAL MG/G (ref 0–30)
POTASSIUM SERPL-SCNC: 4.1 MMOL/L (ref 3.6–5.5)
PROT SERPL-MCNC: 6.4 G/DL (ref 6–8.2)
SODIUM SERPL-SCNC: 141 MMOL/L (ref 135–145)
TRIGL SERPL-MCNC: 126 MG/DL (ref 0–149)

## 2024-11-18 LAB
ALP BONE SERPL-CCNC: 58 U/L (ref 0–55)
ALP ISOS SERPL HS-CCNC: 0 U/L
ALP LIVER SERPL-CCNC: 76 U/L (ref 0–94)
ALP SERPL-CCNC: 134 U/L (ref 40–120)

## 2024-11-18 PROCEDURE — RXMED WILLOW AMBULATORY MEDICATION CHARGE: Performed by: STUDENT IN AN ORGANIZED HEALTH CARE EDUCATION/TRAINING PROGRAM

## 2024-11-20 ENCOUNTER — PHARMACY VISIT (OUTPATIENT)
Dept: PHARMACY | Facility: MEDICAL CENTER | Age: 79
End: 2024-11-20
Payer: COMMERCIAL

## 2024-11-21 ENCOUNTER — APPOINTMENT (OUTPATIENT)
Dept: MEDICAL GROUP | Facility: LAB | Age: 79
End: 2024-11-21
Payer: MEDICARE

## 2025-01-09 ENCOUNTER — TELEPHONE (OUTPATIENT)
Dept: HEALTH INFORMATION MANAGEMENT | Facility: OTHER | Age: 80
End: 2025-01-09

## 2025-02-12 ENCOUNTER — OFFICE VISIT (OUTPATIENT)
Dept: MEDICAL GROUP | Facility: LAB | Age: 80
End: 2025-02-12
Payer: MEDICARE

## 2025-02-12 ENCOUNTER — RESULTS FOLLOW-UP (OUTPATIENT)
Dept: MEDICAL GROUP | Facility: LAB | Age: 80
End: 2025-02-12

## 2025-02-12 VITALS
HEART RATE: 74 BPM | BODY MASS INDEX: 29.89 KG/M2 | TEMPERATURE: 98.2 F | HEIGHT: 66 IN | OXYGEN SATURATION: 95 % | RESPIRATION RATE: 16 BRPM | DIASTOLIC BLOOD PRESSURE: 72 MMHG | SYSTOLIC BLOOD PRESSURE: 126 MMHG | WEIGHT: 186 LBS

## 2025-02-12 DIAGNOSIS — E78.5 HYPERLIPIDEMIA, UNSPECIFIED HYPERLIPIDEMIA TYPE: ICD-10-CM

## 2025-02-12 DIAGNOSIS — N30.00 ACUTE CYSTITIS WITHOUT HEMATURIA: ICD-10-CM

## 2025-02-12 DIAGNOSIS — I70.0 AORTIC ATHEROSCLEROSIS (HCC): ICD-10-CM

## 2025-02-12 DIAGNOSIS — R39.9 UTI SYMPTOMS: ICD-10-CM

## 2025-02-12 DIAGNOSIS — I10 ESSENTIAL HYPERTENSION: ICD-10-CM

## 2025-02-12 LAB
APPEARANCE UR: CLEAR
BILIRUB UR STRIP-MCNC: NEGATIVE MG/DL
COLOR UR AUTO: NORMAL
GLUCOSE UR STRIP.AUTO-MCNC: NEGATIVE MG/DL
KETONES UR STRIP.AUTO-MCNC: NEGATIVE MG/DL
LEUKOCYTE ESTERASE UR QL STRIP.AUTO: NORMAL
NITRITE UR QL STRIP.AUTO: NEGATIVE
PH UR STRIP.AUTO: 6 [PH] (ref 5–8)
PROT UR QL STRIP: 30 MG/DL
RBC UR QL AUTO: NORMAL
SP GR UR STRIP.AUTO: 1.02
UROBILINOGEN UR STRIP-MCNC: 0.2 MG/DL

## 2025-02-12 PROCEDURE — 3074F SYST BP LT 130 MM HG: CPT | Performed by: STUDENT IN AN ORGANIZED HEALTH CARE EDUCATION/TRAINING PROGRAM

## 2025-02-12 PROCEDURE — 81002 URINALYSIS NONAUTO W/O SCOPE: CPT | Performed by: STUDENT IN AN ORGANIZED HEALTH CARE EDUCATION/TRAINING PROGRAM

## 2025-02-12 PROCEDURE — 3078F DIAST BP <80 MM HG: CPT | Performed by: STUDENT IN AN ORGANIZED HEALTH CARE EDUCATION/TRAINING PROGRAM

## 2025-02-12 PROCEDURE — 99214 OFFICE O/P EST MOD 30 MIN: CPT | Performed by: STUDENT IN AN ORGANIZED HEALTH CARE EDUCATION/TRAINING PROGRAM

## 2025-02-12 RX ORDER — CEPHALEXIN 500 MG/1
500 CAPSULE ORAL 2 TIMES DAILY
Qty: 10 CAPSULE | Refills: 0 | Status: SHIPPED | OUTPATIENT
Start: 2025-02-12 | End: 2025-02-17

## 2025-02-12 ASSESSMENT — ENCOUNTER SYMPTOMS
CHILLS: 0
COUGH: 0
VOMITING: 0
FEVER: 0
FLANK PAIN: 0
DIARRHEA: 0
WEIGHT LOSS: 0
SHORTNESS OF BREATH: 0
ABDOMINAL PAIN: 1
NAUSEA: 0

## 2025-02-12 ASSESSMENT — FIBROSIS 4 INDEX: FIB4 SCORE: 0.87

## 2025-02-12 ASSESSMENT — PATIENT HEALTH QUESTIONNAIRE - PHQ9: CLINICAL INTERPRETATION OF PHQ2 SCORE: 0

## 2025-02-12 NOTE — PROGRESS NOTES
Verbal consent was acquired by the patient to use [facility] ambient listening note generation during this visit Yes.    Subjective:     Chief Complaint   Patient presents with    UTI     HPI:     History of Present Illness  The patient is a 79-year-old female presenting with concerns of a urinary tract infection (UTI).    She began experiencing symptoms approximately 2 to 3 days ago, characterized by pain in her lower abdomen and frequent urination every half hour, often with minimal or no urine output. She also reports occasional urinary incontinence, particularly during physical exertion such as lifting heavy objects. Her urine has a strong odor, but she does not experience any associated burning sensation or hematuria. She reports no back pain, fever, chills, nausea, or vomiting. She suspects dehydration due to her recent fluid intake, which included 4 cups of coffee yesterday morning and 1 cup today, with no other beverages consumed. She admits to inadequate water intake. She is not sexually active and reports no vaginal discharge. Her last UTI occurred several years ago. She reports no recent constipation or diarrhea.     Regarding Zetia she thought she might be allergic to, but it turns out she is not because she is on it now and it is fine. She has been on the medication for 5 days. She was advised to have some more blood work in advance, which she was getting ready to schedule, but she could not get in until the end of March 2025.    Blood pressure has been good on higher dose of losartan.     Review of Systems   Constitutional:  Negative for chills, fever, malaise/fatigue and weight loss.   Respiratory:  Negative for cough and shortness of breath.    Cardiovascular:  Negative for chest pain.   Gastrointestinal:  Positive for abdominal pain. Negative for diarrhea, nausea and vomiting.   Genitourinary:  Positive for frequency and urgency. Negative for dysuria, flank pain and hematuria.   Skin:  Negative for  "rash.       Objective:     Exam:  /72   Pulse 74   Temp 36.8 °C (98.2 °F)   Resp 16   Ht 1.676 m (5' 6\")   Wt 84.4 kg (186 lb)   SpO2 95%   BMI 30.02 kg/m²  Body mass index is 30.02 kg/m².    Physical Exam  Vitals reviewed.   Constitutional:       General: She is not in acute distress.     Appearance: Normal appearance. She is not ill-appearing.   HENT:      Head: Normocephalic and atraumatic.      Mouth/Throat:      Mouth: Mucous membranes are moist.   Eyes:      General: No scleral icterus.  Cardiovascular:      Rate and Rhythm: Normal rate and regular rhythm.      Heart sounds: Normal heart sounds.   Pulmonary:      Effort: Pulmonary effort is normal.      Breath sounds: Normal breath sounds.   Abdominal:      General: Abdomen is flat. Bowel sounds are normal. There is no distension.      Palpations: Abdomen is soft. There is no mass.      Tenderness: There is no abdominal tenderness. There is no right CVA tenderness, left CVA tenderness, guarding or rebound.   Skin:     General: Skin is warm and dry.      Coloration: Skin is not jaundiced.   Neurological:      General: No focal deficit present.      Mental Status: She is alert and oriented to person, place, and time.   Psychiatric:         Mood and Affect: Mood normal.         Behavior: Behavior normal.         Thought Content: Thought content normal.       Labs: Reviewed from 11/14/2024    Assessment & Plan:     79 y.o. female with the following -     1. Acute cystitis without hematuria  Uncomplicated UTI.  Point-of-care urinalysis consistent with this diagnosis.  No hx of recurrent UTI. The patient is allergic to PENICILLIN, but has taken keflex prior. Avoiding bactrim due to ARB. A prescription for Keflex 500 mg, to be taken twice daily for 5 days. She is advised to maintain adequate hydration. If symptoms persist, a reevaluation will be necessary. Gave return precautions and discussed preventative measures.  - cephALEXin (KEFLEX) 500 MG Cap; " Take 1 Capsule by mouth 2 times a day for 5 days.  Dispense: 10 Capsule; Refill: 0    2. UTI symptoms  - POCT Urinalysis    3. Aortic atherosclerosis (HCC)  4. Hyperlipidemia, unspecified hyperlipidemia type  Chronic, LDL controlled but labs done shortly after starting zetia. Has labs to trend lipids/CMP on medication after 3m. Continue medication(s) as below.    ezetimibe (ZETIA) 10 MG Tab, Take 1 Tablet by mouth every day., Disp: 100 Tablet, Rfl: 3    5. Essential hypertension  Chronic, controlled. Continue below medication(s).     losartan (COZAAR) 100 MG Tab, Take 1 Tablet by mouth every day., Disp: 100 Tablet, Rfl: 3    Return in 6 months (on 8/12/2025), or if symptoms worsen or fail to improve, for Annual.    Please note that this dictation was created using voice recognition software. I have made every reasonable attempt to correct obvious errors, but I expect that there are errors of grammar and possibly content that I did not discover before finalizing the note.

## 2025-02-21 ENCOUNTER — HOSPITAL ENCOUNTER (OUTPATIENT)
Dept: LAB | Facility: MEDICAL CENTER | Age: 80
End: 2025-02-21
Attending: STUDENT IN AN ORGANIZED HEALTH CARE EDUCATION/TRAINING PROGRAM
Payer: MEDICARE

## 2025-02-21 DIAGNOSIS — E78.5 HYPERLIPIDEMIA, UNSPECIFIED HYPERLIPIDEMIA TYPE: ICD-10-CM

## 2025-02-21 PROCEDURE — 80061 LIPID PANEL: CPT

## 2025-02-21 PROCEDURE — 80053 COMPREHEN METABOLIC PANEL: CPT

## 2025-02-21 PROCEDURE — 36415 COLL VENOUS BLD VENIPUNCTURE: CPT

## 2025-02-22 LAB
ALBUMIN SERPL BCP-MCNC: 3.9 G/DL (ref 3.2–4.9)
ALBUMIN/GLOB SERPL: 1.7 G/DL
ALP SERPL-CCNC: 114 U/L (ref 30–99)
ALT SERPL-CCNC: 29 U/L (ref 2–50)
ANION GAP SERPL CALC-SCNC: 14 MMOL/L (ref 7–16)
AST SERPL-CCNC: 20 U/L (ref 12–45)
BILIRUB SERPL-MCNC: 0.5 MG/DL (ref 0.1–1.5)
BUN SERPL-MCNC: 15 MG/DL (ref 8–22)
CALCIUM ALBUM COR SERPL-MCNC: 8.7 MG/DL (ref 8.5–10.5)
CALCIUM SERPL-MCNC: 8.6 MG/DL (ref 8.5–10.5)
CHLORIDE SERPL-SCNC: 105 MMOL/L (ref 96–112)
CHOLEST SERPL-MCNC: 140 MG/DL (ref 100–199)
CO2 SERPL-SCNC: 22 MMOL/L (ref 20–33)
CREAT SERPL-MCNC: 0.97 MG/DL (ref 0.5–1.4)
GFR SERPLBLD CREATININE-BSD FMLA CKD-EPI: 60 ML/MIN/1.73 M 2
GLOBULIN SER CALC-MCNC: 2.3 G/DL (ref 1.9–3.5)
GLUCOSE SERPL-MCNC: 90 MG/DL (ref 65–99)
HDLC SERPL-MCNC: 46 MG/DL
LDLC SERPL CALC-MCNC: 76 MG/DL
POTASSIUM SERPL-SCNC: 4.2 MMOL/L (ref 3.6–5.5)
PROT SERPL-MCNC: 6.2 G/DL (ref 6–8.2)
SODIUM SERPL-SCNC: 141 MMOL/L (ref 135–145)
TRIGL SERPL-MCNC: 92 MG/DL (ref 0–149)

## 2025-02-24 PROCEDURE — RXMED WILLOW AMBULATORY MEDICATION CHARGE: Performed by: STUDENT IN AN ORGANIZED HEALTH CARE EDUCATION/TRAINING PROGRAM

## 2025-03-03 ENCOUNTER — PHARMACY VISIT (OUTPATIENT)
Dept: PHARMACY | Facility: MEDICAL CENTER | Age: 80
End: 2025-03-03
Payer: COMMERCIAL

## 2025-03-03 ENCOUNTER — RESULTS FOLLOW-UP (OUTPATIENT)
Dept: MEDICAL GROUP | Facility: LAB | Age: 80
End: 2025-03-03

## 2025-03-06 ENCOUNTER — PATIENT MESSAGE (OUTPATIENT)
Dept: MEDICAL GROUP | Facility: LAB | Age: 80
End: 2025-03-06
Payer: MEDICARE

## 2025-03-06 DIAGNOSIS — Z01.84 IMMUNITY STATUS TESTING: ICD-10-CM

## 2025-03-13 ENCOUNTER — HOSPITAL ENCOUNTER (OUTPATIENT)
Dept: LAB | Facility: MEDICAL CENTER | Age: 80
End: 2025-03-13
Attending: STUDENT IN AN ORGANIZED HEALTH CARE EDUCATION/TRAINING PROGRAM
Payer: MEDICARE

## 2025-03-13 DIAGNOSIS — Z01.84 IMMUNITY STATUS TESTING: ICD-10-CM

## 2025-03-13 PROCEDURE — 86762 RUBELLA ANTIBODY: CPT

## 2025-03-13 PROCEDURE — 86765 RUBEOLA ANTIBODY: CPT

## 2025-03-13 PROCEDURE — 36415 COLL VENOUS BLD VENIPUNCTURE: CPT

## 2025-03-13 PROCEDURE — 86735 MUMPS ANTIBODY: CPT

## 2025-03-16 LAB
MEV IGG SER-ACNC: >300 AU/ML
MUV IGG SER IA-ACNC: 66.9 AU/ML
RUBV AB SER QL: 42.3 IU/ML

## 2025-03-17 ENCOUNTER — RESULTS FOLLOW-UP (OUTPATIENT)
Dept: MEDICAL GROUP | Facility: LAB | Age: 80
End: 2025-03-17
Payer: MEDICARE

## 2025-03-17 ENCOUNTER — APPOINTMENT (OUTPATIENT)
Dept: LAB | Facility: MEDICAL CENTER | Age: 80
End: 2025-03-17
Payer: MEDICARE

## 2025-03-26 ENCOUNTER — APPOINTMENT (OUTPATIENT)
Dept: MEDICAL GROUP | Facility: LAB | Age: 80
End: 2025-03-26
Payer: MEDICARE

## 2025-04-27 SDOH — ECONOMIC STABILITY: INCOME INSECURITY: IN THE LAST 12 MONTHS, WAS THERE A TIME WHEN YOU WERE NOT ABLE TO PAY THE MORTGAGE OR RENT ON TIME?: NO

## 2025-04-27 SDOH — ECONOMIC STABILITY: FOOD INSECURITY: WITHIN THE PAST 12 MONTHS, THE FOOD YOU BOUGHT JUST DIDN'T LAST AND YOU DIDN'T HAVE MONEY TO GET MORE.: NEVER TRUE

## 2025-04-27 SDOH — ECONOMIC STABILITY: FOOD INSECURITY: WITHIN THE PAST 12 MONTHS, YOU WORRIED THAT YOUR FOOD WOULD RUN OUT BEFORE YOU GOT MONEY TO BUY MORE.: NEVER TRUE

## 2025-04-27 SDOH — HEALTH STABILITY: PHYSICAL HEALTH: ON AVERAGE, HOW MANY MINUTES DO YOU ENGAGE IN EXERCISE AT THIS LEVEL?: 90 MIN

## 2025-04-27 SDOH — ECONOMIC STABILITY: INCOME INSECURITY: HOW HARD IS IT FOR YOU TO PAY FOR THE VERY BASICS LIKE FOOD, HOUSING, MEDICAL CARE, AND HEATING?: NOT HARD AT ALL

## 2025-04-27 SDOH — HEALTH STABILITY: PHYSICAL HEALTH: ON AVERAGE, HOW MANY DAYS PER WEEK DO YOU ENGAGE IN MODERATE TO STRENUOUS EXERCISE (LIKE A BRISK WALK)?: 7 DAYS

## 2025-04-27 ASSESSMENT — SOCIAL DETERMINANTS OF HEALTH (SDOH)
IN THE PAST 12 MONTHS, HAS THE ELECTRIC, GAS, OIL, OR WATER COMPANY THREATENED TO SHUT OFF SERVICE IN YOUR HOME?: NO
HOW OFTEN DO YOU GET TOGETHER WITH FRIENDS OR RELATIVES?: ONCE A WEEK
HOW OFTEN DO YOU ATTENT MEETINGS OF THE CLUB OR ORGANIZATION YOU BELONG TO?: 1 TO 4 TIMES PER YEAR
HOW OFTEN DO YOU HAVE A DRINK CONTAINING ALCOHOL: NEVER
HOW MANY DRINKS CONTAINING ALCOHOL DO YOU HAVE ON A TYPICAL DAY WHEN YOU ARE DRINKING: PATIENT DOES NOT DRINK
HOW OFTEN DO YOU ATTEND CHURCH OR RELIGIOUS SERVICES?: NEVER
HOW OFTEN DO YOU ATTENT MEETINGS OF THE CLUB OR ORGANIZATION YOU BELONG TO?: 1 TO 4 TIMES PER YEAR
HOW OFTEN DO YOU GET TOGETHER WITH FRIENDS OR RELATIVES?: ONCE A WEEK
WITHIN THE PAST 12 MONTHS, YOU WORRIED THAT YOUR FOOD WOULD RUN OUT BEFORE YOU GOT THE MONEY TO BUY MORE: NEVER TRUE
DO YOU BELONG TO ANY CLUBS OR ORGANIZATIONS SUCH AS CHURCH GROUPS UNIONS, FRATERNAL OR ATHLETIC GROUPS, OR SCHOOL GROUPS?: YES
IN A TYPICAL WEEK, HOW MANY TIMES DO YOU TALK ON THE PHONE WITH FAMILY, FRIENDS, OR NEIGHBORS?: ONCE A WEEK
HOW HARD IS IT FOR YOU TO PAY FOR THE VERY BASICS LIKE FOOD, HOUSING, MEDICAL CARE, AND HEATING?: NOT HARD AT ALL
DO YOU BELONG TO ANY CLUBS OR ORGANIZATIONS SUCH AS CHURCH GROUPS UNIONS, FRATERNAL OR ATHLETIC GROUPS, OR SCHOOL GROUPS?: YES
IN A TYPICAL WEEK, HOW MANY TIMES DO YOU TALK ON THE PHONE WITH FAMILY, FRIENDS, OR NEIGHBORS?: ONCE A WEEK
HOW OFTEN DO YOU ATTEND CHURCH OR RELIGIOUS SERVICES?: NEVER
HOW OFTEN DO YOU HAVE SIX OR MORE DRINKS ON ONE OCCASION: NEVER

## 2025-04-27 ASSESSMENT — LIFESTYLE VARIABLES
HOW MANY STANDARD DRINKS CONTAINING ALCOHOL DO YOU HAVE ON A TYPICAL DAY: PATIENT DOES NOT DRINK
SKIP TO QUESTIONS 9-10: 1
HOW OFTEN DO YOU HAVE A DRINK CONTAINING ALCOHOL: NEVER
AUDIT-C TOTAL SCORE: 0
HOW OFTEN DO YOU HAVE SIX OR MORE DRINKS ON ONE OCCASION: NEVER

## 2025-05-01 ENCOUNTER — APPOINTMENT (OUTPATIENT)
Dept: MEDICAL GROUP | Facility: LAB | Age: 80
End: 2025-05-01
Payer: MEDICARE

## 2025-05-01 VITALS
HEART RATE: 59 BPM | RESPIRATION RATE: 16 BRPM | DIASTOLIC BLOOD PRESSURE: 76 MMHG | TEMPERATURE: 97.2 F | BODY MASS INDEX: 30.39 KG/M2 | SYSTOLIC BLOOD PRESSURE: 144 MMHG | HEIGHT: 66 IN | OXYGEN SATURATION: 95 % | WEIGHT: 189.1 LBS

## 2025-05-01 DIAGNOSIS — Z80.8 FAMILY HISTORY OF MELANOMA: ICD-10-CM

## 2025-05-01 DIAGNOSIS — D49.2: ICD-10-CM

## 2025-05-01 DIAGNOSIS — E78.5 HYPERLIPIDEMIA, UNSPECIFIED HYPERLIPIDEMIA TYPE: ICD-10-CM

## 2025-05-01 DIAGNOSIS — I10 ESSENTIAL HYPERTENSION: ICD-10-CM

## 2025-05-01 DIAGNOSIS — Z00.00 MEDICARE ANNUAL WELLNESS VISIT, SUBSEQUENT: Primary | ICD-10-CM

## 2025-05-01 DIAGNOSIS — L57.0 ACTINIC KERATOSIS: ICD-10-CM

## 2025-05-01 PROCEDURE — 3077F SYST BP >= 140 MM HG: CPT | Performed by: STUDENT IN AN ORGANIZED HEALTH CARE EDUCATION/TRAINING PROGRAM

## 2025-05-01 PROCEDURE — G0439 PPPS, SUBSEQ VISIT: HCPCS | Performed by: STUDENT IN AN ORGANIZED HEALTH CARE EDUCATION/TRAINING PROGRAM

## 2025-05-01 PROCEDURE — 3078F DIAST BP <80 MM HG: CPT | Performed by: STUDENT IN AN ORGANIZED HEALTH CARE EDUCATION/TRAINING PROGRAM

## 2025-05-01 ASSESSMENT — ACTIVITIES OF DAILY LIVING (ADL): BATHING_REQUIRES_ASSISTANCE: 0

## 2025-05-01 ASSESSMENT — PATIENT HEALTH QUESTIONNAIRE - PHQ9: CLINICAL INTERPRETATION OF PHQ2 SCORE: 0

## 2025-05-01 ASSESSMENT — FIBROSIS 4 INDEX: FIB4 SCORE: 0.96

## 2025-05-01 ASSESSMENT — ENCOUNTER SYMPTOMS: GENERAL WELL-BEING: GOOD

## 2025-05-01 NOTE — PATIENT INSTRUCTIONS
Ideal blood pressure <130/80.  If <100 for the top number we may be over treating.  Severe range >180/120, with symptoms=ER  If high then start hydrochlorothiazide 12.5mg daily (labs 2-4 weeks after)    Vaccines due that can be received only at pharmacy:  COVID  Shingrix series (shingles)

## 2025-05-01 NOTE — PROGRESS NOTES
Chief Complaint   Patient presents with    Medicare Annual Wellness     Lab  Moles rt side/lt side       HPI:  Katy Perez is a 79 y.o. here for Medicare Annual Wellness Visit     Verbal consent was acquired by the patient to use [employer] ambient listening note generation during this visit Yes     History of Present Illness  The patient is a 79-year-old female who presents for her annual wellness visit and concern for some moles.    A new mole has been identified in the fold of her skin, described as elevated and warty in appearance. The mole exhibits multiple colors but does not cause any discomfort such as itching or bleeding. Additionally, a small, reddish bump on her right ear has been present for several years, with occasional flaking of the top layer of skin. This bump was previously evaluated by a physician who reassured her that it was not a cause for concern. No internal sensation is associated with the bump, although it does become scaly at times. Sunscreen is applied to her face but not to her arms, and she makes an effort to wear a hat when outdoors. Most of her time is spent outdoors during the day.    Blood pressure is monitored weekly, typically reading in the 120s over 80s. An active lifestyle is maintained, and a healthy diet with minimal salt intake is adhered to. Occasional leg swelling is reported, noticed after spending time outdoors and then sitting down at night. She is currently on losartan for blood pressure management, having switched from a previous medication due to a persistent cough.    Occasionally, a sensation of tightness or discomfort is experienced in the right mid abdomen in the morning, which resolves upon movement. Initially suspected to be related to the appendix, it is now believed to be due to a cecal vascular issue.    FAMILY HISTORY  Her father had melanoma on his face in his 60s.      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    Tinnitus of both ears 02/15/2024    Stage 2 chronic kidney disease 01/18/2024    Degenerative arthritis of left knee 12/18/2023    Primary osteoarthritis of left knee 10/25/2023    Palpitations 09/07/2023    Lightheadedness 09/07/2023    First degree heart block 09/07/2023    Vitamin D deficiency 03/08/2023    Neuropathy 03/08/2023    Hyperlipemia 03/02/2023    Overweight with body mass index (BMI) of 29 to 29.9 in adult 04/18/2022    Disease of liver 12/07/2021    Disorder of vein 12/07/2021    Joint pain 12/07/2021    History of parathyroidectomy 12/07/2021    Aortic atherosclerosis (HCC) 08/24/2021    History of pulmonary embolism 05/22/2018    Essential hypertension 05/02/2016       Current Outpatient Medications   Medication Sig Dispense Refill    losartan (COZAAR) 100 MG Tab Take 1 Tablet by mouth every day. 100 Tablet 3    ezetimibe (ZETIA) 10 MG Tab Take 1 Tablet by mouth every day. 100 Tablet 3    VITAMIN D PO Take  by mouth.      Calcium Carbonate Antacid (TUMS ULTRA 1000) 1000 MG Chew Tab Chew 2 Tablets every morning. 90 Tablet 3     No current facility-administered medications for this visit.          Current supplements as per medication list.     Allergies: Pcn [penicillins], Skin adhesives, and Statins [hmg-coa-r inhibitors]    Current social contact/activities: Gardening, Farming, On several boards in town     She  reports that she has never smoked. She has never used smokeless tobacco. She reports that she does not currently use alcohol after a past usage of about 4.2 - 6.0 oz of alcohol per week. She reports that she does not currently use drugs.  Counseling given: Not Answered    ROS:    Gait: Uses no assistive device  Ostomy: No  Other tubes: No  Amputations: No  Chronic oxygen use: No  Last eye exam: 4/2025  Wears hearing aids: No   : Reports urinary leakage  during the last 6 months that has not interfered at all with their daily activities or sleep.    Screening:    Depression Screening  Little interest or pleasure in doing things?  0 - not at all  Feeling down, depressed , or hopeless? 0 - not at all  Patient Health Questionnaire Score: 0     If depressive symptoms identified deferred to follow up visit unless specifically addressed in assessment and plan.    Interpretation of PHQ-9 Total Score   Score Severity   1-4 No Depression   5-9 Mild Depression   10-14 Moderate Depression   15-19 Moderately Severe Depression   20-27 Severe Depression    Screening for Cognitive Impairment  Do you or any of your friends or family members have any concern about your memory? No  Three Minute Recall (Village, Kitchen, Baby) 3/3    Hari clock face with all 12 numbers and set the hands to show 10 minutes past 11.  Yes    Cognitive concerns identified deferred for follow up unless specifically addressed in assessment and plan.    Fall Risk Assessment  Has the patient had two or more falls in the last year or any fall with injury in the last year?  No    Safety Assessment  Do you always wear your seatbelt?  Yes  Any changes to home needed to function safely? No  Difficulty hearing.  No  Patient counseled about all safety risks that were identified.    Functional Assessment ADLs  Are there any barriers preventing you from cooking for yourself or meeting nutritional needs?  No.    Are there any barriers preventing you from driving safely or obtaining transportation?  No.    Are there any barriers preventing you from using a telephone or calling for help?  No    Are there any barriers preventing you from shopping?  No.    Are there any barriers preventing you from taking care of your own finances?  No    Are there any barriers preventing you from managing your medications?  No    Are there any barriers preventing you from showering, bathing or dressing yourself? No    Are there any  barriers preventing you from doing housework or laundry? No  Are there any barriers preventing you from using the toilet?No  Are you currently engaging in any exercise or physical activity?  Yes.      Self-Assessment of Health  What is your perception of your health? Good    Do you sleep more than six hours a night? Yes    In the past 7 days, how much did pain keep you from doing your normal work? None    Do you spend quality time with family or friends (virtually or in person)? Yes    Do you usually eat a heart healthy diet that constists of a variety of fruits, vegetables, whole grains and fiber? Yes    Do you eat foods high in fat and/or Fast Food more than three times per week? No    How concerned are you that your medical conditions are not being well managed? Not at all    Are you worried that in the next 2 months, you may not have stable housing that you own, rent, or stay in as part of a household? No      Advance Care Planning  Do you have an Advance Directive, Living Will, Durable Power of , or POLST? Yes  Advance Directive   Durable Power of    is on file    Health Maintenance Summary            Upcoming       COVID-19 Vaccine (8 - 2024-25 season) Postponed until 4/29/2026 09/26/2024  Imm Admin: Covid-19 Mrna (Spikevax) Moderna 12+ Years    05/31/2024  Imm Admin: Covid-19 Mrna (Spikevax) Moderna 12+ Years    10/09/2023  Imm Admin: Covid-19 Mrna (Spikevax) Moderna 12+ Years    09/30/2022  Imm Admin: MODERNA BIVALENT BOOSTER SARS-COV-2 VACCINE (6+)    05/10/2022  Imm Admin: MODERNA SARS-COV-2 VACCINE (12+)     Only the first 5 history entries have been loaded, but more history exists.            Zoster (Shingles) Vaccines (1 of 2) Postponed until 6/12/2036      No completion history exists for this topic.              Annual Wellness Visit (Yearly) Next due on 5/1/2026 05/01/2025  Visit Dx: Medicare annual wellness visit, subsequent    05/01/2025  Level of Service: [place] ANNUAL  WELLNESS VISIT-INCLUDES PPPS SUBSEQUE*    02/27/2024  Level of Service: [place] ANNUAL WELLNESS VISIT-INCLUDES PPPS SUBSEQUE*    02/15/2024  Level of Service: PREVENTIVE VISIT,EST,65 & OVER    03/08/2023  Level of Service: [place] ANNUAL WELLNESS VISIT-INCLUDES PPPS SUBSEQUE*     Only the first 5 history entries have been loaded, but more history exists.            Bone Density Scan (Every 5 Years) Next due on 4/10/2028      04/10/2023  DS-BONE DENSITY STUDY (DEXA)    09/28/2020  DS-BONE DENSITY STUDY (DEXA)    03/03/2015  DS-BONE DENSITY STUDY (DEXA)    06/01/2012  DS-BONE DENSITY STUDY (DEXA)              IMM DTaP/Tdap/Td Vaccine (5 - Td or Tdap) Next due on 2/23/2031 02/23/2021  Imm Admin: Tdap Vaccine    08/19/2013  Imm Admin: Tdap Vaccine    06/22/2007  Imm Admin: Tdap Vaccine    05/16/2007  Imm Admin: Tdap Vaccine                      Completed or No Longer Recommended       Influenza Vaccine (Series Information) Completed      10/09/2024  Imm Admin: Influenza, unspecified formulation    09/15/2023  Imm Admin: Influenza Vaccine Adult HD    09/30/2022  Imm Admin: Influenza, Unspecified - HISTORICAL DATA    09/29/2021  Imm Admin: Influenza, Unspecified - HISTORICAL DATA    10/08/2020  Imm Admin: Influenza Vaccine Quad Inj (Pf)      Only the first 5 history entries have been loaded, but more history exists.              Hepatitis C Screening  Completed      08/27/2019  Hepatitis C Antibody component of HEP C VIRUS ANTIBODY              Pneumococcal Vaccine: 50+ Years (Series Information) Completed      02/03/2017  Imm Admin: Pneumococcal Conjugate Vaccine (Prevnar/PCV-13)    05/16/2012  Imm Admin: Pneumococcal polysaccharide vaccine (PPSV-23)              Hepatitis A Vaccine (Hep A) (Series Information) Aged Out      No completion history exists for this topic.              HPV Vaccines (Series Information) Aged Out     No completion history exists for this topic.              Polio Vaccine (Inactivated Polio) (Series  Information) Aged Out     No completion history exists for this topic.              Meningococcal Immunization (Series Information) Aged Out     No completion history exists for this topic.              Mammogram  Discontinued        Frequency changed to Never automatically (Topic No Longer Applies)    04/10/2023  MA-SCREENING MAMMO BILAT W/IMPLANTS W/NGHIA W/CAD    04/11/2013  Done    06/01/2012  MA-SCREEING MAMMOGRAM W/ CAD              Cervical Cancer Screening  Discontinued        Frequency changed to Never automatically (Topic No Longer Applies)    06/18/2012  PAP IG, CT-NG TV HPV 16&18              Colorectal Cancer Screening  Discontinued        Frequency changed to Never automatically (Topic No Longer Applies)    08/25/2022  Order placed for Referral to GI for Colonoscopy by Dori Mason D.O.    06/11/2012  REFERRAL TO GI FOR COLONOSCOPY    06/05/2012  Colonoscopy (Done)              Hepatitis B Vaccine (Hep B)  Discontinued     No completion history exists for this topic.                            Patient Care Team:  Dori Mason D.O. as PCP - General (Family Medicine)  Charlotte Moore as        Social History     Tobacco Use    Smoking status: Never    Smokeless tobacco: Never   Vaping Use    Vaping status: Never Used   Substance Use Topics    Alcohol use: Not Currently     Alcohol/week: 4.2 - 6.0 oz     Types: 7 - 10 Glasses of wine per week    Drug use: Not Currently     Family History   Problem Relation Age of Onset    Hypertension Father     Heart Attack Father     Heart Failure Mother     Breast Cancer Maternal Grandmother     No Known Problems Sister     Heart Attack Maternal Grandfather     No Known Problems Paternal Grandmother     No Known Problems Paternal Grandfather      She  has a past medical history of Arthritis (05/16/2012), Blood clotting disorder (HCC), Hypertension, Hypocalcemia (12/07/2021), Leukopenia (1/18/2024), MEDICAL HOME (11/08/2012),  "Osteopenia of left hip (8/24/2021), Overweight with body mass index (BMI) of 29 to 29.9 in adult (04/18/2022), Pain, Renal disorder, and Stage 3a chronic kidney disease.    She has no past medical history of Breast cancer (HCC) or Clotting disorder (HCC).   Past Surgical History:   Procedure Laterality Date    PB TOTAL KNEE ARTHROPLASTY Left 3/5/2024    Procedure: LEFT TOTAL KNEE ARTHROPLASTY;  Surgeon: Luis Faulkner M.D.;  Location: [facility];  Service: Orthopedics    IN EXPLORE PARATHYROID GLANDS Right 11/17/2021    Procedure: PARATHYROIDECTOMY;  Surgeon: Dottie Kemp M.D.;  Location: SURGERY SAME DAY [facility];  Service: General    BUNIONECTOMY DANK Right 1/10/2017    Procedure: BUNIONECTOMY DANK 2ND;  Surgeon: SHARMILA SmithPFiliMFili;  Location: SURGERY [facility];  Service:     HAMMERTOE CORRECTION Right 1/10/2017    Procedure: HAMMERTOE CORRECTION;  Surgeon: SHARMILA SmithPFiliMFili;  Location: SURGERY [facility];  Service:     OTHER ORTHOPEDIC SURGERY  2017    bunion    KNEE ARTHROPLASTY TOTAL  11/5/2013    Performed by Luis Faulkner M.D. at SURGERY [facility]    GYN SURGERY  1977    tubal    OTHER  1970    breast implants [facility]    IN BREAST AUGMENTATION WITH IMPLANT         Exam:   BP (!) 144/76 (BP Location: Right arm, Patient Position: Sitting, BP Cuff Size: Adult)   Pulse (!) 59   Temp 36.2 °C (97.2 °F) (Temporal)   Resp 16   Ht 1.676 m (5' 6\")   Wt 85.8 kg (189 lb 1.6 oz)   SpO2 95%  Body mass index is 30.52 kg/m².    Hearing excellent.    Dentition good  Physical Exam  Vitals reviewed.   Constitutional:       General: She is not in acute distress.     Appearance: Normal appearance. She is not ill-appearing.   HENT:      Head: Normocephalic and atraumatic.      Nose: Nose normal.      Mouth/Throat:      Mouth: Mucous membranes are moist.   Eyes:      Conjunctiva/sclera: Conjunctivae normal.   Cardiovascular:      Rate and Rhythm: Normal rate and " regular rhythm.      Heart sounds: Normal heart sounds. No murmur heard.  Pulmonary:      Effort: Pulmonary effort is normal. No respiratory distress.      Breath sounds: Normal breath sounds. No stridor. No wheezing, rhonchi or rales.   Musculoskeletal:      Cervical back: Normal range of motion and neck supple. No rigidity or tenderness.      Right lower leg: No edema.      Left lower leg: No edema.   Skin:     General: Skin is warm and dry.      Comments: Many cherry hemangiomas of the trunk.  Scattered seborrheic keratosis.  Scaly flat papule with erythematous base right pinna. Fleshy pedunculated warty papule tan with dark brown spots right lateral trunk at bra line.    Neurological:      General: No focal deficit present.      Mental Status: She is alert and oriented to person, place, and time.   Psychiatric:         Mood and Affect: Mood normal.         Behavior: Behavior normal.         Thought Content: Thought content normal.       Labs: Reviewed from 2/21/2025    Assessment and Plan. The following treatment and monitoring plan is recommended:      1. Medicare annual wellness visit, subsequent  Services suggested: Dermatology.   Health Care Screening: Age-appropriate preventive services recommended by USPTF and ACIP covered by Medicare were discussed today. Services ordered if indicated and agreed upon by the patient.  Immunizations reviewed/recommended and directed to the pharmacy if not available in clinic.  Referrals offered: Community-based lifestyle interventions to reduce health risks and promote self-management and wellness, fall prevention, nutrition, physical activity, tobacco-use cessation, weight loss, and mental health services as per orders if indicated.    Discussion today about general wellness and lifestyle habits:    Prevent falls and reduce trip hazards; Cautioned about securing or removing rugs.  Have a working fire alarm, fire extinguisher and carbon monoxide detector.  Engage in regular  physical activity and social activities.     2. Hyperlipidemia, unspecified hyperlipidemia type  Chronic, controlled. Continue Zetia 10 mg daily.  Trend annually.    3. Family history of melanoma  4. Actinic keratosis  5. Neoplasm of skin of trunk  Chronic ear changes appears to be actinic keratosis with new mole on right lateral trunk which she reports came on rapidly (does have benign features).   - Referral to a dermatologist will be made for further evaluation and potential treatment, such as cryotherapy/excision.   - Discussed the importance of dermatological evaluation due to family history of melanoma.  - Advised to use sunscreen regularly and wear protective clothing when exposed to the sun.  - Referral to Dermatology    6. Essential hypertension  Chronic, still elevated on repeat.  - Advised to continue monitoring blood pressure at home, aiming for readings <130/80.  - If blood pressure continues to exceed 140/90, a low dose of hydrochlorothiazide 12.5 mg will be added to the current losartan 100mg daily regimen.  - Laboratory tests will be conducted within a month of starting hydrochlorothiazide to monitor sodium and potassium levels.    Follow-up: Return in about 6 months (around 11/1/2025), or if symptoms worsen or fail to improve, for Annual.

## 2025-06-03 ENCOUNTER — OFFICE VISIT (OUTPATIENT)
Dept: DERMATOLOGY | Facility: IMAGING CENTER | Age: 80
End: 2025-06-03
Payer: MEDICARE

## 2025-06-03 DIAGNOSIS — L82.1 SK (SEBORRHEIC KERATOSIS): ICD-10-CM

## 2025-06-03 DIAGNOSIS — L91.8 ACROCHORDON: ICD-10-CM

## 2025-06-03 DIAGNOSIS — D23.9 DERMATOFIBROMA: ICD-10-CM

## 2025-06-03 DIAGNOSIS — L30.9 DERMATITIS: ICD-10-CM

## 2025-06-03 DIAGNOSIS — D18.01 CHERRY ANGIOMA: ICD-10-CM

## 2025-06-03 DIAGNOSIS — D48.9 NEOPLASM OF UNCERTAIN BEHAVIOR: ICD-10-CM

## 2025-06-03 DIAGNOSIS — D22.9 MULTIPLE NEVI: ICD-10-CM

## 2025-06-03 DIAGNOSIS — L81.4 LENTIGINES: ICD-10-CM

## 2025-06-03 DIAGNOSIS — Z12.83 SKIN CANCER SCREENING: ICD-10-CM

## 2025-06-03 PROCEDURE — 11102 TANGNTL BX SKIN SINGLE LES: CPT | Performed by: NURSE PRACTITIONER

## 2025-06-03 PROCEDURE — 99203 OFFICE O/P NEW LOW 30 MIN: CPT | Mod: 25 | Performed by: NURSE PRACTITIONER

## 2025-06-03 PROCEDURE — 11103 TANGNTL BX SKIN EA SEP/ADDL: CPT | Performed by: NURSE PRACTITIONER

## 2025-06-03 PROCEDURE — 11200 RMVL SKIN TAGS UP TO&INC 15: CPT | Mod: 59 | Performed by: NURSE PRACTITIONER

## 2025-06-03 RX ORDER — TRIAMCINOLONE ACETONIDE 1 MG/G
CREAM TOPICAL
Qty: 45 G | Refills: 3 | Status: SHIPPED | OUTPATIENT
Start: 2025-06-03

## 2025-06-03 NOTE — PROGRESS NOTES
DERMATOLOGY NOTE  NEW VISIT       Chief complaint: Establish Care (JASON) and Skin Lesion      HPI/location: Rt under arm  Time present: in the las 6 months  Painful lesion: No  Itching lesion: No  Enlarging lesion: No      History of skin cancer: No  History of precancers/actinic keratoses: No  History of biopsies:Yes, Details: benign  History of blistering/severe sunburns:Yes, Details:    Family history of skin cancer:Yes, Details: father unknown type  Family history of atypical moles:No      Allergies[1]     MEDICATIONS:  Medications relevant to specialty reviewed.     REVIEW OF SYSTEMS:   Positive for skin (see HPI)  Negative for fevers and chills       EXAM:  There were no vitals taken for this visit.  Constitutional: Well-developed, well-nourished, and in no distress.     A total body skin exam was performed excluding the genitals per patient preference and including the following areas: head (including face), neck, chest, abdomen, groin/buttocks, back, bilateral upper extremities, and bilateral lower extremities with the following pertinent findings listed below and/or in assessment/plan.     -sun exposed skin of trunk and b/l upper, lower extremities and face with scattered clinically benign light brown reticulated macules all of which were morphologically similar and none of which were suspicious for skin cancer today on exam    -Several scattered 1-3mm bright red macules and thin papules on the trunk and extremities    -Multiple tan light brown medium brown macules papules scattered over the trunk, face and extremities--All with benign-appearing pigment network patterns on dermoscopy    -Several light brown medium brown stuck-on waxy papules scattered on the trunk, extremities and scalp    -7mm pink and brown macule to left upper back, irregular pigment pattern    - 1.2cm pink and brown macule mid back, irregular pigment pattern    - Multiple DF's BLE    - Morbiliform rash,  bilateral upper extremity  flexural areas    - traumatized 2 mm skin-colored to hyperpigmented, soft, pedunculated papule, R anterior flank      IMPRESSION / PLAN:    1. Neoplasm of uncertain behavior  Procedure Note   Procedure: Biopsy by shave technique  Location: L upper back  Size: as noted in exam  Preoperative diagnosis:scar vs other  Risks, benefits and alternatives of procedure discussed, verbal consent obtained for photo (see chart) and written informed consent obtained for procedure. Time out completed. Area of biopsy prepped with alcohol. Anesthesia with 1% lidocaine with epinephrine administered with 30 gauge needle. Shave biopsy of the site performed. Hemostasis achieved with pressure and aluminum chloride. Vaseline applied to wound with bandage. Patient tolerated procedure well and there were no complications. The specimen was sent to the pathology lab by the staff. Wound care was discussed.    Procedure Note   Procedure: Biopsy by shave technique  Location: mid upper back  Size: as noted in exam  Preoperative diagnosis:scar vs other  Risks, benefits and alternatives of procedure discussed, verbal consent obtained for photo (see chart) and written informed consent obtained for procedure. Time out completed. Area of biopsy prepped with alcohol. Anesthesia with 1% lidocaine with epinephrine administered with 30 gauge needle. Shave biopsy of the site performed. Hemostasis achieved with pressure and aluminum chloride. Vaseline applied to wound with bandage. Patient tolerated procedure well and there were no complications. The specimen was sent to the pathology lab by the staff. Wound care was discussed.      2. Cherry angioma  - Benign-appearing nature of lesions discussed during exam.   - Advised to continue to monitor for any return to clinic for new or concerning changes.      3. Lentigines  - Benign-appearing nature of lesions discussed during exam.   - Advised to continue to monitor for any return to clinic for new or  concerning changes.      4. Multiple nevi  - Benign-appearing nature of lesions discussed during exam.   - Advised to continue to monitor for any return to clinic for new or concerning changes.  - ABCDE's of melanoma discussed/handout given      5. SK (seborrheic keratosis)  - Benign-appearing nature of lesions discussed during exam.   - Advised to continue to monitor for any return to clinic for new or concerning changes.      6. Dermatofibroma  - Benign-appearing nature of lesions discussed during exam.   - Advised to continue to monitor for any return to clinic for new or concerning changes.      7. Acrochordon, traumatized  - Benign-appearing nature of lesions discussed during exam.   CRYOTHERAPY:  Risks (including, but not limited to: skin discoloration, redness, blister, blood blister, recurrence, need for further treatment, infection, scar) and benefits of cryotherapy discussed. Patient verbally agreed to proceed with treatment. 1 cryotherapy freeze thaw cycles of 10 seconds were applied to 1 lesion on R flank with cryac. Patient tolerated procedure well. Aftercare instructions given--no specific care needed unless irritated during healing process, can apply Vaseline with small band-aid if needed.    - Advised to continue to monitor for any return to clinic for new or concerning changes.      8. Dermatitis  Likely contact irritant dermatitis, pt reports from [place]  Rx below  - triamcinolone acetonide (KENALOG) 0.1 % Cream; AAA twice a day for 2-3 weeks at a time with 1-2 week break in between  Dispense: 45 g; Refill: 3    9. Skin cancer screening  Skin cancer education  discussed importance of sun protective clothing, eyewear in addition to the use of broad spectrum sunscreen with SPF 30 or greater, as well as need for reapplication ~every 2 hours when exposed to UVR/handout given  discussed importance following up for any new or changing lesions as noted in handout given, but every 12 months  exams in clinic in the setting of dermatologic history  ABCDE's of melanoma discussed/handout given        Discussed risks associated with LN2 and shave bx, Patient verbalized understanding and agrees with plan regarding the above          Please note that this dictation was created using voice recognition software. I have made every reasonable attempt to correct obvious errors, but I expect that there are errors of grammar and possibly content that I did not discover before finalizing the note.      Return to clinic in: Return in about 1 year (around 6/3/2026) for JASON, Pending Bx results. and as needed for any new or changing skin lesions.              [1]   Allergies  Allergen Reactions    Pcn [Penicillins] Rash    Skin Adhesives Rash     rash    Statins [Hmg-Coa-R Inhibitors] Rash     Rash (crestor)

## 2025-06-04 PROCEDURE — RXMED WILLOW AMBULATORY MEDICATION CHARGE: Performed by: STUDENT IN AN ORGANIZED HEALTH CARE EDUCATION/TRAINING PROGRAM

## 2025-06-04 PROCEDURE — RXMED WILLOW AMBULATORY MEDICATION CHARGE: Performed by: NURSE PRACTITIONER

## 2025-06-05 ENCOUNTER — PHARMACY VISIT (OUTPATIENT)
Dept: PHARMACY | Facility: MEDICAL CENTER | Age: 80
End: 2025-06-05
Payer: COMMERCIAL

## 2025-06-09 ENCOUNTER — TELEPHONE (OUTPATIENT)
Dept: DERMATOLOGY | Facility: IMAGING CENTER | Age: 80
End: 2025-06-09
Payer: MEDICARE

## 2025-06-09 NOTE — TELEPHONE ENCOUNTER
Phone Number Called: [phone] (home)       Call outcome: Spoke to patient regarding message below.    Message: Spoke with patient regarding bx results, both are scars and no further treatment is needed. Patient understood.

## 2025-06-23 PROCEDURE — RXMED WILLOW AMBULATORY MEDICATION CHARGE: Performed by: NURSE PRACTITIONER

## 2025-06-24 ENCOUNTER — PHARMACY VISIT (OUTPATIENT)
Dept: PHARMACY | Facility: MEDICAL CENTER | Age: 80
End: 2025-06-24
Payer: COMMERCIAL

## 2025-08-22 DIAGNOSIS — E78.5 HYPERLIPIDEMIA, UNSPECIFIED HYPERLIPIDEMIA TYPE: ICD-10-CM

## 2025-08-22 DIAGNOSIS — I70.0 AORTIC ATHEROSCLEROSIS (HCC): ICD-10-CM

## 2025-08-25 PROCEDURE — RXMED WILLOW AMBULATORY MEDICATION CHARGE: Performed by: STUDENT IN AN ORGANIZED HEALTH CARE EDUCATION/TRAINING PROGRAM

## 2025-08-25 RX ORDER — EZETIMIBE 10 MG/1
10 TABLET ORAL DAILY
Qty: 100 TABLET | Refills: 3 | Status: SHIPPED | OUTPATIENT
Start: 2025-08-25

## 2025-08-26 ENCOUNTER — PHARMACY VISIT (OUTPATIENT)
Dept: PHARMACY | Facility: MEDICAL CENTER | Age: 80
End: 2025-08-26
Payer: COMMERCIAL

## (undated) DEVICE — GOWN SURGEONS LARGE - (32/CA)

## (undated) DEVICE — SUCTION INSTRUMENT YANKAUER BULBOUS TIP W/O VENT (50EA/CA)

## (undated) DEVICE — GOWN WARMING STANDARD FLEX - (30/CA)

## (undated) DEVICE — SUTURE 3-0 VICRYL PLUS RB-1 - 8 X 18 INCH (12/BX)

## (undated) DEVICE — SUTURE 5-0 MONOCRYL PLUS PC-3 - (12/BX)

## (undated) DEVICE — SET LEADWIRE 5 LEAD BEDSIDE DISPOSABLE ECG (1SET OF 5/EA)

## (undated) DEVICE — TOWEL STOP TIMEOUT SAFETY FLAG (40EA/CA)

## (undated) DEVICE — SLEEVE, VASO, THIGH, MED

## (undated) DEVICE — CANISTER SUCTION RIGID RED 1500CC (40EA/CA)

## (undated) DEVICE — ELECTRODE 850 FOAM ADHESIVE - HYDROGEL RADIOTRNSPRNT (50/PK)

## (undated) DEVICE — SUTURE 3-0 VICRYL PLUS SH - 27 INCH (36/BX)

## (undated) DEVICE — LACTATED RINGERS INJ 1000 ML - (14EA/CA 60CA/PF)

## (undated) DEVICE — KIT ANESTHESIA W/CIRCUIT & 3/LT BAG W/FILTER (20EA/CA)

## (undated) DEVICE — DRAPE LG. APERTURE 33 X 51" - (10EA/BX)"

## (undated) DEVICE — SODIUM CHL IRRIGATION 0.9% 1000ML (12EA/CA)

## (undated) DEVICE — PACK MINOR BASIN - (2EA/CA)

## (undated) DEVICE — SHEET THYROID - (10EA/CA)

## (undated) DEVICE — CATHETER IV 20 GA X 1-1/4 ---SURG.& SDS ONLY--- (50EA/BX)

## (undated) DEVICE — HEAD HOLDER JUNIOR/ADULT

## (undated) DEVICE — GLOVE BIOGEL INDICATOR SZ 6.5 SURGICAL PF LTX - (50PR/BX 4BX/CA)

## (undated) DEVICE — AGENT HEMOSTATIC BELLOW HEMOBLAST (12EA/CA)

## (undated) DEVICE — TUBE CONNECTING SUCTION - CLEAR PLASTIC STERILE 72 IN (50EA/CA)

## (undated) DEVICE — GLOVE BIOGEL SZ 8 SURGICAL PF LTX - (50PR/BX 4BX/CA)

## (undated) DEVICE — TUBING CLEARLINK DUO-VENT - C-FLO (48EA/CA)

## (undated) DEVICE — SUTURE 3-0 VICRYL PLUS SH - 8X 18 INCH (12/BX)

## (undated) DEVICE — CANISTER SUCTION 3000ML MECHANICAL FILTER AUTO SHUTOFF MEDI-VAC NONSTERILE LF DISP  (40EA/CA)

## (undated) DEVICE — DRESSING TRANSPARENT FILM TEGADERM 4 X 4.75" (50EA/BX)"

## (undated) DEVICE — PROTECTOR ULNA NERVE - (36PR/CA)

## (undated) DEVICE — CHLORAPREP 26 ML APPLICATOR - ORANGE TINT(25/CA)

## (undated) DEVICE — MASK ANESTHESIA ADULT  - (100/CA)

## (undated) DEVICE — TUBE EMG NIM TRIVANTAGE 7MM (3EA/PK)

## (undated) DEVICE — PROBE PRASS STAND STIMULATING (5EA/PK)

## (undated) DEVICE — KIT  I.V. START (100EA/CA)

## (undated) DEVICE — CLIP MED INTNL HRZN TI ESCP - (25/BX)

## (undated) DEVICE — SPONGE PEANUT - (5/PK 50PK/CA)

## (undated) DEVICE — WATER IRRIGATION STERILE 1000ML (12EA/CA)

## (undated) DEVICE — CLIP SM INTNL HRZN TI ESCP LGT - (24EA/PK 25PK/BX)

## (undated) DEVICE — DRAPE MAGNETIC (INSTRA-MAG) - (30/CA)

## (undated) DEVICE — SUTURE GENERAL

## (undated) DEVICE — SPONGE GAUZESTER 4 X 4 4PLY - (128PK/CA)

## (undated) DEVICE — SENSOR SPO2 NEO LNCS ADHESIVE (20/BX) SEE USER NOTES

## (undated) DEVICE — BOVIE NEEDLE TIP 3CM COLORADO

## (undated) DEVICE — ELECTRODE DUAL RETURN W/ CORD - (50/PK)

## (undated) DEVICE — SHEAR HS FOCUS 9CM CVD - (6/BX)